# Patient Record
Sex: FEMALE | Race: WHITE | NOT HISPANIC OR LATINO
[De-identification: names, ages, dates, MRNs, and addresses within clinical notes are randomized per-mention and may not be internally consistent; named-entity substitution may affect disease eponyms.]

---

## 2017-02-05 ENCOUNTER — FORM ENCOUNTER (OUTPATIENT)
Age: 57
End: 2017-02-05

## 2017-02-06 ENCOUNTER — OUTPATIENT (OUTPATIENT)
Dept: OUTPATIENT SERVICES | Facility: HOSPITAL | Age: 57
LOS: 1 days | End: 2017-02-06
Payer: COMMERCIAL

## 2017-02-06 ENCOUNTER — APPOINTMENT (OUTPATIENT)
Dept: ULTRASOUND IMAGING | Facility: IMAGING CENTER | Age: 57
End: 2017-02-06

## 2017-02-06 DIAGNOSIS — N95.2 POSTMENOPAUSAL ATROPHIC VAGINITIS: ICD-10-CM

## 2017-02-06 PROCEDURE — 76641 ULTRASOUND BREAST COMPLETE: CPT | Mod: 26,50

## 2017-02-06 PROCEDURE — 76830 TRANSVAGINAL US NON-OB: CPT | Mod: 26

## 2017-02-06 PROCEDURE — 76830 TRANSVAGINAL US NON-OB: CPT

## 2017-02-06 PROCEDURE — 76641 ULTRASOUND BREAST COMPLETE: CPT

## 2017-02-06 PROCEDURE — 76856 US EXAM PELVIC COMPLETE: CPT | Mod: 26

## 2017-02-06 PROCEDURE — 76856 US EXAM PELVIC COMPLETE: CPT

## 2017-02-10 DIAGNOSIS — N83.292 OTHER OVARIAN CYST, LEFT SIDE: ICD-10-CM

## 2017-02-10 DIAGNOSIS — N64.89 OTHER SPECIFIED DISORDERS OF BREAST: ICD-10-CM

## 2017-02-10 DIAGNOSIS — N60.11 DIFFUSE CYSTIC MASTOPATHY OF RIGHT BREAST: ICD-10-CM

## 2017-04-06 ENCOUNTER — RX RENEWAL (OUTPATIENT)
Age: 57
End: 2017-04-06

## 2017-07-05 ENCOUNTER — RX RENEWAL (OUTPATIENT)
Age: 57
End: 2017-07-05

## 2017-07-30 ENCOUNTER — RX RENEWAL (OUTPATIENT)
Age: 57
End: 2017-07-30

## 2017-08-24 ENCOUNTER — APPOINTMENT (OUTPATIENT)
Dept: PULMONOLOGY | Facility: CLINIC | Age: 57
End: 2017-08-24
Payer: COMMERCIAL

## 2017-08-24 VITALS
HEIGHT: 62 IN | DIASTOLIC BLOOD PRESSURE: 65 MMHG | OXYGEN SATURATION: 99 % | BODY MASS INDEX: 23.92 KG/M2 | HEART RATE: 81 BPM | RESPIRATION RATE: 17 BRPM | WEIGHT: 130 LBS | SYSTOLIC BLOOD PRESSURE: 120 MMHG

## 2017-08-24 DIAGNOSIS — R07.89 OTHER CHEST PAIN: ICD-10-CM

## 2017-08-24 PROCEDURE — 99214 OFFICE O/P EST MOD 30 MIN: CPT | Mod: 25

## 2017-08-24 PROCEDURE — 95012 NITRIC OXIDE EXP GAS DETER: CPT

## 2017-08-24 PROCEDURE — 94010 BREATHING CAPACITY TEST: CPT

## 2017-08-24 RX ORDER — CLONAZEPAM 0.5 MG/1
0.5 TABLET ORAL
Qty: 60 | Refills: 0 | Status: ACTIVE | COMMUNITY
Start: 2017-05-15

## 2017-08-24 RX ORDER — METAXALONE 800 MG/1
800 TABLET ORAL
Qty: 60 | Refills: 0 | Status: ACTIVE | COMMUNITY
Start: 2017-03-10

## 2017-10-22 ENCOUNTER — TRANSCRIPTION ENCOUNTER (OUTPATIENT)
Age: 57
End: 2017-10-22

## 2017-12-13 ENCOUNTER — APPOINTMENT (OUTPATIENT)
Dept: PULMONOLOGY | Facility: CLINIC | Age: 57
End: 2017-12-13
Payer: COMMERCIAL

## 2017-12-13 VITALS
HEART RATE: 96 BPM | BODY MASS INDEX: 24.29 KG/M2 | SYSTOLIC BLOOD PRESSURE: 110 MMHG | RESPIRATION RATE: 15 BRPM | DIASTOLIC BLOOD PRESSURE: 70 MMHG | OXYGEN SATURATION: 92 % | HEIGHT: 62 IN | WEIGHT: 132 LBS

## 2017-12-13 PROCEDURE — 71020: CPT

## 2017-12-13 PROCEDURE — 99214 OFFICE O/P EST MOD 30 MIN: CPT | Mod: 25

## 2017-12-13 RX ORDER — BACITRACIN 500 [USP'U]/G
500 OINTMENT OPHTHALMIC
Qty: 3 | Refills: 0 | Status: ACTIVE | COMMUNITY
Start: 2017-11-27

## 2017-12-13 RX ORDER — DULOXETINE HYDROCHLORIDE 30 MG/1
30 CAPSULE, DELAYED RELEASE PELLETS ORAL
Qty: 30 | Refills: 0 | Status: ACTIVE | COMMUNITY
Start: 2017-09-21

## 2017-12-13 RX ORDER — ACETAMINOPHEN AND CODEINE 300; 30 MG/1; MG/1
300-30 TABLET ORAL
Qty: 8 | Refills: 0 | Status: ACTIVE | COMMUNITY
Start: 2017-12-02

## 2017-12-24 ENCOUNTER — RX RENEWAL (OUTPATIENT)
Age: 57
End: 2017-12-24

## 2018-03-14 ENCOUNTER — MEDICATION RENEWAL (OUTPATIENT)
Age: 58
End: 2018-03-14

## 2018-03-23 ENCOUNTER — MEDICATION RENEWAL (OUTPATIENT)
Age: 58
End: 2018-03-23

## 2018-04-03 ENCOUNTER — APPOINTMENT (OUTPATIENT)
Dept: PULMONOLOGY | Facility: CLINIC | Age: 58
End: 2018-04-03

## 2018-05-11 ENCOUNTER — MEDICATION RENEWAL (OUTPATIENT)
Age: 58
End: 2018-05-11

## 2018-07-09 ENCOUNTER — MEDICATION RENEWAL (OUTPATIENT)
Age: 58
End: 2018-07-09

## 2018-07-17 ENCOUNTER — NON-APPOINTMENT (OUTPATIENT)
Age: 58
End: 2018-07-17

## 2018-07-17 ENCOUNTER — APPOINTMENT (OUTPATIENT)
Dept: PULMONOLOGY | Facility: CLINIC | Age: 58
End: 2018-07-17
Payer: COMMERCIAL

## 2018-07-17 VITALS
WEIGHT: 136 LBS | BODY MASS INDEX: 25.03 KG/M2 | HEIGHT: 62 IN | DIASTOLIC BLOOD PRESSURE: 70 MMHG | HEART RATE: 74 BPM | OXYGEN SATURATION: 96 % | SYSTOLIC BLOOD PRESSURE: 120 MMHG

## 2018-07-17 DIAGNOSIS — J20.9 ACUTE BRONCHITIS, UNSPECIFIED: ICD-10-CM

## 2018-07-17 DIAGNOSIS — J45.901 ACUTE BRONCHITIS, UNSPECIFIED: ICD-10-CM

## 2018-07-17 PROCEDURE — 94010 BREATHING CAPACITY TEST: CPT

## 2018-07-17 PROCEDURE — 99214 OFFICE O/P EST MOD 30 MIN: CPT | Mod: 25

## 2018-12-14 ENCOUNTER — RX RENEWAL (OUTPATIENT)
Age: 58
End: 2018-12-14

## 2018-12-19 ENCOUNTER — RX RENEWAL (OUTPATIENT)
Age: 58
End: 2018-12-19

## 2019-01-17 ENCOUNTER — NON-APPOINTMENT (OUTPATIENT)
Age: 59
End: 2019-01-17

## 2019-01-17 ENCOUNTER — APPOINTMENT (OUTPATIENT)
Dept: PULMONOLOGY | Facility: CLINIC | Age: 59
End: 2019-01-17
Payer: COMMERCIAL

## 2019-01-17 VITALS
HEART RATE: 72 BPM | SYSTOLIC BLOOD PRESSURE: 120 MMHG | WEIGHT: 140 LBS | OXYGEN SATURATION: 98 % | DIASTOLIC BLOOD PRESSURE: 60 MMHG | HEIGHT: 62 IN | BODY MASS INDEX: 25.76 KG/M2 | RESPIRATION RATE: 17 BRPM | TEMPERATURE: 98.5 F

## 2019-01-17 PROCEDURE — 94010 BREATHING CAPACITY TEST: CPT

## 2019-01-17 PROCEDURE — 99214 OFFICE O/P EST MOD 30 MIN: CPT | Mod: 25

## 2019-01-17 NOTE — HISTORY OF PRESENT ILLNESS
[FreeTextEntry1] : Ms. Dickerson is a 58 year old female with a history of allergic rhinitis, asthma, atypical chest pain, chest tightness/pressure, GERD, CRYSTAL, and SOB presenting to the office today for a follow up visit. Her chief complaint is recent URI\par -she states that she has recently been getting over a cold, and has not been feeling much better\par -she recently just had her second ablation on her neck \par -she notes that she has been having significant knee pains\par -she reports that possibly due to anxiety, she will have some chest discomfort and palpitations\par -she notes that she occasionally wakes up choking, and is waiting to be finished with Invisalign before getting her mouthpiece\par -she notes that her sinuses were very active last week when she was feeling sick \par -she reports that she was getting frequent sinus headaches\par -she notes that she has been doing yoga for exercise, however she has been having some orthopedic limitations\par -she states that her breathing has improved, and she generally feels better this week \par \par -she denies any headaches, nausea, vomiting, fever, chills, sweats, chest pain, chest pressure, diarrhea, constipation, dysphagia, dizziness, leg swelling, leg pain, itchy eyes, itchy ears, heartburn, reflux, or sour taste in the mouth.

## 2019-01-17 NOTE — ASSESSMENT
[FreeTextEntry1] : Ms. Dickerson is a 57 year old female with asthma, allergies, GERD, OSAS and mild SOB (non-compliant) - She is s/p URI, but is now controlled. \par \par problem 1: asthmatic bronchitis \par -continue to use Levalbuterol 1.25% by the nebulizer \par -continue to use Accolate 20 mg BID \par -continue Trelegy 1 puff QD  \par -continue to use Ventolin PRN \par \par -Asthma is  believed to be caused by inherited (genetic) and environmental factor, but its exact cause is unknown. Asthma may be triggered by allergens, lung infections, or irritants in the air. Asthma triggers are different for each person\par -Inhaler technique reviewed as well as oral hygiene techniques reviewed with patient. Avoidance of cold air, extremes of temperature, rescue inhaler should be used before exercise. Order of medication reviewed with patient. Recommended use of a cool mist humidifier in the bedroom.\par \par problem 3: allergic rhinitis\par -continue to use nasal saline/Xlear; Navage nasal spray\par -can add OTC antihistamine PRN \par -continue to use Astelin (.15) 1 sniff/nostril BID \par \par -Environmental measures for allergies were encouraged including mattress and pillow cover, air purifier, and environmental controls. \par \par problem 4: CRYSTAL\par -recommended trial of the ProVent nasal strips \par -recommended positional sleep \par \par -Sleep apnea is associated with adverse clinical consequences which an affect most organ systems.  Cardiovascular disease risk includes arrhythmias, atrial fibrillation, hypertension, coronary artery disease, and stroke. Metabolic disorders include diabetes type 2, non-alcoholic fatty liver disease. Mood disorder especially depression; and cognitive decline especially in the elderly. Associations with  chronic reflux/Brothers’s esophagus some but not all inclusive. \par -Reasons to assess this include arousal consistent with hypopnea; respiratory events most prominent in REM sleep or supine position; therefore sleep staging and body position are important for accurate diagnosis and estimation of AHI.\par -Treatment options discussed including CPAP/BiPAP machine, oral appliance, ProVent therapy, Oxy-Aid by Respitec, new technologies, or positional sleep.Recommended use of the CPAP machine for moderate (AHI >15), moderate to severe (AHI 15-30) and severe patients (AHI > 30). Recommended weight loss which can reduce AHI especially in weight loss of greater than 5% of BMI. Positional sleep is recommended in those with low AHI, low-moderate BMI, and younger age. For severe sleep apnea, the hypoglossal nerve stimulator was recommended as well.\par \par problem 5: GERD\par -continue AcipHex 20 mg before breakfast\par \par -Rule of 2s: avoid eating too much, eating too late, eating too spicy, eating two hours before bed\par -Things to avoid including overeating, spicy foods, tight clothing, eating within three hours of bed, this list is not all inclusive. \par -For treatment of reflux, possible options discussed including diet control, H2 blockers, PPIs, as well as coating motility agents discussed as treatment options. Timing of meals and proximity of last meal to sleep were discussed. If symptoms persist, a formal gastrointestinal evaluation is needed.\par \par problem 6: atypical chest pain\par -felt to be related to reflux and anxiety \par \par problem 7: health maintenance \par -recommended yearly flu shot after October 15 - 2018\par -recommended strep pneumonia vaccines: Prevnar-13 vaccine, followed by Pneumo vaccine 23 one year following\par -recommended early intervention for URIs\par -recommended regular osteoporosis evaluations\par -recommended early dermatological evaluations\par -recommended after the age of 50 to the age of 70, colonoscopy every 5 years \par \par F/U in 6 months SPI and NIOX.\par She is encouraged to call with any changes, concerns, or questions.

## 2019-01-17 NOTE — PROCEDURE
[FreeTextEntry1] : PFT - spi reveals normal flows; FEV1 is 2.22 which is 94% of predicted, normal flow volume loop

## 2019-01-17 NOTE — ADDENDUM
[FreeTextEntry1] : All medical record entries made by fredi Nino were at Dr. Mathew Rao's, direction and personally dictated by me on 01/17/2019. I have reviewed the chart and agree that the record accurately reflects my personal performance of the history, physical exam, assessment and plan. I have also personally directed, reviewed, and agree with the discharge instructions.

## 2019-01-17 NOTE — REASON FOR VISIT
[Follow-Up] : a follow-up visit [FreeTextEntry1] : allergic rhinitis, asthma, atypical chest pain, chest tightness/pressure, GERD, CRYSTAL, and SOB

## 2019-03-05 ENCOUNTER — RESULT REVIEW (OUTPATIENT)
Age: 59
End: 2019-03-05

## 2019-05-10 ENCOUNTER — APPOINTMENT (OUTPATIENT)
Dept: RADIOLOGY | Facility: IMAGING CENTER | Age: 59
End: 2019-05-10
Payer: COMMERCIAL

## 2019-05-10 ENCOUNTER — APPOINTMENT (OUTPATIENT)
Dept: ULTRASOUND IMAGING | Facility: IMAGING CENTER | Age: 59
End: 2019-05-10
Payer: COMMERCIAL

## 2019-05-10 ENCOUNTER — APPOINTMENT (OUTPATIENT)
Dept: MAMMOGRAPHY | Facility: IMAGING CENTER | Age: 59
End: 2019-05-10
Payer: COMMERCIAL

## 2019-05-10 ENCOUNTER — OUTPATIENT (OUTPATIENT)
Dept: OUTPATIENT SERVICES | Facility: HOSPITAL | Age: 59
LOS: 1 days | End: 2019-05-10
Payer: COMMERCIAL

## 2019-05-10 DIAGNOSIS — Z00.8 ENCOUNTER FOR OTHER GENERAL EXAMINATION: ICD-10-CM

## 2019-05-10 PROCEDURE — 77080 DXA BONE DENSITY AXIAL: CPT | Mod: 26

## 2019-05-10 PROCEDURE — 77080 DXA BONE DENSITY AXIAL: CPT

## 2019-05-10 PROCEDURE — 77063 BREAST TOMOSYNTHESIS BI: CPT | Mod: 26

## 2019-05-10 PROCEDURE — 76641 ULTRASOUND BREAST COMPLETE: CPT | Mod: 26,50

## 2019-05-10 PROCEDURE — 77067 SCR MAMMO BI INCL CAD: CPT | Mod: 26

## 2019-05-10 PROCEDURE — 76641 ULTRASOUND BREAST COMPLETE: CPT

## 2019-05-10 PROCEDURE — 77063 BREAST TOMOSYNTHESIS BI: CPT

## 2019-05-10 PROCEDURE — 77067 SCR MAMMO BI INCL CAD: CPT

## 2019-06-22 ENCOUNTER — RX RENEWAL (OUTPATIENT)
Age: 59
End: 2019-06-22

## 2019-06-22 ENCOUNTER — TRANSCRIPTION ENCOUNTER (OUTPATIENT)
Age: 59
End: 2019-06-22

## 2019-07-11 ENCOUNTER — RX RENEWAL (OUTPATIENT)
Age: 59
End: 2019-07-11

## 2019-07-17 ENCOUNTER — NON-APPOINTMENT (OUTPATIENT)
Age: 59
End: 2019-07-17

## 2019-07-17 ENCOUNTER — APPOINTMENT (OUTPATIENT)
Dept: PULMONOLOGY | Facility: CLINIC | Age: 59
End: 2019-07-17
Payer: COMMERCIAL

## 2019-07-17 VITALS
OXYGEN SATURATION: 98 % | WEIGHT: 136 LBS | HEART RATE: 90 BPM | DIASTOLIC BLOOD PRESSURE: 65 MMHG | BODY MASS INDEX: 25.03 KG/M2 | HEIGHT: 62 IN | SYSTOLIC BLOOD PRESSURE: 120 MMHG | RESPIRATION RATE: 16 BRPM

## 2019-07-17 PROCEDURE — 94010 BREATHING CAPACITY TEST: CPT

## 2019-07-17 PROCEDURE — 99214 OFFICE O/P EST MOD 30 MIN: CPT | Mod: 25

## 2019-07-17 RX ORDER — AMOXICILLIN AND CLAVULANATE POTASSIUM 875; 125 MG/1; MG/1
875-125 TABLET, COATED ORAL
Qty: 28 | Refills: 0 | Status: DISCONTINUED | COMMUNITY
Start: 2017-12-11 | End: 2019-07-17

## 2019-07-17 NOTE — ASSESSMENT
[FreeTextEntry1] : Ms. Dickersno is a 57 year old female with asthma, allergies, GERD, OSAS and mild SOB (non-compliant) presenting to the office today for a follow up pulmonary evaluation for stress, orthopedic issues, sinus issues, and GI symptoms. Her number one issue is stress.\par \par problem 1: asthma\par -continue to use Levalbuterol 1.25% by the nebulizer \par -continue to use Accolate 20 mg BID \par -continue Trelegy 1 puff QD  \par -continue to use Ventolin PRN \par \par -Asthma is  believed to be caused by inherited (genetic) and environmental factor, but its exact cause is unknown. Asthma may be triggered by allergens, lung infections, or irritants in the air. Asthma triggers are different for each person\par -Inhaler technique reviewed as well as oral hygiene techniques reviewed with patient. Avoidance of cold air, extremes of temperature, rescue inhaler should be used before exercise. Order of medication reviewed with patient. Recommended use of a cool mist humidifier in the bedroom.\par \par problem 3: allergic rhinitis - (active)\par -continue to use nasal saline/Xlear; Navage nasal spray\par -can add OTC antihistamine PRN \par -continue to use Astelin (.15) 1 sniff/nostril BID \par \par -Environmental measures for allergies were encouraged including mattress and pillow cover, air purifier, and environmental controls. \par \par problem 4: CRYSTAL\par -recommended trial of the ProVent nasal strips \par -recommended positional sleep \par -recommended to use "Chin Strap" \par -Sleep apnea is associated with adverse clinical consequences which an affect most organ systems.  Cardiovascular disease risk includes arrhythmias, atrial fibrillation, hypertension, coronary artery disease, and stroke. Metabolic disorders include diabetes type 2, non-alcoholic fatty liver disease. Mood disorder especially depression; and cognitive decline especially in the elderly. Associations with  chronic reflux/Brothers’s esophagus some but not all inclusive. \par -Reasons to assess this include arousal consistent with hypopnea; respiratory events most prominent in REM sleep or supine position; therefore sleep staging and body position are important for accurate diagnosis and estimation of AHI.\par -Treatment options discussed including CPAP/BiPAP machine, oral appliance, ProVent therapy, Oxy-Aid by Respitec, new technologies, or positional sleep.Recommended use of the CPAP machine for moderate (AHI >15), moderate to severe (AHI 15-30) and severe patients (AHI > 30). Recommended weight loss which can reduce AHI especially in weight loss of greater than 5% of BMI. Positional sleep is recommended in those with low AHI, low-moderate BMI, and younger age. For severe sleep apnea, the hypoglossal nerve stimulator was recommended as well.\par \par problem 5: GERD\par -continue AcipHex 20 mg before breakfast\par \par -Rule of 2s: avoid eating too much, eating too late, eating too spicy, eating two hours before bed\par -Things to avoid including overeating, spicy foods, tight clothing, eating within three hours of bed, this list is not all inclusive. \par -For treatment of reflux, possible options discussed including diet control, H2 blockers, PPIs, as well as coating motility agents discussed as treatment options. Timing of meals and proximity of last meal to sleep were discussed. If symptoms persist, a formal gastrointestinal evaluation is needed.\par \par problem 6: atypical chest pain\par -felt to be related to reflux and anxiety \par \par problem 7: anxiety \par -recommended to read: "The Gift of Maybe," by Rebeca Aguilar \par \par problem 8: health maintenance \par -recommended yearly flu shot after October 15 - 2018\par -recommended strep pneumonia vaccines: Prevnar-13 vaccine, followed by Pneumo vaccine 23 one year following\par -recommended early intervention for URIs\par -recommended regular osteoporosis evaluations\par -recommended early dermatological evaluations\par -recommended after the age of 50 to the age of 70, colonoscopy every 5 years \par \par F/U in 6 months SPI and NIOX.\par She is encouraged to call with any changes, concerns, or questions.

## 2019-07-17 NOTE — ADDENDUM
[FreeTextEntry1] : All medical record entries made by fredi Nino were at Dr. Mathew Rao's, direction and personally dictated by me on 07/17/2019. I have reviewed the chart and agree that the record accurately reflects my personal performance of the history, physical exam, assessment and plan. I have also personally directed, reviewed, and agree with the discharge instructions.

## 2019-07-17 NOTE — PROCEDURE
[FreeTextEntry1] : PFT - spi reveals normal flows; FEV1 is 2.25 which is 95% of predicted, normal flow volume loop \par \par *Refused FeNO test due to insurance coverage*

## 2019-07-17 NOTE — REASON FOR VISIT
[Follow-Up] : a follow-up visit [FreeTextEntry1] : allergic rhinitis, asthma, atypical chest pain, GERD, CRYSTAL, and SOB

## 2019-07-17 NOTE — HISTORY OF PRESENT ILLNESS
[FreeTextEntry1] : Ms. Dickerson is a 58 year old female with a history of allergic rhinitis, asthma, atypical chest pain, GERD, CRYSTAL, and SOB presenting to the office today for a follow up visit. His chief complaint is stress / orthopedic issues. 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 she feels significant chest tightness when she begins to feel panicked \par -she has been using NeoMed spray; she has not gotten a Navage\par -she denies any headaches, nausea, vomiting, fever, chills, sweats, chest pain, diarrhea, constipation, dysphagia, dizziness, leg swelling, leg pain, itchy eyes, itchy ears, heartburn, reflux, or sour taste in the mouth.

## 2019-09-24 ENCOUNTER — MEDICATION RENEWAL (OUTPATIENT)
Age: 59
End: 2019-09-24

## 2019-10-03 ENCOUNTER — RX RENEWAL (OUTPATIENT)
Age: 59
End: 2019-10-03

## 2020-01-05 ENCOUNTER — RX RENEWAL (OUTPATIENT)
Age: 60
End: 2020-01-05

## 2020-01-21 ENCOUNTER — NON-APPOINTMENT (OUTPATIENT)
Age: 60
End: 2020-01-21

## 2020-01-21 ENCOUNTER — APPOINTMENT (OUTPATIENT)
Dept: PULMONOLOGY | Facility: CLINIC | Age: 60
End: 2020-01-21
Payer: COMMERCIAL

## 2020-01-21 VITALS
RESPIRATION RATE: 17 BRPM | HEART RATE: 94 BPM | SYSTOLIC BLOOD PRESSURE: 120 MMHG | WEIGHT: 140 LBS | DIASTOLIC BLOOD PRESSURE: 70 MMHG | OXYGEN SATURATION: 98 % | BODY MASS INDEX: 25.76 KG/M2 | TEMPERATURE: 99.4 F | HEIGHT: 62 IN

## 2020-01-21 DIAGNOSIS — M79.7 FIBROMYALGIA: ICD-10-CM

## 2020-01-21 PROCEDURE — 94010 BREATHING CAPACITY TEST: CPT

## 2020-01-21 PROCEDURE — 99214 OFFICE O/P EST MOD 30 MIN: CPT | Mod: 25

## 2020-01-21 RX ORDER — AMOXICILLIN AND CLAVULANATE POTASSIUM 875; 125 MG/1; MG/1
875-125 TABLET, COATED ORAL
Qty: 28 | Refills: 0 | Status: DISCONTINUED | COMMUNITY
Start: 2019-09-24 | End: 2020-01-21

## 2020-01-21 RX ORDER — CEFUROXIME AXETIL 500 MG/1
500 TABLET ORAL
Qty: 28 | Refills: 0 | Status: DISCONTINUED | COMMUNITY
Start: 2020-01-09 | End: 2020-01-21

## 2020-01-21 RX ORDER — PREDNISONE 10 MG/1
10 TABLET ORAL
Qty: 30 | Refills: 0 | Status: DISCONTINUED | COMMUNITY
Start: 2017-12-13 | End: 2020-01-21

## 2020-01-21 NOTE — ASSESSMENT
[FreeTextEntry1] : Ms. Dickerson is a 59 year old female with asthma, allergies, GERD, OSAS and mild SOB (non-compliant) presenting to the office today for a follow up pulmonary evaluation for stress, orthopedic issues, sinus issues, and GI symptoms. Her number one issue is \par \par problem 1: asthma\par -continue to use Levalbuterol 1.25% by the nebulizer \par -continue to use Accolate 20 mg BID \par -continue Trelegy 1 puff QD  \par -continue to use Ventolin PRN \par \par -Asthma is  believed to be caused by inherited (genetic) and environmental factor, but its exact cause is unknown. Asthma may be triggered by allergens, lung infections, or irritants in the air. Asthma triggers are different for each person\par -Inhaler technique reviewed as well as oral hygiene techniques reviewed with patient. Avoidance of cold air, extremes of temperature, rescue inhaler should be used before exercise. Order of medication reviewed with patient. Recommended use of a cool mist humidifier in the bedroom.\par \par problem 2: allergic rhinitis - (active)\par -continue to use nasal saline/Xlear; Navage nasal spray\par -can add OTC antihistamine PRN \par -continue to use Astelin (.15) 1 sniff/nostril BID \par \par -Environmental measures for allergies were encouraged including mattress and pillow cover, air purifier, and environmental controls. \par \par problem 3: acute/chronic sinusitis\par -recommended CT of sinuses w/o contrast\par -add Bactrim DS 1 tablet BID\par \par problem 4: CRYSTAL\par -recommended trial of the ProVent nasal strips \par -recommended positional sleep \par -recommended to use "Chin Strap" \par -Sleep apnea is associated with adverse clinical consequences which an affect most organ systems.  Cardiovascular disease risk includes arrhythmias, atrial fibrillation, hypertension, coronary artery disease, and stroke. Metabolic disorders include diabetes type 2, non-alcoholic fatty liver disease. Mood disorder especially depression; and cognitive decline especially in the elderly. Associations with  chronic reflux/Brothers’s esophagus some but not all inclusive. \par -Reasons to assess this include arousal consistent with hypopnea; respiratory events most prominent in REM sleep or supine position; therefore sleep staging and body position are important for accurate diagnosis and estimation of AHI.\par -Treatment options discussed including CPAP/BiPAP machine, oral appliance, ProVent therapy, Oxy-Aid by Respitec, new technologies, or positional sleep.Recommended use of the CPAP machine for moderate (AHI >15), moderate to severe (AHI 15-30) and severe patients (AHI > 30). Recommended weight loss which can reduce AHI especially in weight loss of greater than 5% of BMI. Positional sleep is recommended in those with low AHI, low-moderate BMI, and younger age. For severe sleep apnea, the hypoglossal nerve stimulator was recommended as well.\par \par problem 5: GERD\par -continue AcipHex 20 mg before breakfast\par \par -Rule of 2s: avoid eating too much, eating too late, eating too spicy, eating two hours before bed\par -Things to avoid including overeating, spicy foods, tight clothing, eating within three hours of bed, this list is not all inclusive. \par -For treatment of reflux, possible options discussed including diet control, H2 blockers, PPIs, as well as coating motility agents discussed as treatment options. Timing of meals and proximity of last meal to sleep were discussed. If symptoms persist, a formal gastrointestinal evaluation is needed.\par \par problem 6: atypical chest pain\par -felt to be related to reflux and anxiety \par \par problem 7: anxiety \par -recommended to read: "The Gift of Maybe," by Rebeca Aguilar \par \par problem 8: overweight\par -recommended to try HMR at [place]\par Weight loss, exercise, and diet control were discussed and are highly encouraged. Treatment options were given such as, aqua therapy, and contacting a nutritionist. Recommended to use the elliptical, stationary bike, less use of treadmill. Mindful eating was explained to the patient Obesity is associated with worsening asthma, shortness of breath, and potential for cardiac disease, diabetes, and other underlying medical conditions. \par \par problem 9: health maintenance \par -recommended yearly flu shot after October 15 - 2018\par -recommended strep pneumonia vaccines: Prevnar-13 vaccine, followed by Pneumo vaccine 23 one year following\par -recommended early intervention for URIs\par -recommended regular osteoporosis evaluations\par -recommended early dermatological evaluations\par -recommended after the age of 50 to the age of 70, colonoscopy every 5 years \par \par F/U in 6 months SPI and NIOX.\par She is encouraged to call with any changes, concerns, or questions.

## 2020-01-21 NOTE — PHYSICAL EXAM
[General Appearance - Well Developed] : well developed [General Appearance - Well Nourished] : well nourished [Well Groomed] : well groomed [Normal Appearance] : normal appearance [Normal Conjunctiva] : the conjunctiva exhibited no abnormalities [Eyelids - No Xanthelasma] : the eyelids demonstrated no xanthelasmas [General Appearance - In No Acute Distress] : no acute distress [No Deformities] : no deformities [II] : II [Normal Oropharynx] : normal oropharynx [Neck Appearance] : the appearance of the neck was normal [Thyroid Nodule] : there were no palpable thyroid nodules [Jugular Venous Distention Increased] : there was no jugular-venous distention [Neck Cervical Mass (___cm)] : no neck mass was observed [Thyroid Diffuse Enlargement] : the thyroid was not enlarged [Heart Sounds] : normal S1 and S2 [Heart Rate And Rhythm] : heart rate and rhythm were normal [Murmurs] : no murmurs present [Respiration, Rhythm And Depth] : normal respiratory rhythm and effort [Exaggerated Use Of Accessory Muscles For Inspiration] : no accessory muscle use [Abdomen Soft] : soft [Auscultation Breath Sounds / Voice Sounds] : lungs were clear to auscultation bilaterally [Abnormal Walk] : normal gait [Abdomen Tenderness] : non-tender [Abdomen Mass (___ Cm)] : no abdominal mass palpated [Gait - Sufficient For Exercise Testing] : the gait was sufficient for exercise testing [Cyanosis, Localized] : no localized cyanosis [Nail Clubbing] : no clubbing of the fingernails [Petechial Hemorrhages (___cm)] : no petechial hemorrhages [Skin Color & Pigmentation] : normal skin color and pigmentation [No Skin Ulcers] : no skin ulcer [] : no rash [No Venous Stasis] : no venous stasis [Skin Lesions] : no skin lesions [No Xanthoma] : no  xanthoma was observed [Sensation] : the sensory exam was normal to light touch and pinprick [Deep Tendon Reflexes (DTR)] : deep tendon reflexes were 2+ and symmetric [Oriented To Time, Place, And Person] : oriented to person, place, and time [No Focal Deficits] : no focal deficits [Impaired Insight] : insight and judgment were intact [Affect] : the affect was normal [FreeTextEntry1] : I:E ratio 1:3; clear

## 2020-01-21 NOTE — HISTORY OF PRESENT ILLNESS
[FreeTextEntry1] : Ms. Dickerson is a 59 year old female with a history of allergic rhinitis, asthma, atypical chest pain, GERD, CRYSTAL, and SOB presenting to the office today for a follow up visit. His chief complaint is her weight. \par - She reports having an ongoing sinus infection, for which she tried Ceftin with no relief. She has been producing blood tinged mucus. \par - She reports having recurrent nausea.\par - She states she wakes up frequently during the night secondary to muscle cramping in her feet/legs.\par - She reports she has had swelling in her legs, as well as the joints in her hands.\par - She reports her diet could be improved, though she stress eats at nighttime.\par - She has been dealing with osteoarthritis in her back an neck for which she has undergone epidurals with no help. \par - She reports having itchy ears/eyes due to her sinus symptoms.\par - She recently stopped Gabapentin due to weight gain and has started Cymbalta, now doing okay.\par - She reports she is craving sugar more frequently. \par - She reports her weight is high, partly due to her inability to be active due to joint pain. \par - She denies any headaches, vomiting, fever, chills, sweats, chest pain, chest pressure, palpitations, diarrhea, constipation, dysphagia, dizziness, heartburn, reflux, sour taste in the mouth, cough, SOB, wheeze.

## 2020-01-21 NOTE — ADDENDUM
[FreeTextEntry1] : Documented by JAC MCLEAN acting as a scribe for Dr. Mathew Rao on 01/21/2020.\par \par All medical record entries made by the Scribe were at my, Dr. Mathew Rao's, direction and personally dictated by me on 01/21/2020. I have reviewed the chart and agree that the record accurately reflects my personal performance of the history, physical exam, assessment and plan. I have also personally directed, reviewed, and agree with the discharge instructions.

## 2020-01-21 NOTE — PROCEDURE
[FreeTextEntry1] : PFT- spi reveals normal flows; FEV1 was 2.19 L which is 92% of predicted;  normal flow volume loop

## 2020-05-19 ENCOUNTER — APPOINTMENT (OUTPATIENT)
Dept: PULMONOLOGY | Facility: CLINIC | Age: 60
End: 2020-05-19

## 2020-05-20 ENCOUNTER — RESULT REVIEW (OUTPATIENT)
Age: 60
End: 2020-05-20

## 2020-06-02 ENCOUNTER — APPOINTMENT (OUTPATIENT)
Dept: MAMMOGRAPHY | Facility: IMAGING CENTER | Age: 60
End: 2020-06-02
Payer: COMMERCIAL

## 2020-06-02 ENCOUNTER — APPOINTMENT (OUTPATIENT)
Dept: ULTRASOUND IMAGING | Facility: IMAGING CENTER | Age: 60
End: 2020-06-02
Payer: COMMERCIAL

## 2020-06-02 ENCOUNTER — OUTPATIENT (OUTPATIENT)
Dept: OUTPATIENT SERVICES | Facility: HOSPITAL | Age: 60
LOS: 1 days | End: 2020-06-02
Payer: COMMERCIAL

## 2020-06-02 DIAGNOSIS — Z00.8 ENCOUNTER FOR OTHER GENERAL EXAMINATION: ICD-10-CM

## 2020-06-02 PROCEDURE — 77067 SCR MAMMO BI INCL CAD: CPT

## 2020-06-02 PROCEDURE — 77067 SCR MAMMO BI INCL CAD: CPT | Mod: 26

## 2020-06-02 PROCEDURE — 76641 ULTRASOUND BREAST COMPLETE: CPT | Mod: 26,50

## 2020-06-02 PROCEDURE — 77063 BREAST TOMOSYNTHESIS BI: CPT | Mod: 26

## 2020-06-02 PROCEDURE — 77063 BREAST TOMOSYNTHESIS BI: CPT

## 2020-06-02 PROCEDURE — 76641 ULTRASOUND BREAST COMPLETE: CPT

## 2020-06-10 ENCOUNTER — APPOINTMENT (OUTPATIENT)
Dept: PULMONOLOGY | Facility: CLINIC | Age: 60
End: 2020-06-10
Payer: COMMERCIAL

## 2020-06-10 DIAGNOSIS — Z87.09 PERSONAL HISTORY OF OTHER DISEASES OF THE RESPIRATORY SYSTEM: ICD-10-CM

## 2020-06-10 PROCEDURE — 99214 OFFICE O/P EST MOD 30 MIN: CPT | Mod: 95

## 2020-06-10 NOTE — ADDENDUM
[FreeTextEntry1] : Documented by Dank Whaley acting as a scribe for Dr. Mathew Rao on 06/10/2020 \par \par All medical record entries made by the Scribe were at my, Dr. Mathew Rao's, direction and personally dictated by me on 06/10/2020 . I have reviewed the chart and agree that the record accurately reflects my personal performance of the history, physical exam, assessment and plan. I have also personally directed, reviewed, and agree with the discharge instructions.

## 2020-06-10 NOTE — REVIEW OF SYSTEMS
[Recent Wt Gain (___ Lbs)] : ~T recent [unfilled] lb weight gain [Recent Wt Loss (___ Lbs)] : ~T recent [unfilled] lb weight loss [Negative] : Endocrine

## 2020-06-10 NOTE — REASON FOR VISIT
[Follow-Up] : a follow-up visit [FreeTextEntry1] : Video Telehealth - allergic rhinitis, asthma, atypical chest pain, GERD, CRYSTAL, and SOB

## 2020-06-10 NOTE — PHYSICAL EXAM
[No Acute Distress] : no acute distress [No Deformities] : no deformities [TextBox_2] : Appears Well.

## 2020-06-10 NOTE — ASSESSMENT
[FreeTextEntry1] : Ms. Dickerson is a 59 year old female with asthma, allergies, GERD, OSAS and mild SOB (non-compliant) presenting to the office today for a follow up pulmonary evaluation for stress, orthopedic issues, sinus issues, and GI symptoms. Her number one issue is orthopedic issues. \par \par problem 1: asthma\par -continue to use Levalbuterol 1.25% by the nebulizer \par -continue to use Accolate 20 mg BID \par -continue Trelegy 1 puff QD  \par -continue to use Ventolin PRN \par \par -Asthma is  believed to be caused by inherited (genetic) and environmental factor, but its exact cause is unknown. Asthma may be triggered by allergens, lung infections, or irritants in the air. Asthma triggers are different for each person\par -Inhaler technique reviewed as well as oral hygiene techniques reviewed with patient. Avoidance of cold air, extremes of temperature, rescue inhaler should be used before exercise. Order of medication reviewed with patient. Recommended use of a cool mist humidifier in the bedroom.\par \par problem 2: allergic rhinitis\par -continue to use nasal saline/Xlear; Navage nasal spray\par -can continue OTC antihistamine PRN \par -continue to use Astelin (.15) 1 sniff/nostril BID \par \par -Environmental measures for allergies were encouraged including mattress and pillow cover, air purifier, and environmental controls. \par \par problem 3: acute/chronic sinusitis -Nasal Polyps \par -recommended CT of sinuses w/o contrast\par -Consider Dupixent. \par \par problem 4: CRYSTAL\par -recommended trial of the ProVent nasal strips \par -recommended positional sleep \par -recommended to use "Chin Strap" \par -Sleep apnea is associated with adverse clinical consequences which an affect most organ systems.  Cardiovascular disease risk includes arrhythmias, atrial fibrillation, hypertension, coronary artery disease, and stroke. Metabolic disorders include diabetes type 2, non-alcoholic fatty liver disease. Mood disorder especially depression; and cognitive decline especially in the elderly. Associations with  chronic reflux/Brothers’s esophagus some but not all inclusive. \par -Reasons to assess this include arousal consistent with hypopnea; respiratory events most prominent in REM sleep or supine position; therefore sleep staging and body position are important for accurate diagnosis and estimation of AHI.\par -Treatment options discussed including CPAP/BiPAP machine, oral appliance, ProVent therapy, Oxy-Aid by Respitec, new technologies, or positional sleep.Recommended use of the CPAP machine for moderate (AHI >15), moderate to severe (AHI 15-30) and severe patients (AHI > 30). Recommended weight loss which can reduce AHI especially in weight loss of greater than 5% of BMI. Positional sleep is recommended in those with low AHI, low-moderate BMI, and younger age. For severe sleep apnea, the hypoglossal nerve stimulator was recommended as well.\par \par problem 5: GERD (Francisco) \par -continue AcipHex 20 mg before breakfast\par \par -Rule of 2s: avoid eating too much, eating too late, eating too spicy, eating two hours before bed\par -Things to avoid including overeating, spicy foods, tight clothing, eating within three hours of bed, this list is not all inclusive. \par -For treatment of reflux, possible options discussed including diet control, H2 blockers, PPIs, as well as coating motility agents discussed as treatment options. Timing of meals and proximity of last meal to sleep were discussed. If symptoms persist, a formal gastrointestinal evaluation is needed.\par \par problem 6: atypical chest pain (resolved) \par -felt to be related to reflux and anxiety \par \par problem 7: anxiety \par -recommended to read: "The Gift of Maybe," by Rebeca Aguilar \par \par problem 8: overweight\par -recommended to try WW. \par Weight loss, exercise, and diet control were discussed and are highly encouraged. Treatment options were given such as, aqua therapy, and contacting a nutritionist. Recommended to use the elliptical, stationary bike, less use of treadmill. Mindful eating was explained to the patient Obesity is associated with worsening asthma, shortness of breath, and potential for cardiac disease, diabetes, and other underlying medical conditions. \par \par Problem 9: Health Maintenance/COVID19 Precautions:\par - Clean your hands often. Wash your hands often with soap and water for at least 20 seconds, especially after blowing your nose, coughing, or sneezing, or having been in a public place.\par - If soap and water are not available, use a hand  that contains at least 60% alcohol.\par - To the extent possible, avoid touching high-touch surfaces in public places - elevator buttons, door handles, handrails, handshaking with people, etc. Use a tissue or your sleeve to cover your hand or finger if you must touch something.\par - Wash your hands after touching surfaces in public places.\par - Avoid touching your face, nose, eyes, etc.\par - Clean and disinfect your home to remove germs: practice routine cleaning of frequently touched surfaces (for example: tables, doorknobs, light switches, handles, desks, toilets, faucets, sinks & cell phones)\par - Avoid crowds, especially in poorly ventilated spaces. Your risk of exposure to respiratory viruses like COVID-19 may increase in crowded, closed-in settings with little air circulation if\par there are people in the crowd who are sick. All patients are recommended to practice social distancing and stay at least 6 feet away from others.\par - Avoid all non-essential travel including plane trips, and especially avoid embarking on cruise ships.\par -If COVID-19 is spreading in your community, take extra measures to put distance between yourself and other people to further reduce your risk of being exposed to this new virus.\par -Stay home as much as possible.\par - Consider ways of getting food brought to your house through family, social, or commercial networks\par -Be aware that the virus has been known to live in the air up to 3 hours post exposure, cardboard up to 24 hours post exposure, copper up to 4 hours post exposure, steel and plastic up to 2-3 days post exposure. Risk of transmission from these surfaces are affected by many variables.\par \par Immune Support Recommendations:\par -OTC Vitamin C 500mg BID \par -OTC Quercetin 250-500mg BID \par -OTC Zinc 75-100mg per day \par -OTC Melatonin 1or 2mg a night \par -OTC Vitamin D 1-4000mg per day\par -Tonic Water 8oz\par -Recommended to Stay Hydrated (At least a gallon/day)\par \par Asthma and COVID19:\par You need to make sure your asthma is under control. This often requires the use of inhaled corticosteroids (and sometimes oral corticosteroids). Inhaled corticosteroids do not likely reduce your immune system’s ability to fight infections, but oral corticosteroids may. It is important to use the steps above to protect yourself to limit your exposure to any respiratory virus. \par \par problem 10: health maintenance \par -recommended yearly flu shot after October 15 - 2018\par -recommended strep pneumonia vaccines: Prevnar-13 vaccine, followed by Pneumo vaccine 23 one year following\par -recommended early intervention for URIs\par -recommended regular osteoporosis evaluations\par -recommended early dermatological evaluations\par -recommended after the age of 50 to the age of 70, colonoscopy every 5 years \par \par F/U in 6 months SPI and NIOX.\par She is encouraged to call with any changes, concerns, or questions.

## 2020-06-10 NOTE — HISTORY OF PRESENT ILLNESS
[Home] : at home, [unfilled] , at the time of the visit. [Medical Office: ([facility])___] : at the medical office located in  [Verbal consent obtained from patient] : the patient, [unfilled] [FreeTextEntry1] : Ms. Dickerson is a 59 year old female with a history of allergic rhinitis, asthma, atypical chest pain, GERD, CRYSTAL, and SOB video calls to the office today for a follow up visit. His chief complaint is health maintenance. \par -has been feeling well. \par -has been trying to exercise by doing Zoom Yoga and walking. \par -heartburn and reflux is controlled with use of medication. \par -sinuses have been alright. \par -allergies have been slightly active. \par -denies being sick recently. \par -sometimes does not feel great and just tries to take things a little slowly.\par -she notes that she has gained some weight recently. gained 15 pounds but also lost 7 pounds. \par -has been trying to eat better, stay hydrated. \par -sometimes breathing becomes labored. \par \par -today she denies any chest pain, chest pressure, diarrhea, constipation, dysphagia, dizziness, leg swelling, leg pain, itchy eyes, itchy ears.

## 2020-08-17 DIAGNOSIS — Z01.812 ENCOUNTER FOR PREPROCEDURAL LABORATORY EXAMINATION: ICD-10-CM

## 2020-08-21 ENCOUNTER — RX RENEWAL (OUTPATIENT)
Age: 60
End: 2020-08-21

## 2020-09-01 LAB — SARS-COV-2 N GENE NPH QL NAA+PROBE: NOT DETECTED

## 2020-09-03 ENCOUNTER — APPOINTMENT (OUTPATIENT)
Dept: PULMONOLOGY | Facility: CLINIC | Age: 60
End: 2020-09-03
Payer: COMMERCIAL

## 2020-09-03 VITALS
HEART RATE: 82 BPM | HEIGHT: 62 IN | RESPIRATION RATE: 18 BRPM | SYSTOLIC BLOOD PRESSURE: 130 MMHG | TEMPERATURE: 97.7 F | BODY MASS INDEX: 25.76 KG/M2 | OXYGEN SATURATION: 98 % | DIASTOLIC BLOOD PRESSURE: 85 MMHG | WEIGHT: 140 LBS

## 2020-09-03 DIAGNOSIS — Z23 ENCOUNTER FOR IMMUNIZATION: ICD-10-CM

## 2020-09-03 PROCEDURE — 99214 OFFICE O/P EST MOD 30 MIN: CPT | Mod: 25

## 2020-09-03 PROCEDURE — 94618 PULMONARY STRESS TESTING: CPT

## 2020-09-03 PROCEDURE — 94729 DIFFUSING CAPACITY: CPT

## 2020-09-03 PROCEDURE — G0008: CPT

## 2020-09-03 PROCEDURE — 90682 RIV4 VACC RECOMBINANT DNA IM: CPT

## 2020-09-03 PROCEDURE — 94010 BREATHING CAPACITY TEST: CPT

## 2020-09-03 PROCEDURE — 94727 GAS DIL/WSHOT DETER LNG VOL: CPT

## 2020-09-03 PROCEDURE — 95012 NITRIC OXIDE EXP GAS DETER: CPT

## 2020-09-03 RX ORDER — CLARITHROMYCIN 500 MG/1
500 TABLET, FILM COATED ORAL
Qty: 20 | Refills: 0 | Status: DISCONTINUED | COMMUNITY
Start: 2017-12-13 | End: 2020-09-03

## 2020-09-03 NOTE — PHYSICAL EXAM
[Normal Oropharynx] : normal oropharynx [No Acute Distress] : no acute distress [No Neck Mass] : no neck mass [Normal Appearance] : normal appearance [Normal Rate/Rhythm] : normal rate/rhythm [Normal S1, S2] : normal s1, s2 [No Murmurs] : no murmurs [No Resp Distress] : no resp distress [No Abnormalities] : no abnormalities [Clear to Auscultation Bilaterally] : clear to auscultation bilaterally [Benign] : benign [No Cyanosis] : no cyanosis [No Clubbing] : no clubbing [Normal Gait] : normal gait [No Edema] : no edema [FROM] : FROM [Normal Color/ Pigmentation] : normal color/ pigmentation [No Focal Deficits] : no focal deficits [Oriented x3] : oriented x3 [Normal Affect] : normal affect [II] : Mallampati Class: II [TextBox_68] : I:E ratio 1:3; clear

## 2020-09-03 NOTE — ADDENDUM
[FreeTextEntry1] : Documented by Michael Deras acting as a scribe for Dr. Mathew Rao on 09/03/2020.\par \par All medical record entries made by the Scribe were at my, Dr. Mathew Rao's, direction and personally dictated by me on 09/03/2020 . I have reviewed the chart and agree that the record accurately reflects my personal performance of the history, physical exam, assessment and plan. I have also personally directed, reviewed, and agree with the discharge instructions. \par

## 2020-09-03 NOTE — PROCEDURE
[FreeTextEntry1] : CT (JAN.25.2020) reveals right maxillary sinus postsurgical changes with moderate thickening and small fluid level, the latter possibly on the basis of acute sinusitis. Remaining paranasal sinuses are well aerated with patent drainage pathways. Mild leftward bowing of the nasal septum. Parapical lucency at tooth 15. Recommend correlation with dental exam. \par \par Full PFT revealed moderate obstructive flows at mid to low volumes, with a FEV1 of 2.04 L, which is 91% of predicted, normal lung volumes, and a diffusion of 19.0, which is 102% of predicted, with a normal flow volume loop \par \par FENO was 9; a normal value being less than 25\par Fractional exhaled nitric oxide (FENO) is regarded as a simple, noninvasive method for assessing eosinophilic airway inflammation. Produced by a variety of cells within the lung, nitric oxide (NO) concentrations are generally low in healthy individuals. However, high concentrations of NO appear to be involved in nonspecific host defense mechanisms and chronic inflammatory diseases such as asthma. The American Thoracic Society (ATS) therefore has recommended using FENO to aid in the diagnosis and monitoring of eosinophilic airway inflammation and asthma, and for identifying steroid responsive individuals whose chronic respiratory symptoms may be caused by airway inflammation. \par  \par 6 minute walk test reveals a low saturation of 90% with very slight evidence of dyspnea or fatigue; walked 586.8   meters\par

## 2020-09-03 NOTE — ASSESSMENT
[FreeTextEntry1] : Ms. Dickerson is a 59 year old female with vertigo, fibromyalgia, asthma, allergies, GERD, OSAS and mild SOB (non-compliant) presenting to the office today for a follow up pulmonary evaluation for pre-op lumbar disc surgery. Her number one issue is orthopedic issues. \par \par **********************PRE OP CLEARANCE FOR L4 AND L5 BACK SURGERY 9/3/2020********************************\par problem 1: asthma (controlled)\par -continue to use Levalbuterol 1.25% by the nebulizer \par -continue to use Accolate 20 mg BID \par -continue Trelegy 1 puff QD  \par -add ProAir 2 puffs Q6H, pre-exercise\par -continue to use Ventolin PRN \par \par -Asthma is  believed to be caused by inherited (genetic) and environmental factor, but its exact cause is unknown. Asthma may be triggered by allergens, lung infections, or irritants in the air. Asthma triggers are different for each person\par -Inhaler technique reviewed as well as oral hygiene techniques reviewed with patient. Avoidance of cold air, extremes of temperature, rescue inhaler should be used before exercise. Order of medication reviewed with patient. Recommended use of a cool mist humidifier in the bedroom.\par \par problem 2: allergic rhinitis\par -continue to use nasal saline/Xlear; Navage nasal spray\par -can continue OTC antihistamine PRN \par -continue to use Astelin (.15) 1 sniff/nostril BID \par \par -Environmental measures for allergies were encouraged including mattress and pillow cover, air purifier, and environmental controls. \par \par problem 3: acute/chronic sinusitis -Nasal Polyps \par -recommended CT of sinuses w/o contrast\par -Consider Dupixent. \par \par problem 4: CRYSTAL\par -recommended trial of the ProVent nasal strips \par -recommended positional sleep \par -recommended to use "Chin Strap" \par -Sleep apnea is associated with adverse clinical consequences which an affect most organ systems.  Cardiovascular disease risk includes arrhythmias, atrial fibrillation, hypertension, coronary artery disease, and stroke. Metabolic disorders include diabetes type 2, non-alcoholic fatty liver disease. Mood disorder especially depression; and cognitive decline especially in the elderly. Associations with  chronic reflux/Brothers’s esophagus some but not all inclusive. \par -Reasons to assess this include arousal consistent with hypopnea; respiratory events most prominent in REM sleep or supine position; therefore sleep staging and body position are important for accurate diagnosis and estimation of AHI.\par -Treatment options discussed including CPAP/BiPAP machine, oral appliance, ProVent therapy, Oxy-Aid by Respitec, new technologies, or positional sleep.Recommended use of the CPAP machine for moderate (AHI >15), moderate to severe (AHI 15-30) and severe patients (AHI > 30). Recommended weight loss which can reduce AHI especially in weight loss of greater than 5% of BMI. Positional sleep is recommended in those with low AHI, low-moderate BMI, and younger age. For severe sleep apnea, the hypoglossal nerve stimulator was recommended as well.\par \par problem 5: GERD (Francisco) \par -continue AcipHex 20 mg before breakfast\par \par -Rule of 2s: avoid eating too much, eating too late, eating too spicy, eating two hours before bed\par -Things to avoid including overeating, spicy foods, tight clothing, eating within three hours of bed, this list is not all inclusive. \par -For treatment of reflux, possible options discussed including diet control, H2 blockers, PPIs, as well as coating motility agents discussed as treatment options. Timing of meals and proximity of last meal to sleep were discussed. If symptoms persist, a formal gastrointestinal evaluation is needed.\par \par problem 6: atypical chest pain (resolved) \par -felt to be related to reflux and anxiety \par \par problem 7: anxiety \par -recommended to read: "The Gift of Maybe," by Rebeca Aguilar \par \par problem 8: overweight\par -recommended to try WW. \par Weight loss, exercise, and diet control were discussed and are highly encouraged. Treatment options were given such as, aqua therapy, and contacting a nutritionist. Recommended to use the elliptical, stationary bike, less use of treadmill. Mindful eating was explained to the patient Obesity is associated with worsening asthma, shortness of breath, and potential for cardiac disease, diabetes, and other underlying medical conditions. \par \par Problem 8A: Pre-op Clearance Lumbar Disc Surgery (FOR L4 AND L5 BACK SURGERY) 9/3/2020\par -at this point in time there are no absolute pulmonary contraindications to go forward with the planned procedure \par -at the time of surgery s/he should have optimal pain control, incentive spirometry, early ambulation, DVT and GI prophylaxis.\par  \par \par Problem 9: Health Maintenance/COVID19 Precautions:\par - Clean your hands often. Wash your hands often with soap and water for at least 20 seconds, especially after blowing your nose, coughing, or sneezing, or having been in a public place.\par - If soap and water are not available, use a hand  that contains at least 60% alcohol.\par - To the extent possible, avoid touching high-touch surfaces in public places - elevator buttons, door handles, handrails, handshaking with people, etc. Use a tissue or your sleeve to cover your hand or finger if you must touch something.\par - Wash your hands after touching surfaces in public places.\par - Avoid touching your face, nose, eyes, etc.\par - Clean and disinfect your home to remove germs: practice routine cleaning of frequently touched surfaces (for example: tables, doorknobs, light switches, handles, desks, toilets, faucets, sinks & cell phones)\par - Avoid crowds, especially in poorly ventilated spaces. Your risk of exposure to respiratory viruses like COVID-19 may increase in crowded, closed-in settings with little air circulation if\par there are people in the crowd who are sick. All patients are recommended to practice social distancing and stay at least 6 feet away from others.\par - Avoid all non-essential travel including plane trips, and especially avoid embarking on cruise ships.\par -If COVID-19 is spreading in your community, take extra measures to put distance between yourself and other people to further reduce your risk of being exposed to this new virus.\par -Stay home as much as possible.\par - Consider ways of getting food brought to your house through family, social, or commercial networks\par -Be aware that the virus has been known to live in the air up to 3 hours post exposure, cardboard up to 24 hours post exposure, copper up to 4 hours post exposure, steel and plastic up to 2-3 days post exposure. Risk of transmission from these surfaces are affected by many variables.\par \par Immune Support Recommendations:\par -OTC Vitamin C 500mg BID \par -OTC Quercetin 250-500mg BID \par -OTC Zinc 75-100mg per day \par -OTC Melatonin 1or 2mg a night \par -OTC Vitamin D 1-4000mg per day\par -Tonic Water 8oz\par -Recommended to Stay Hydrated (At least a gallon/day)\par \par Asthma and COVID19:\par You need to make sure your asthma is under control. This often requires the use of inhaled corticosteroids (and sometimes oral corticosteroids). Inhaled corticosteroids do not likely reduce your immune system’s ability to fight infections, but oral corticosteroids may. It is important to use the steps above to protect yourself to limit your exposure to any respiratory virus. \par \par problem 10: health maintenance \par -s/p yearly flu shot 9/3/2020\par -recommended strep pneumonia vaccines: Prevnar-13 vaccine, followed by Pneumo vaccine 23 one year following\par -recommended early intervention for URIs\par -recommended regular osteoporosis evaluations\par -recommended early dermatological evaluations\par -recommended after the age of 50 to the age of 70, colonoscopy every 5 years \par \par F/U in 6 months SPI and NIOX.\par She is encouraged to call with any changes, concerns, or questions.

## 2020-09-03 NOTE — HISTORY OF PRESENT ILLNESS
[FreeTextEntry1] : Ms. Dickerson is a 59 year old female with a history of allergic rhinitis, asthma, atypical chest pain, GERD, CRYSTAL, and SOB presenting to the office today for a follow up visit. His chief complaint is\par \par -she notes sleep is very poor due to back discomfort radiating to lower back and legs\par -she notes issues getting to sleep\par -she notes issues staying asleep\par -she notes pre-op visit for back surgery on L4 and L5\par -she notes denies leaky, drippy, or congested sinuses\par -she notes intermittent active asthma with Sx of SOB and wheeze alleviated by inhaler compliance\par -she notes weight is stable at 140 lbs\par -she notes exercising walking the dog\par -she notes regularly irregular bowels \par \par -denies any headaches, nausea, vomiting, fever, chills, sweats, chest pain, chest pressure, dysphagia, dizziness, leg swelling, leg pain, itchy eyes, itchy ears, heartburn, reflux, or sour taste in the mouth.\par \par

## 2020-11-09 ENCOUNTER — TRANSCRIPTION ENCOUNTER (OUTPATIENT)
Age: 60
End: 2020-11-09

## 2021-01-13 ENCOUNTER — APPOINTMENT (OUTPATIENT)
Dept: PULMONOLOGY | Facility: CLINIC | Age: 61
End: 2021-01-13
Payer: COMMERCIAL

## 2021-01-13 VITALS
DIASTOLIC BLOOD PRESSURE: 74 MMHG | TEMPERATURE: 97.1 F | RESPIRATION RATE: 16 BRPM | OXYGEN SATURATION: 97 % | SYSTOLIC BLOOD PRESSURE: 130 MMHG | BODY MASS INDEX: 26.13 KG/M2 | HEART RATE: 94 BPM | WEIGHT: 142 LBS | HEIGHT: 62 IN

## 2021-01-13 PROCEDURE — 99214 OFFICE O/P EST MOD 30 MIN: CPT

## 2021-01-13 PROCEDURE — 99072 ADDL SUPL MATRL&STAF TM PHE: CPT

## 2021-01-13 RX ORDER — FLUTICASONE PROPIONATE 93 UG/1
93 SPRAY, METERED NASAL
Qty: 3 | Refills: 1 | Status: ACTIVE | COMMUNITY
Start: 2021-01-13 | End: 1900-01-01

## 2021-01-13 NOTE — REVIEW OF SYSTEMS
[Nasal Congestion] : nasal congestion [Diarrhea] : diarrhea [Arthralgias] : arthralgias [Chronic Pain] : chronic pain [Headache] : headache [Negative] : Endocrine [Cough] : no cough [Wheezing] : no wheezing [Chest Discomfort] : no chest discomfort [GERD] : no gerd [Constipation] : no constipation [Dysphagia] : no dysphagia [Dizziness] : no dizziness

## 2021-01-13 NOTE — HISTORY OF PRESENT ILLNESS
[FreeTextEntry1] : Ms. Dickerson is a 60 year old female with a history of allergic rhinitis, asthma, atypical chest pain, GERD, CRYSTAL, and SOB presenting to the office today for a follow up visit. His chief complaint is sinus issues\par -she is s/p spinal fusion and laminectomy, and she feels improved. She still wants to have work done on her knee.\par -she reports her energy level is terrible, and didn’t want to get out of bed this morning\par -she notes her sleep quality is poor as she is waking up many times during the night. She is in bed for 7 hours. She uses peptiva at night\par -she was recently placed on Atorvastatin recently, tolerating well\par -she uses her inhalers whenever she is exposed to a trigger\par -she notes she uses a humidifier at night, and she feels like her throat closes up and is unsure why\par -she reports her sinuses are active with congestion / sinus headaches. She is not using anything for her sinuses.\par -she reports she has occasional diarrhea due to Celiac dz\par -she denies any coughing, wheezing, chest pain, chest pressure, constipation, dysphagia, dizziness, sour taste in the mouth, heartburn, reflux

## 2021-01-13 NOTE — ADDENDUM
[FreeTextEntry1] : Documented by Rolando Walters acting as a scribe for Dr. Mathew Rao on 01/13/2021.\par \par All medical record entries made by the Scribe were at my, Dr. Mathew Rao's, direction and personally dictated by me on 01/13/2021. I have reviewed the chart and agree that the record accurately reflects my personal performance of the history, physical exam, assessment and plan. I have also personally directed, reviewed, and agree with the discharge instructions.

## 2021-01-13 NOTE — PROCEDURE
[FreeTextEntry1] : CT (JAN.25.2020) IMPRESSION: right maxillary sinus postsurgical changes with mucosal thickening and small fluid level, the latter possibly on the basis of acute sinusitis. Remaining paranasal sinuses are well aerated with patent drainage pathways. Mild leftward bowing of the nasal septum. Periapical lucency at tooth 15.

## 2021-01-13 NOTE — ASSESSMENT
[FreeTextEntry1] : Ms. Dickerson is a 60 year old female with vertigo, fibromyalgia, asthma, allergies, GERD, OSAS and mild SOB (non-compliant) presenting to the office today for a follow up pulmonary evaluation. Her number one issue is orthopedic issues / sinus issues.\par \par problem 1: asthma (controlled)\par -continue to use Levalbuterol 1.25% by the nebulizer \par -continue to use Accolate 20 mg BID \par -continue Trelegy 1 puff QD  \par -continue ProAir 2 puffs Q6H, pre-exercise\par -continue to use Ventolin PRN \par \par -Asthma is  believed to be caused by inherited (genetic) and environmental factor, but its exact cause is unknown. Asthma may be triggered by allergens, lung infections, or irritants in the air. Asthma triggers are different for each person\par -Inhaler technique reviewed as well as oral hygiene techniques reviewed with patient. Avoidance of cold air, extremes of temperature, rescue inhaler should be used before exercise. Order of medication reviewed with patient. Recommended use of a cool mist humidifier in the bedroom.\par \par problem 2: allergic rhinitis\par -Add Xhance 1 sniff BID \par -continue to use nasal saline/Xlear; Navage nasal spray\par -can continue OTC antihistamine PRN \par -continue to use Astelin (.15) 1 sniff/nostril BID \par \par -Environmental measures for allergies were encouraged including mattress and pillow cover, air purifier, and environmental controls. \par \par problem 3: acute/chronic sinusitis -Nasal Polyps \par -recommended CT of sinuses w/o contrast\par -Consider Dupixent. \par \par problem 4: CRYSTAL\par -recommended trial of the ProVent nasal strips \par -recommended positional sleep \par -recommended to use "Chin Strap" \par -recommended to use extended release melatonin \par -Sleep apnea is associated with adverse clinical consequences which an affect most organ systems.  Cardiovascular disease risk includes arrhythmias, atrial fibrillation, hypertension, coronary artery disease, and stroke. Metabolic disorders include diabetes type 2, non-alcoholic fatty liver disease. Mood disorder especially depression; and cognitive decline especially in the elderly. Associations with  chronic reflux/Brothers’s esophagus some but not all inclusive. \par -Reasons to assess this include arousal consistent with hypopnea; respiratory events most prominent in REM sleep or supine position; therefore sleep staging and body position are important for accurate diagnosis and estimation of AHI.\par -Treatment options discussed including CPAP/BiPAP machine, oral appliance, ProVent therapy, Oxy-Aid by [employer]pitec, new technologies, or positional sleep.Recommended use of the CPAP machine for moderate (AHI >15), moderate to severe (AHI 15-30) and severe patients (AHI > 30). Recommended weight loss which can reduce AHI especially in weight loss of greater than 5% of BMI. Positional sleep is recommended in those with low AHI, low-moderate BMI, and younger age. For severe sleep apnea, the hypoglossal nerve stimulator was recommended as well.\par \par problem 5: GERD (Francisco) \par -continue AcipHex 20 mg before breakfast\par \par -Rule of 2s: avoid eating too much, eating too late, eating too spicy, eating two hours before bed\par -Things to avoid including overeating, spicy foods, tight clothing, eating within three hours of bed, this list is not all inclusive. \par -For treatment of reflux, possible options discussed including diet control, H2 blockers, PPIs, as well as coating motility agents discussed as treatment options. Timing of meals and proximity of last meal to sleep were discussed. If symptoms persist, a formal gastrointestinal evaluation is needed.\par \par problem 6: atypical chest pain (resolved) \par -felt to be related to reflux and anxiety \par \par problem 7: anxiety \par -recommended to read: "The Gift of Maybe," by Rebeca Aguilar \par \par problem 8: overweight\par -recommended to try WW. \par Weight loss, exercise, and diet control were discussed and are highly encouraged. Treatment options were given such as, aqua therapy, and contacting a nutritionist. Recommended to use the elliptical, stationary bike, less use of treadmill. Mindful eating was explained to the patient Obesity is associated with worsening asthma, shortness of breath, and potential for cardiac disease, diabetes, and other underlying medical conditions. \par \par \par Problem 9: Health Maintenance/COVID19 Precautions:\par - Clean your hands often. Wash your hands often with soap and water for at least 20 seconds, especially after blowing your nose, coughing, or sneezing, or having been in a public place.\par - If soap and water are not available, use a hand  that contains at least 60% alcohol.\par - To the extent possible, avoid touching high-touch surfaces in public places - elevator buttons, door handles, handrails, handshaking with people, etc. Use a tissue or your sleeve to cover your hand or finger if you must touch something.\par - Wash your hands after touching surfaces in public places.\par - Avoid touching your face, nose, eyes, etc.\par - Clean and disinfect your home to remove germs: practice routine cleaning of frequently touched surfaces (for example: tables, doorknobs, light switches, handles, desks, toilets, faucets, sinks & cell phones)\par - Avoid crowds, especially in poorly ventilated spaces. Your risk of exposure to respiratory viruses like COVID-19 may increase in crowded, closed-in settings with little air circulation if\par there are people in the crowd who are sick. All patients are recommended to practice social distancing and stay at least 6 feet away from others.\par - Avoid all non-essential travel including plane trips, and especially avoid embarking on cruise ships.\par -If COVID-19 is spreading in your community, take extra measures to put distance between yourself and other people to further reduce your risk of being exposed to this new virus.\par -Stay home as much as possible.\par - Consider ways of getting food brought to your house through family, social, or commercial networks\par -Be aware that the virus has been known to live in the air up to 3 hours post exposure, cardboard up to 24 hours post exposure, copper up to 4 hours post exposure, steel and plastic up to 2-3 days post exposure. Risk of transmission from these surfaces are affected by many variables.\par \par Immune Support Recommendations:\par -OTC Vitamin C 500mg BID \par -OTC Quercetin 250-500mg BID \par -OTC Zinc 75-100mg per day \par -OTC Melatonin 1or 2mg a night \par -OTC Vitamin D 1-4000mg per day\par -Tonic Water 8oz\par -Recommended to Stay Hydrated (At least a gallon/day)\par \par Asthma and COVID19:\par You need to make sure your asthma is under control. This often requires the use of inhaled corticosteroids (and sometimes oral corticosteroids). Inhaled corticosteroids do not likely reduce your immune system’s ability to fight infections, but oral corticosteroids may. It is important to use the steps above to protect yourself to limit your exposure to any respiratory virus. \par \par problem 10: health maintenance \par -s/p yearly flu shot 9/3/2020\par -recommended strep pneumonia vaccines: Prevnar-13 vaccine, followed by Pneumo vaccine 23 one year following\par -recommended early intervention for URIs\par -recommended regular osteoporosis evaluations\par -recommended early dermatological evaluations\par -recommended after the age of 50 to the age of 70, colonoscopy every 5 years \par \par F/U in 6 months SPI and NIOX.\par She is encouraged to call with any changes, concerns, or questions.

## 2021-03-05 ENCOUNTER — TRANSCRIPTION ENCOUNTER (OUTPATIENT)
Age: 61
End: 2021-03-05

## 2021-03-24 ENCOUNTER — NON-APPOINTMENT (OUTPATIENT)
Age: 61
End: 2021-03-24

## 2021-03-25 ENCOUNTER — LABORATORY RESULT (OUTPATIENT)
Age: 61
End: 2021-03-25

## 2021-03-25 ENCOUNTER — APPOINTMENT (OUTPATIENT)
Dept: INFECTIOUS DISEASE | Facility: CLINIC | Age: 61
End: 2021-03-25
Payer: COMMERCIAL

## 2021-03-25 VITALS
DIASTOLIC BLOOD PRESSURE: 78 MMHG | WEIGHT: 141 LBS | RESPIRATION RATE: 16 BRPM | HEIGHT: 62 IN | SYSTOLIC BLOOD PRESSURE: 137 MMHG | HEART RATE: 93 BPM | TEMPERATURE: 98.5 F | OXYGEN SATURATION: 97 % | BODY MASS INDEX: 25.95 KG/M2

## 2021-03-25 DIAGNOSIS — R76.8 OTHER SPECIFIED ABNORMAL IMMUNOLOGICAL FINDINGS IN SERUM: ICD-10-CM

## 2021-03-25 PROCEDURE — 99072 ADDL SUPL MATRL&STAF TM PHE: CPT

## 2021-03-25 PROCEDURE — 99203 OFFICE O/P NEW LOW 30 MIN: CPT

## 2021-03-25 NOTE — HISTORY OF PRESENT ILLNESS
[FreeTextEntry1] : 59 y/o female comes for visit for positive Lyme serology. \par \par She has fibromyalgia and back and neck pain. She was seen by neurology and labs sent revealed a positive Western blot. Per Neurology, she is thought to have lumbar and cervical radiculopathy and possibly starting medical marijuana soon. \par \par No fever, rash, bug bites. She has dog and is on flea/tick medications. \par \par Her daughter has moved in also and has her own dog not on tick/flea medications. \par \par She lives in [place] and denies camping/hiking or trips to [facility]. Denies bug bites. \par \par

## 2021-03-25 NOTE — CONSULT LETTER
[Dear  ___] : Dear  [unfilled], [Consult Letter:] : I had the pleasure of evaluating your patient, [unfilled]. [( Thank you for referring [unfilled] for consultation for _____ )] : Thank you for referring [unfilled] for consultation for [unfilled] [Please see my note below.] : Please see my note below. [Consult Closing:] : Thank you very much for allowing me to participate in the care of this patient.  If you have any questions, please do not hesitate to contact me. [Sincerely,] : Sincerely, [FreeTextEntry2] : Laura Schoenberg, MD \par [address]\[place] [address] [FreeTextEntry3] : Jonathan Pena\par Attending Physician\par Infectious Disease\par [facility]\par [facility]\par [address]\[place] [address]\par Tel: [phone]\par Fax: [phone]\par Email: [email or web address]\par \par  [DrShiva  ___] : Dr. ZHOU

## 2021-03-25 NOTE — ASSESSMENT
[Treatment Education] : treatment education [Rx Dose / Side Effects] : Rx dose/side effects [FreeTextEntry1] : 61 y/o female comes for visit for positive Lyme serology. \par \par She has no strong epidemiology but her daughters dog. \par \par She has no fever. Her IgM on the test indicates a more recent infection. No other s/s of infection. \par \par This could be false positive also. She also has other diagnosis from before which could explain her symptoms also. \par \par Will repeat the Western blot again. If still positive, will consider Doxycycline. Potential risks of doxycycline explained including GI, photosensitivity and pill esophagitis.  \par \par She got the COVID vaccine already

## 2021-03-25 NOTE — PHYSICAL EXAM
[General Appearance - Alert] : alert [General Appearance - In No Acute Distress] : in no acute distress [Sclera] : the sclera and conjunctiva were normal [PERRL With Normal Accommodation] : pupils were equal in size, round, reactive to light [Extraocular Movements] : extraocular movements were intact [Outer Ear] : the ears and nose were normal in appearance [Oropharynx] : the oropharynx was normal with no thrush [Neck Appearance] : the appearance of the neck was normal [Neck Cervical Mass (___cm)] : no neck mass was observed [Jugular Venous Distention Increased] : there was no jugular-venous distention [Thyroid Diffuse Enlargement] : the thyroid was not enlarged [Auscultation Breath Sounds / Voice Sounds] : lungs were clear to auscultation bilaterally [Heart Sounds] : normal S1 and S2 [Edema] : there was no peripheral edema [Bowel Sounds] : normal bowel sounds [Abdomen Soft] : soft [Abdomen Tenderness] : non-tender [Abdomen Mass (___ Cm)] : no abdominal mass palpated [Costovertebral Angle Tenderness] : no CVA tenderness [Musculoskeletal - Swelling] : no joint swelling [Skin Color & Pigmentation] : normal skin color and pigmentation [] : no rash [No Focal Deficits] : no focal deficits [Oriented To Time, Place, And Person] : oriented to person, place, and time [Affect] : the affect was normal

## 2021-03-26 LAB — B BURGDOR IGG+IGM SER QL IB: NORMAL

## 2021-03-30 LAB
B BURGDOR AB SER-IMP: NEGATIVE
B BURGDOR IGM PATRN SER IB-IMP: NEGATIVE
B BURGDOR18KD IGG SER QL IB: NORMAL
B BURGDOR23KD IGG SER QL IB: NORMAL
B BURGDOR23KD IGM SER QL IB: NORMAL
B BURGDOR28KD IGG SER QL IB: NORMAL
B BURGDOR30KD IGG SER QL IB: NORMAL
B BURGDOR31KD IGG SER QL IB: NORMAL
B BURGDOR39KD IGG SER QL IB: NORMAL
B BURGDOR39KD IGM SER QL IB: NORMAL
B BURGDOR41KD IGG SER QL IB: PRESENT
B BURGDOR41KD IGM SER QL IB: PRESENT
B BURGDOR45KD IGG SER QL IB: NORMAL
B BURGDOR58KD IGG SER QL IB: NORMAL
B BURGDOR66KD IGG SER QL IB: NORMAL
B BURGDOR93KD IGG SER QL IB: NORMAL

## 2021-04-04 ENCOUNTER — RX RENEWAL (OUTPATIENT)
Age: 61
End: 2021-04-04

## 2021-05-06 ENCOUNTER — RX RENEWAL (OUTPATIENT)
Age: 61
End: 2021-05-06

## 2021-05-20 ENCOUNTER — APPOINTMENT (OUTPATIENT)
Dept: PULMONOLOGY | Facility: CLINIC | Age: 61
End: 2021-05-20

## 2021-05-24 ENCOUNTER — RESULT REVIEW (OUTPATIENT)
Age: 61
End: 2021-05-24

## 2021-06-01 ENCOUNTER — APPOINTMENT (OUTPATIENT)
Dept: MAMMOGRAPHY | Facility: IMAGING CENTER | Age: 61
End: 2021-06-01
Payer: COMMERCIAL

## 2021-06-01 ENCOUNTER — APPOINTMENT (OUTPATIENT)
Dept: ULTRASOUND IMAGING | Facility: IMAGING CENTER | Age: 61
End: 2021-06-01

## 2021-06-01 ENCOUNTER — OUTPATIENT (OUTPATIENT)
Dept: OUTPATIENT SERVICES | Facility: HOSPITAL | Age: 61
LOS: 1 days | End: 2021-06-01
Payer: COMMERCIAL

## 2021-06-01 ENCOUNTER — APPOINTMENT (OUTPATIENT)
Dept: RADIOLOGY | Facility: IMAGING CENTER | Age: 61
End: 2021-06-01
Payer: COMMERCIAL

## 2021-06-01 DIAGNOSIS — Z00.8 ENCOUNTER FOR OTHER GENERAL EXAMINATION: ICD-10-CM

## 2021-06-01 PROCEDURE — 77080 DXA BONE DENSITY AXIAL: CPT

## 2021-06-01 PROCEDURE — 76642 ULTRASOUND BREAST LIMITED: CPT | Mod: 26,RT

## 2021-06-01 PROCEDURE — 77066 DX MAMMO INCL CAD BI: CPT | Mod: 26

## 2021-06-01 PROCEDURE — 77066 DX MAMMO INCL CAD BI: CPT

## 2021-06-01 PROCEDURE — 77080 DXA BONE DENSITY AXIAL: CPT | Mod: 26

## 2021-06-01 PROCEDURE — G0279: CPT

## 2021-06-01 PROCEDURE — G0279: CPT | Mod: 26

## 2021-06-01 PROCEDURE — 76642 ULTRASOUND BREAST LIMITED: CPT

## 2021-06-16 ENCOUNTER — TRANSCRIPTION ENCOUNTER (OUTPATIENT)
Age: 61
End: 2021-06-16

## 2021-07-12 ENCOUNTER — NON-APPOINTMENT (OUTPATIENT)
Age: 61
End: 2021-07-12

## 2021-07-16 ENCOUNTER — NON-APPOINTMENT (OUTPATIENT)
Age: 61
End: 2021-07-16

## 2021-07-16 ENCOUNTER — APPOINTMENT (OUTPATIENT)
Dept: PULMONOLOGY | Facility: CLINIC | Age: 61
End: 2021-07-16
Payer: COMMERCIAL

## 2021-07-16 VITALS
SYSTOLIC BLOOD PRESSURE: 128 MMHG | HEART RATE: 111 BPM | HEIGHT: 62 IN | BODY MASS INDEX: 26.68 KG/M2 | OXYGEN SATURATION: 98 % | WEIGHT: 145 LBS | TEMPERATURE: 98 F | RESPIRATION RATE: 16 BRPM | DIASTOLIC BLOOD PRESSURE: 80 MMHG

## 2021-07-16 PROCEDURE — 99072 ADDL SUPL MATRL&STAF TM PHE: CPT

## 2021-07-16 PROCEDURE — 99214 OFFICE O/P EST MOD 30 MIN: CPT | Mod: 25

## 2021-07-16 PROCEDURE — 94010 BREATHING CAPACITY TEST: CPT

## 2021-07-16 NOTE — REVIEW OF SYSTEMS
[Fatigue] : fatigue [EDS] : EDS [Arthralgias] : arthralgias [Back Pain] : back pain [Chronic Pain] : chronic pain [Headache] : headache [Negative] : Endocrine

## 2021-07-18 NOTE — HISTORY OF PRESENT ILLNESS
[TextBox_4] : Ms. Dickerson is a 60 year old female with a history of allergic rhinitis, asthma, atypical chest pain, GERD, CRYSTAL, and SOB presenting to the office today for a follow up visit. \par \par Her chief concern is be re-tested for sleep apnea.\par \par She admits to difficulty initiating sleep d/t chronic leg and back pain, so her bedtime varies. She awakens between 6-7AM. She admits to multiple nighttime awakenings and nonrestorative sleep.  She states she has an adjustable bed and has been sleeping with her HOB elevated which has helped her sleep apnea. She has awoken herself from snoring. She admits to EDS and sleepy driving.  She awakens with dry mouth and AM headache. \par \par She denies fever/chills, decreased appetite, SOB @ rest or exertion, chest tightness, cough, or any other issues at this time. \par \par \par \par

## 2021-07-18 NOTE — PROCEDURE
[FreeTextEntry1] : SPI  performed in office WNL \par FEV1: 101% \par FEV1/FVC Ratio: 98% \par TKY73-32%: 91% \par \par \par

## 2021-07-18 NOTE — ASSESSMENT
[FreeTextEntry1] : Ms. Dickerson is a 60 year old female with vertigo, fibromyalgia, asthma, allergies, GERD, OSAS and mild SOB (non-compliant) presenting to the office today for a follow up pulmonary evaluation. Her number one issue is orthopedic issues / sinus issues.\par \par 1. asthma:\par - continue to use Levalbuterol 1.25% by the nebulizer \par - patient not on maintenance inhaler at this time. \par \par 2. CRYSTAL: \par - I have discussed all the negative health consequences associated with obstructive and central sleep apnea including heart conditions/MI, hypertension, diabetes, chronic inflammation, memory issues, stroke, obesity, decreased libido, sleep related accidents, as well as anxiety and depression. Additional recommendations included: Avoid alcohol and sedatives at bedtime. Proper sleep hygiene such as maintaining a regular sleep routine, avoiding naps if possible, not watching TV or reading in bed,  and maintaining a quiet, comfortable bedroom. Sleepy driving avoidance and risks discussed with patient. Diet, exercise and weight loss suggested\par - Virtuox HST RX'ed.\par \par 3. allergic rhinitis:\par - continue OTC antihistamine PRN \par - continue to use Astelin (.15) 1 sniff/nostril BID \par \par Patient to follow up with Dr. Rao as scheduled.\par Patient to call with further questions and concerns.\par Patient verbalizes understanding of care plan and is agreeable.\par \par

## 2021-08-11 ENCOUNTER — APPOINTMENT (OUTPATIENT)
Dept: PULMONOLOGY | Facility: CLINIC | Age: 61
End: 2021-08-11
Payer: COMMERCIAL

## 2021-08-11 PROCEDURE — 99441: CPT

## 2021-08-11 NOTE — ASSESSMENT
[FreeTextEntry1] : Ms. Dickerson is a 60 year old female with vertigo, fibromyalgia, asthma, allergies, GERD, OSAS and mild SOB (non-compliant) presenting to the office today for a follow up pulmonary evaluation.\par \par 1. Asthma:\par - continue to use Levalbuterol 1.25% by the nebulizer \par - patient not on maintenance inhaler at this time. \par \par 2. CRYSTAL: \par - I have discussed all the negative health consequences associated with obstructive and central sleep apnea including heart conditions/MI, hypertension, diabetes, chronic inflammation, memory issues, stroke, obesity, decreased libido, sleep related accidents, as well as anxiety and depression. Additional recommendations included: Avoid alcohol and sedatives at bedtime. Proper sleep hygiene such as maintaining a regular sleep routine, avoiding naps if possible, not watching TV or reading in bed, and maintaining a quiet, comfortable bedroom. Sleepy driving avoidance and risks discussed with patient. Diet, exercise and weight loss suggested.\par - CPAP Intolerant. \par - Patient to contact the office after receiving Oral appliance to have f/u HST w/ device in place. \par \par 3. allergic rhinitis:\par - continue OTC antihistamine PRN \par - continue to use Astelin (.15) 1 sniff/nostril BID \par \par Patient to follow up with Dr. Rao as scheduled.\par Patient to call with further questions and concerns.\par Patient verbalizes understanding of care plan and is agreeable.\par \par **I HAVE SPENT 9 MINS ON THE PHONE WITH THIS PATIENT**\par \par

## 2021-08-11 NOTE — DISCUSSION/SUMMARY
[FreeTextEntry1] : 7/24/21 Virtuox HST c/w mild CRYSTAL. RDI of 13.2. \par \par Oral appliances are safe, noninvasive and highly effective for treating snoring and varying severities of obstructive sleep apnea. The oral devices are designed to position the lower jaw slightly forward and down. This opens the airway. These devices are simple, portable, and silent, but they can be almost as expensive as CPAP. This option is not as suitable with those that have moderate to severe sleep apnea as well as central or complex sleep apnea. I did discuss the need for a follow up sleep study once the oral appliance was made and fit for comfort to actively assess how well it is treating the condition.

## 2021-08-11 NOTE — REASON FOR VISIT
[Home] : at home, [unfilled] , at the time of the visit. [Medical Office: ([facility])___] : at the medical office located in  [Verbal consent obtained from patient] : the patient, [unfilled] [Follow-Up] : a follow-up visit [Asthma] : asthma [Sleep Apnea] : sleep apnea

## 2021-08-11 NOTE — HISTORY OF PRESENT ILLNESS
[TextBox_4] : Ms. Dickerson is a 60 year old female with a history of allergic rhinitis, asthma, atypical chest pain, GERD, CRYSTAL, and SOB presenting to the office today for a follow up visit. \par \par Her chief concern is be re-tested for sleep apnea.\par \par She admits to difficulty initiating sleep d/t chronic leg and back pain, so her bedtime varies. She awakens between 6-7AM. She admits to multiple nighttime awakenings and nonrestorative sleep. She states she has an adjustable bed and has been sleeping with her HOB elevated which has helped her sleep apnea. She has awoken herself from snoring. She admits to EDS and sleepy driving. She awakens with dry mouth and AM headache. \par \par She has tired CPAP in the past, but was unable to tolerate nasal pillows and anything being attached near her face.  She would awaken multiple times per night to remove her device.  She tried 8-9 types of mask in the past. \par \par She denies fever/chills, decreased appetite, SOB @ rest or exertion, chest tightness, cough, or any other issues at this time. \par

## 2021-08-11 NOTE — PHYSICAL EXAM
[No Resp Distress] : no resp distress [Oriented x3] : oriented x3 [Normal Mood] : normal mood [Normal Affect] : normal affect [TextBox_2] : Unable to evaluate d/t telephone visit.  [TextBox_11] : Unable to evaluate d/t telephone visit.  [TextBox_44] : Unable to evaluate d/t telephone visit.  [TextBox_68] : Unable to evaluate d/t telephone visit.  [TextBox_54] : Unable to evaluate d/t telephone visit.  [TextBox_80] : Unable to evaluate d/t telephone visit.  [TextBox_99] : Unable to evaluate d/t telephone visit.  [TextBox_105] : Unable to evaluate d/t telephone visit.  [TextBox_125] : Unable to evaluate d/t telephone visit.  [TextBox_132] : Unable to evaluate d/t telephone visit.

## 2021-08-23 ENCOUNTER — APPOINTMENT (OUTPATIENT)
Dept: DISASTER EMERGENCY | Facility: CLINIC | Age: 61
End: 2021-08-23

## 2021-08-23 ENCOUNTER — LABORATORY RESULT (OUTPATIENT)
Age: 61
End: 2021-08-23

## 2021-10-01 ENCOUNTER — APPOINTMENT (OUTPATIENT)
Dept: PULMONOLOGY | Facility: CLINIC | Age: 61
End: 2021-10-01

## 2021-11-02 ENCOUNTER — TRANSCRIPTION ENCOUNTER (OUTPATIENT)
Age: 61
End: 2021-11-02

## 2021-11-10 ENCOUNTER — RX RENEWAL (OUTPATIENT)
Age: 61
End: 2021-11-10

## 2021-11-22 ENCOUNTER — RX RENEWAL (OUTPATIENT)
Age: 61
End: 2021-11-22

## 2021-12-01 ENCOUNTER — NON-APPOINTMENT (OUTPATIENT)
Age: 61
End: 2021-12-01

## 2022-01-05 ENCOUNTER — RX RENEWAL (OUTPATIENT)
Age: 62
End: 2022-01-05

## 2022-01-21 ENCOUNTER — TRANSCRIPTION ENCOUNTER (OUTPATIENT)
Age: 62
End: 2022-01-21

## 2022-02-15 ENCOUNTER — OUTPATIENT (OUTPATIENT)
Dept: OUTPATIENT SERVICES | Facility: HOSPITAL | Age: 62
LOS: 1 days | End: 2022-02-15
Payer: COMMERCIAL

## 2022-02-15 VITALS
WEIGHT: 145.06 LBS | SYSTOLIC BLOOD PRESSURE: 128 MMHG | HEART RATE: 62 BPM | HEIGHT: 62 IN | TEMPERATURE: 97 F | OXYGEN SATURATION: 98 % | DIASTOLIC BLOOD PRESSURE: 87 MMHG | RESPIRATION RATE: 17 BRPM

## 2022-02-15 DIAGNOSIS — Z98.890 OTHER SPECIFIED POSTPROCEDURAL STATES: Chronic | ICD-10-CM

## 2022-02-15 DIAGNOSIS — G47.33 OBSTRUCTIVE SLEEP APNEA (ADULT) (PEDIATRIC): ICD-10-CM

## 2022-02-15 DIAGNOSIS — G56.01 CARPAL TUNNEL SYNDROME, RIGHT UPPER LIMB: ICD-10-CM

## 2022-02-15 DIAGNOSIS — I10 ESSENTIAL (PRIMARY) HYPERTENSION: ICD-10-CM

## 2022-02-15 DIAGNOSIS — J45.909 UNSPECIFIED ASTHMA, UNCOMPLICATED: ICD-10-CM

## 2022-02-15 DIAGNOSIS — Z96.659 PRESENCE OF UNSPECIFIED ARTIFICIAL KNEE JOINT: Chronic | ICD-10-CM

## 2022-02-15 DIAGNOSIS — M54.9 DORSALGIA, UNSPECIFIED: ICD-10-CM

## 2022-02-15 LAB
ANION GAP SERPL CALC-SCNC: 13 MMOL/L — SIGNIFICANT CHANGE UP (ref 7–14)
BUN SERPL-MCNC: 20 MG/DL — SIGNIFICANT CHANGE UP (ref 7–23)
CALCIUM SERPL-MCNC: 9.7 MG/DL — SIGNIFICANT CHANGE UP (ref 8.4–10.5)
CHLORIDE SERPL-SCNC: 105 MMOL/L — SIGNIFICANT CHANGE UP (ref 98–107)
CO2 SERPL-SCNC: 23 MMOL/L — SIGNIFICANT CHANGE UP (ref 22–31)
CREAT SERPL-MCNC: 0.94 MG/DL — SIGNIFICANT CHANGE UP (ref 0.5–1.3)
GLUCOSE SERPL-MCNC: 89 MG/DL — SIGNIFICANT CHANGE UP (ref 70–99)
HCT VFR BLD CALC: 39.8 % — SIGNIFICANT CHANGE UP (ref 34.5–45)
HGB BLD-MCNC: 13.2 G/DL — SIGNIFICANT CHANGE UP (ref 11.5–15.5)
MCHC RBC-ENTMCNC: 29.8 PG — SIGNIFICANT CHANGE UP (ref 27–34)
MCHC RBC-ENTMCNC: 33.2 GM/DL — SIGNIFICANT CHANGE UP (ref 32–36)
MCV RBC AUTO: 89.8 FL — SIGNIFICANT CHANGE UP (ref 80–100)
NRBC # BLD: 0 /100 WBCS — SIGNIFICANT CHANGE UP
NRBC # FLD: 0 K/UL — SIGNIFICANT CHANGE UP
PLATELET # BLD AUTO: 367 K/UL — SIGNIFICANT CHANGE UP (ref 150–400)
POTASSIUM SERPL-MCNC: 4 MMOL/L — SIGNIFICANT CHANGE UP (ref 3.5–5.3)
POTASSIUM SERPL-SCNC: 4 MMOL/L — SIGNIFICANT CHANGE UP (ref 3.5–5.3)
RBC # BLD: 4.43 M/UL — SIGNIFICANT CHANGE UP (ref 3.8–5.2)
RBC # FLD: 13.7 % — SIGNIFICANT CHANGE UP (ref 10.3–14.5)
SODIUM SERPL-SCNC: 141 MMOL/L — SIGNIFICANT CHANGE UP (ref 135–145)
WBC # BLD: 5.98 K/UL — SIGNIFICANT CHANGE UP (ref 3.8–10.5)
WBC # FLD AUTO: 5.98 K/UL — SIGNIFICANT CHANGE UP (ref 3.8–10.5)

## 2022-02-15 PROCEDURE — 93010 ELECTROCARDIOGRAM REPORT: CPT

## 2022-02-15 NOTE — H&P PST ADULT - ASSESSMENT
pre op diagnosis of Carpal tunnel syndrome Right upper limb, for preop evaluation prior to scheduled surgery- Right endoscopic carpal tunnel release.

## 2022-02-15 NOTE — H&P PST ADULT - PROBLEM SELECTOR PLAN 2
Patient instructed to take zafirleukast with a sip of water on the morning of procedure.   Continue inhalers. Patient instructed to take zafirlukast with a sip of water on the morning of procedure.   Continue inhalers.

## 2022-02-15 NOTE — H&P PST ADULT - PROBLEM SELECTOR PLAN 3
Patient instructed to take olmesartan with a sip of water on the morning of procedure. Patient instructed to take olmesartan with a sip of water on the morning of procedure.    Pt with multiple comorbidities-- requesting medical clearance prior to surgery.  Will request comparison EKG from PCP

## 2022-02-15 NOTE — H&P PST ADULT - PROBLEM SELECTOR PLAN 5
Patient is on medical marijuana for chronic back pain- will sent email to [facility].    Patient follows up with Dr Schoenberg neurologist for medical marijuana.

## 2022-02-15 NOTE — H&P PST ADULT - NSICDXPASTMEDICALHX_GEN_ALL_CORE_FT
PAST MEDICAL HISTORY:  2019 novel coronavirus disease (COVID-19) 12/2021    Asthma exercise induced and stress induced asthma - pt reports last exacerbation on 02/2021    H/O scoliosis     H/O spinal stenosis     Obstructive Sleep Apnea on oral mouth device- follows up with pulmonologist    Osteoarthritis     Rotator Cuff Disorder left

## 2022-02-15 NOTE — H&P PST ADULT - MUSCULOSKELETAL COMMENTS
left knee pain, pre op diagnosis- carpal tunnel syndrome of right hand pt reports of fibromyalgia, lower back pain, b/l knee pain on medical marijuana and motrin

## 2022-02-15 NOTE — H&P PST ADULT - VENOUS THROMBOEMBOLISM FOR WOMEN ONLY
[FreeTextEntry1] : The patient was examined. His blood pressure was 133/82, and his pulse was 73. His lungs were clear to auscultation. Cardiac exam was  negative for murmurs rubs or gallops. The remainder of his physical exam was unremarkable. The EKG showed normal sinus rhythm, ,with no acute changes. .We will continue him on his current medication. He will return in 4  months,or earlier if needed. 
(0) indicator not present

## 2022-02-15 NOTE — H&P PST ADULT - PROBLEM SELECTOR PLAN 1
Pt is tentatively scheduled  for Right endoscopic carpal tunnel release 02/28/22.  Pre-op instructions provided. Pt given verbal and written instructions with teach back on chlorhexidine wash and pepcid. Pt verbalized understanding with return demonstration.     Pt strongly advised to follow up with surgeon to discuss COVID testing requirements prior to procedure. Pt is tentatively scheduled  for Right endoscopic carpal tunnel release 02/28/22.  Pre-op instructions provided. Pt given verbal and written instructions with teach back on chlorhexidine wash and pt takes own rabeprazole Pt verbalized understanding with return demonstration.     Pt strongly advised to follow up with surgeon to discuss COVID testing requirements prior to procedure.

## 2022-02-15 NOTE — H&P PST ADULT - NSICDXPASTSURGICALHX_GEN_ALL_CORE_FT
PAST SURGICAL HISTORY:  Ectopic Pregnancy 2001     S/P hammer toe correction 2015,2016,2017    S/P knee replacement left 12/13/21    S/P Lumpectomy of Breast left  benign 1985     S/P rotator cuff repair left 2013

## 2022-02-15 NOTE — H&P PST ADULT - PRIMARY CARE PROVIDER
[facility] Imer Mackenzie[facility] named [facility]  Dr Imer Bee , Pain management - Dr Mabel Aragon  [phone]

## 2022-02-15 NOTE — H&P PST ADULT - RESPIRATORY AND THORAX COMMENTS
pt reports no recent asthma exacerbation, pt states she has exercise and stress induced asthma- well controlled by inhalers

## 2022-02-15 NOTE — H&P PST ADULT - HISTORY OF PRESENT ILLNESS
61 year old female with PMH of Allergic Rhinitis, Asthma well controlled on inhalers, Spinal stenosis- s/p L4-L5 fusion and laminectomy, scoliosis, GERD, CRYSTAL- on oral device/ not on CPAP, presents to PST with pre op diagnosis of Carpal tunnel syndrome Right upper limb, for preop evaluation prior to scheduled surgery- Right endoscopic carpal tunnel release.

## 2022-02-25 VITALS
OXYGEN SATURATION: 97 % | WEIGHT: 145.06 LBS | SYSTOLIC BLOOD PRESSURE: 109 MMHG | DIASTOLIC BLOOD PRESSURE: 70 MMHG | HEIGHT: 62 IN | RESPIRATION RATE: 16 BRPM | TEMPERATURE: 97 F | HEART RATE: 90 BPM

## 2022-02-25 NOTE — ASU PREOPERATIVE ASSESSMENT, ADULT ([facility] ONLY) - FALL HARM RISK - FALL HARM RISK
PT DAILY TREATMENT NOTE     Patient Name: Bettie Cordero  Date:2020  : 1966  [x]  Patient  Verified  Payor: Ritchie Burns / Plan: [employer] / Product Type: Managed Care Medicaid /    In time:1115  Out time:1155  Total Treatment Time (min): 40  Visit #: 3 of 8    Treatment Area: Shoulder pain [M25.519]    SUBJECTIVE  Pain Level (0-10 scale): 4/10  Any medication changes, allergies to medications, adverse drug reactions, diagnosis change, or new procedure performed?: [x] No    [] Yes (see summary sheet for update)  Subjective functional status/changes:   [] No changes reported  Pt stated that his pain is not too bad    OBJECTIVE    Modality rationale: decrease inflammation and decrease pain to improve the patients ability to increase ease with ADLs   Min Type Additional Details    [] Estim:  []Unatt       []IFC  []Premod                        []Other:  []w/ice   []w/heat  Position:  Location:    [] Estim: []Att    []TENS instruct  []NMES                    []Other:  []w/US   []w/ice   []w/heat  Position:  Location:    []  Traction: [] Cervical       []Lumbar                       [] Prone          []Supine                       []Intermittent   []Continuous Lbs:  [] before manual  [] after manual    []  Ultrasound: []Continuous   [] Pulsed                           []1MHz   []3MHz W/cm2:  Location:    []  Iontophoresis with dexamethasone         Location: [] Take home patch   [] In clinic   10 [x]  Ice     []  heat  []  Ice massage  []  Laser   []  Anodyne Position:seated  Location:right sh    []  Laser with stim  []  Other:  Position:  Location:    []  Vasopneumatic Device Pressure:       [] lo [] med [] hi   Temperature: [] lo [] med [] hi   [x] Skin assessment post-treatment:  [x]intact []redness- no adverse reaction    []redness - adverse reaction:     14 min Therapeutic Exercise:  [x]? ??? See flow sheet :   Rationale: increase ROM and increase strength to improve the patients ability to perform ADLs with increased ease     8 min Therapeutic Activity:  [x]? ???  See flow sheet : gripper, wall slides; education on performing passive or active assisted shoulder movements   Rationale: increase ROM and increase strength  to improve the patients ability to perform ADLs with increased ease     8 min Neuromuscular Re-education:  [x]? ???  See flow sheet : PROM with oscillations, scapular mobs   Rationale: improve coordination  to improve the patients ability to reach and perform ADLs with increased ease    With   [x] TE   [] TA   [] neuro   [] other: Patient Education: [x] Review HEP    [] Progressed/Changed HEP based on:   [] positioning   [] body mechanics   [] transfers   [] heat/ice application    [] other:      Other Objective/Functional Measures:   Was unable to perform S/L sh abd  No difficulty with SA punches    Pain Level (0-10 scale) post treatment: 4/10    ASSESSMENT/Changes in Function:   Pt is slowly progressing toward goals per protocol. Pt cont with decreased strength and AROM in the right shoulder. Pt cont to report improved ease with most ADLs. Pt cont with mild to moderate pain in the right sh    Patient will continue to benefit from skilled PT services to modify and progress therapeutic interventions, address functional mobility deficits, address ROM deficits, address strength deficits, analyze and cue movement patterns, analyze and modify body mechanics/ergonomics, assess and modify postural abnormalities and instruct in home and community integration to attain remaining goals. []  See Plan of Care  [x]  See progress note/recertification  []  See Discharge Summary         Progress towards goals / Updated goals:  1. Pt will report an improvement in at worst pain to 6/10 to improve ability to perform ADLs. 2. Pt will report being able to sleep for 3 hours or more at a time to improve sleep quality.   3. Pt will increase PROM right shoulder flex/ABD to 140 degs, ER to 60 degs, IR to 55 degs to improve the pt's ability to progress through protocol.   4. Pt will increase FOTO score by 20 points to increase ease with functional tasks and driving    PLAN  []  Upgrade activities as tolerated     [x]  Continue plan of care  []  Update interventions per flow sheet       []  Discharge due to:_  []  Other:_      Yoana Ochoa, CORBY 12/14/2020  6:44 AM    Future Appointments   Date Time Provider Abdullahi Justina   12/14/2020 11:15 AM Deepali Ahmadi PTA [facility]   12/18/2020  2:15 PM Cesar Olson, PT [facility]   12/21/2020  8:15 AM Deepali Ahmadi, PTA [facility]   12/24/2020  9:00 AM Cesar Olson, PT [facility]   12/28/2020  8:15 AM Deepali Ahmadi PTA [facility]   12/31/2020  9:00 AM Cesar Olson, PT [facility]   1/15/2021  3:00 PM JOHN Duggan [facility] BS AMB No indicators present

## 2022-02-25 NOTE — ASU PREOPERATIVE ASSESSMENT, ADULT ([facility] ONLY) - FALL HARM RISK - UNIVERSAL INTERVENTIONS
Bed in lowest position, wheels locked, appropriate side rails in place/Call bell, personal items and telephone in reach/Instruct patient to call for assistance before getting out of bed or chair/Non-slip footwear when patient is out of bed/[place] to call system/Physically safe environment - no spills, clutter or unnecessary equipment/Purposeful Proactive Rounding/Room/bathroom lighting operational, light cord in reach

## 2022-02-27 ENCOUNTER — TRANSCRIPTION ENCOUNTER (OUTPATIENT)
Age: 62
End: 2022-02-27

## 2022-02-28 ENCOUNTER — OUTPATIENT (OUTPATIENT)
Dept: OUTPATIENT SERVICES | Facility: HOSPITAL | Age: 62
LOS: 1 days | Discharge: HOME | End: 2022-02-28

## 2022-02-28 VITALS
TEMPERATURE: 98 F | HEART RATE: 82 BPM | DIASTOLIC BLOOD PRESSURE: 74 MMHG | OXYGEN SATURATION: 99 % | SYSTOLIC BLOOD PRESSURE: 121 MMHG | RESPIRATION RATE: 20 BRPM

## 2022-02-28 DIAGNOSIS — Z96.659 PRESENCE OF UNSPECIFIED ARTIFICIAL KNEE JOINT: Chronic | ICD-10-CM

## 2022-02-28 DIAGNOSIS — G56.01 CARPAL TUNNEL SYNDROME, RIGHT UPPER LIMB: ICD-10-CM

## 2022-02-28 DIAGNOSIS — Z98.890 OTHER SPECIFIED POSTPROCEDURAL STATES: Chronic | ICD-10-CM

## 2022-02-28 NOTE — ASU DISCHARGE PLAN (ADULT/PEDIATRIC) - CARE PROVIDER_API CALL
Korey Reyna)  Orthopaedic Surgery; Surgery of the Hand  [address], [address]  [place] [address]  Phone: [phone]  Fax: [phone]  Follow Up Time: 2 weeks

## 2022-02-28 NOTE — ASU DISCHARGE PLAN (ADULT/PEDIATRIC) - PAIN MANAGEMENT
Prescriptions electronically submitted to pharmacy from [facility] pain medication can be taken at 2pm with food/Prescriptions electronically submitted to pharmacy from [facility]

## 2022-02-28 NOTE — ASU DISCHARGE PLAN (ADULT/PEDIATRIC) - NO EXERCISE DURATION
Per email from Jodie called [facility] and scheduled a BP with Jodie/Lorraine on 10/3/17 at 8:30 AM. I also responded to the email and let her know that they are currently seeing a gynecologist and Mira may require surgery.  
until cleared by Dr. Reyna

## 2022-03-11 VITALS
WEIGHT: 145.06 LBS | SYSTOLIC BLOOD PRESSURE: 130 MMHG | DIASTOLIC BLOOD PRESSURE: 76 MMHG | TEMPERATURE: 98 F | HEART RATE: 72 BPM | HEIGHT: 62 IN | OXYGEN SATURATION: 100 % | RESPIRATION RATE: 17 BRPM

## 2022-03-11 PROBLEM — Z87.39 PERSONAL HISTORY OF OTHER DISEASES OF THE MUSCULOSKELETAL SYSTEM AND CONNECTIVE TISSUE: Chronic | Status: ACTIVE | Noted: 2022-02-15

## 2022-03-11 PROBLEM — M19.90 UNSPECIFIED OSTEOARTHRITIS, UNSPECIFIED SITE: Chronic | Status: ACTIVE | Noted: 2022-02-15

## 2022-03-11 NOTE — ASU PREOPERATIVE ASSESSMENT, ADULT ([facility] ONLY) - TEACHING/LEARNING OTHER LEARNERS
Attending Physician Attestation Note:    Resident Physician: Kelsy Watt DO    Pt is a 9 year old female here for the following concerns:  Chief Complaint   Patient presents with   • Well Child     Visit Vitals  /60   Pulse 96   Temp 98.7 °F (37.1 °C) (Oral)   Resp 20   Ht 4' 10\" (1.473 m)   Wt 44.7 kg   BMI 20.61 kg/m²       Impression and Plan:  WCE  Denies concerns, doing well, 4 meals a day, drinking milk in small amount  Eating fruits and veggies   Discussed about safety, screen time discussed as well as importance of physical activity  Good student, reads a lot  No menses yet     Vaccines: will only get Tdap, refusing meningo and HPV today    The patient was seen by me as well.     I agree with the history, exam and plan as noted by the resident physician.    Please see resident note for details.      Deja Pruitt MD  8/24/2017               Edmund/significant other

## 2022-03-13 ENCOUNTER — TRANSCRIPTION ENCOUNTER (OUTPATIENT)
Age: 62
End: 2022-03-13

## 2022-03-13 RX ORDER — SODIUM CHLORIDE 9 MG/ML
1000 INJECTION, SOLUTION INTRAVENOUS
Refills: 0 | Status: DISCONTINUED | OUTPATIENT
Start: 2022-03-14 | End: 2022-03-28

## 2022-03-13 NOTE — ASU DISCHARGE PLAN (ADULT/PEDIATRIC) - NS MD DC FALL RISK RISK
For information on Fall & Injury Prevention, visit: [email or web address] OR  [email or web address] OR  https://www.cdc.gov/steadi/patient.html

## 2022-03-13 NOTE — ASU DISCHARGE PLAN (ADULT/PEDIATRIC) - CARE PROVIDER_API CALL
Korey Reyna)  Orthopaedic Surgery; Surgery of the Hand  [address], [address]  [place] [address]  Phone: [phone]  Fax: [phone]  Follow Up Time:

## 2022-03-14 ENCOUNTER — OUTPATIENT (OUTPATIENT)
Dept: OUTPATIENT SERVICES | Facility: HOSPITAL | Age: 62
LOS: 1 days | Discharge: HOME | End: 2022-03-14

## 2022-03-14 VITALS
HEART RATE: 76 BPM | RESPIRATION RATE: 17 BRPM | SYSTOLIC BLOOD PRESSURE: 137 MMHG | DIASTOLIC BLOOD PRESSURE: 76 MMHG | OXYGEN SATURATION: 97 %

## 2022-03-14 DIAGNOSIS — Z96.659 PRESENCE OF UNSPECIFIED ARTIFICIAL KNEE JOINT: Chronic | ICD-10-CM

## 2022-03-14 DIAGNOSIS — Z98.890 OTHER SPECIFIED POSTPROCEDURAL STATES: Chronic | ICD-10-CM

## 2022-03-14 DIAGNOSIS — G56.01 CARPAL TUNNEL SYNDROME, RIGHT UPPER LIMB: ICD-10-CM

## 2022-03-15 ENCOUNTER — APPOINTMENT (OUTPATIENT)
Dept: PULMONOLOGY | Facility: CLINIC | Age: 62
End: 2022-03-15
Payer: COMMERCIAL

## 2022-03-15 VITALS — WEIGHT: 145 LBS | BODY MASS INDEX: 26.68 KG/M2 | HEIGHT: 62 IN

## 2022-03-15 PROCEDURE — 99214 OFFICE O/P EST MOD 30 MIN: CPT | Mod: 95

## 2022-03-15 RX ORDER — FAMOTIDINE 40 MG/1
40 TABLET, FILM COATED ORAL
Qty: 90 | Refills: 1 | Status: ACTIVE | COMMUNITY
Start: 2022-03-15 | End: 1900-01-01

## 2022-03-15 NOTE — HISTORY OF PRESENT ILLNESS
[Home] : at home, [unfilled] , at the time of the visit. [Medical Office: ([facility])___] : at the medical office located in  [Verbal consent obtained from patient] : the patient, [unfilled] [FreeTextEntry1] : Ms. Dickerson is a 60 year old female with a history of allergic rhinitis, asthma, atypical chest pain, GERD, CRYSTAL, and SOB presenting to the office today via video call for a follow up visit. His chief complaint is\par -s/p bilateral carpal tunnel release\par -s/p knee replacement\par -she notes she recently got a dental device for CRYSTAL but it needs to be adjusted because it is causing her pain\par -she notes gaining 20 lbs\par -s/p COVID-19 12/2021 a week and a half after knee surgery\par -s/p COVID-19 vaccine x3 \par -she notes she was never given the Game Ready Ice Machine\par -she notes her sinuses are quiet right now\par \par -patient denies any headaches, nausea, vomiting, fever, chills, sweats, chest pain, chest pressure, palpitations, coughing, wheezing, fatigue, diarrhea, constipation, dysphagia, myalgias, dizziness, leg swelling, leg pain, itchy eyes, itchy ears, heartburn, reflux or sour taste in the mouth

## 2022-03-15 NOTE — ASSESSMENT
[FreeTextEntry1] : Ms. Dickerson is a 61 year old female with vertigo, fibromyalgia, asthma, allergies, GERD, OSAS and mild SOB (non-compliant) presenting to the office today for a follow up pulmonary evaluation via video call. Her number one issue is orthopedic issues / healing  - knee, wrists, neuroma\par \par problem 1: asthma (controlled)\par -continue to use Levalbuterol 1.25% by the nebulizer \par -continue to use Accolate 20 mg BID \par -continue Trelegy 1 puff QD  \par -continue ProAir 2 puffs Q6H, pre-exercise\par -continue to use Ventolin PRN \par -Asthma is  believed to be caused by inherited (genetic) and environmental factor, but its exact cause is unknown. Asthma may be triggered by allergens, lung infections, or irritants in the air. Asthma triggers are different for each person\par -Inhaler technique reviewed as well as oral hygiene techniques reviewed with patient. Avoidance of cold air, extremes of temperature, rescue inhaler should be used before exercise. Order of medication reviewed with patient. Recommended use of a cool mist humidifier in the bedroom.\par \par problem 2: allergic rhinitis - controlled\par -continue Xhance 1 sniff BID \par -continue to use nasal saline/Xlear; Navage nasal spray\par -can continue OTC antihistamine PRN \par -continue to use Astelin (.15) 1 sniff/nostril BID \par \par -Environmental measures for allergies were encouraged including mattress and pillow cover, air purifier, and environmental controls. \par \par problem 3: acute/chronic sinusitis -Nasal Polyps \par -recommended CT of sinuses w/o contrast\par -Consider Dupixent. \par \par problem 4: CRYSTAL\par -Dental Device ([place])\par -recommended positional sleep \par -recommended to use "Chin Strap" \par -recommended to use extended release melatonin \par -Sleep apnea is associated with adverse clinical consequences which an affect most organ systems.  Cardiovascular disease risk includes arrhythmias, atrial fibrillation, hypertension, coronary artery disease, and stroke. Metabolic disorders include diabetes type 2, non-alcoholic fatty liver disease. Mood disorder especially depression; and cognitive decline especially in the elderly. Associations with  chronic reflux/Brothers’s esophagus some but not all inclusive. \par -Reasons to assess this include arousal consistent with hypopnea; respiratory events most prominent in REM sleep or supine position; therefore sleep staging and body position are important for accurate diagnosis and estimation of AHI.\par -Treatment options discussed including CPAP/BiPAP machine, oral appliance, ProVent therapy, Oxy-Aid by Respitec, new technologies, or positional sleep.Recommended use of the CPAP machine for moderate (AHI >15), moderate to severe (AHI 15-30) and severe patients (AHI > 30). Recommended weight loss which can reduce AHI especially in weight loss of greater than 5% of BMI. Positional sleep is recommended in those with low AHI, low-moderate BMI, and younger age. For severe sleep apnea, the hypoglossal nerve stimulator was recommended as well.\par \par problem 5: GERD ([place]) \par -continue AcipHex 20 mg before breakfast\par \par -Rule of 2s: avoid eating too much, eating too late, eating too spicy, eating two hours before bed\par -Things to avoid including overeating, spicy foods, tight clothing, eating within three hours of bed, this list is not all inclusive. \par -For treatment of reflux, possible options discussed including diet control, H2 blockers, PPIs, as well as coating motility agents discussed as treatment options. Timing of meals and proximity of last meal to sleep were discussed. If symptoms persist, a formal gastrointestinal evaluation is needed.\par \par problem 6: atypical chest pain (resolved) \par -felt to be related to reflux and anxiety \par \par problem 7: anxiety \par -recommended to read: "The Gift of Maybe," by Rebeca Aguilar \par \par problem 8: overweight\par -recommended to try WW. \par Weight loss, exercise, and diet control were discussed and are highly encouraged. Treatment options were given such as, aqua therapy, and contacting a nutritionist. Recommended to use the elliptical, stationary bike, less use of treadmill. Mindful eating was explained to the patient Obesity is associated with worsening asthma, shortness of breath, and potential for cardiac disease, diabetes, and other underlying medical conditions. \par \par \par Problem 9: Health Maintenance/COVID19 Precautions:\par -s/p COVID-19 vaccine x3 12/2021\par - Clean your hands often. Wash your hands often with soap and water for at least 20 seconds, especially after blowing your nose, coughing, or sneezing, or having been in a public place.\par - If soap and water are not available, use a hand  that contains at least 60% alcohol.\par - To the extent possible, avoid touching high-touch surfaces in public places - elevator buttons, door handles, handrails, handshaking with people, etc. Use a tissue or your sleeve to cover your hand or finger if you must touch something.\par - Wash your hands after touching surfaces in public places.\par - Avoid touching your face, nose, eyes, etc.\par - Clean and disinfect your home to remove germs: practice routine cleaning of frequently touched surfaces (for example: tables, doorknobs, light switches, handles, desks, toilets, faucets, sinks & cell phones)\par - Avoid crowds, especially in poorly ventilated spaces. Your risk of exposure to respiratory viruses like COVID-19 may increase in crowded, closed-in settings with little air circulation if\par there are people in the crowd who are sick. All patients are recommended to practice social distancing and stay at least 6 feet away from others.\par - Avoid all non-essential travel including plane trips, and especially avoid embarking on cruise ships.\par -If COVID-19 is spreading in your community, take extra measures to put distance between yourself and other people to further reduce your risk of being exposed to this new virus.\par -Stay home as much as possible.\par - Consider ways of getting food brought to your house through family, social, or commercial networks\par -Be aware that the virus has been known to live in the air up to 3 hours post exposure, cardboard up to 24 hours post exposure, copper up to 4 hours post exposure, steel and plastic up to 2-3 days post exposure. Risk of transmission from these surfaces are affected by many variables.\par \par Immune Support Recommendations:\par -OTC Vitamin C 500mg BID \par -OTC Quercetin 250-500mg BID \par -OTC Zinc 75-100mg per day \par -OTC Melatonin 1or 2mg a night \par -OTC Vitamin D 1-4000mg per day\par -Tonic Water 8oz\par -Recommended to Stay Hydrated (At least a gallon/day)\par \par Asthma and COVID19:\par You need to make sure your asthma is under control. This often requires the use of inhaled corticosteroids (and sometimes oral corticosteroids). Inhaled corticosteroids do not likely reduce your immune system’s ability to fight infections, but oral corticosteroids may. It is important to use the steps above to protect yourself to limit your exposure to any respiratory virus. \par \par problem 10: health maintenance \par -s/p yearly flu shot 9/3/2020\par -recommended strep pneumonia vaccines: Prevnar-13 vaccine, followed by Pneumo vaccine 23 one year following\par -recommended early intervention for URIs\par -recommended regular osteoporosis evaluations\par -recommended early dermatological evaluations\par -recommended after the age of 50 to the age of 70, colonoscopy every 5 years \par \par F/U in 6 months SPI and NIOX.\par She is encouraged to call with any changes, concerns, or questions.

## 2022-03-15 NOTE — ADDENDUM
[FreeTextEntry1] : Documented by Rolando oJ acting as a scribe for Dr. Mathew Rao on 03/15/2022\par \par All medical record entries made by the scribe were at my, Dr. Mathew Rao's, direction and personally dictated by me on 03/15/2022. I have reviewed the chart and agree that the record accurately reflects my personal performance of the history, physical exam, assessment, and plan. I have also personally directed, reviewed, and agree with the discharge instructions.

## 2022-03-15 NOTE — REASON FOR VISIT
[Follow-Up] : a follow-up visit [FreeTextEntry1] : video call - allergic rhinitis, asthma, atypical chest pain, GERD, CRYSTAL, and SOB

## 2022-04-04 ENCOUNTER — TRANSCRIPTION ENCOUNTER (OUTPATIENT)
Age: 62
End: 2022-04-04

## 2022-04-04 ENCOUNTER — APPOINTMENT (OUTPATIENT)
Dept: PAIN MANAGEMENT | Facility: CLINIC | Age: 62
End: 2022-04-04
Payer: COMMERCIAL

## 2022-04-04 PROCEDURE — 64483 NJX AA&/STRD TFRM EPI L/S 1: CPT | Mod: RT

## 2022-04-04 PROCEDURE — 64484 NJX AA&/STRD TFRM EPI L/S EA: CPT | Mod: 59,RT

## 2022-04-04 NOTE — PROCEDURE
[FreeTextEntry3] : Date of Service: 04/04/2022 \par \par Account: [id number]\par \par Patient: RASHARD GALLEGOS \par \par YOB: 1960\par \par Age: 61 year\par \par \par Surgeon: Genoveva Monroe M.D.\par \par Assistant: None.\par \par Pre-Operative Diagnosis: Lumbosacral Radiculitis (M54.17)\par \par Post Operative Diagnosis: Lumbosacral Radiculitis (M54.17)\par \par Procedure: Right L4-5, L5-S1 transforaminal epidural steroid injection under fluoroscopic guidance.\par \par Anesthesia:             MAC\par \par \par This procedure was carried out using fluoroscopic guidance.  The risks and benefits of the procedure were discussed extensively with the patient.  The consent of the patient was obtained and the following procedure was performed. The patient was placed in the prone position on the fluoroscopic table and the lumbar area was prepped and draped in a sterile fashion.\par \par The right L4-5 and L5-S1 neural foramen were identified on right oblique  "[place] dog" anatomical view at the 6 o' clock position using fluoroscopic guidance, and the area was marked. The overlying skin and subcutaneous structures were anesthetized using sterile technique with 1% Lidocaine.  A 22 gauge spinal needle was directed toward the inferior (6o'clock) position of the pedicle, which formed the roof of the identified foramen.  Once in the epidural space, after negative aspiration for heme and CSF, 1cc of Omnipaque contrast was injected to confirm epidural location and assess filling defects and rule out intravascular needle placement. \par \par The following contrast flow and epidurogram was observed: no intravascular or intrathecal flow pattern was noted.  No blood or CSF was aspirated. Omnipaque spread appeared to outline the right L4 and L5 nerve roots and spread medially into the epidural space.  \par \par After this, an injectate of 3 cc preservative free normal saline plus 40 mg of Kenalog was injected in the epidural space at each of the two levels.\par \par The needle was subsequently removed.  Vital signs remained normal.  Pulse oximeter was used throughout the procedure and the patient's pulse and oxygen saturation remained within normal limits.  The patient tolerated the procedure well.  There were no complications.  The patient was instructed to apply ice over the injection sites for twenty minutes every two hours for the next 24 to 48 hours.  The patient was also instructed to contact me immediately if there were any problems.\par \srinath Monroe M.D.\par \par \par

## 2022-04-07 ENCOUNTER — NON-APPOINTMENT (OUTPATIENT)
Age: 62
End: 2022-04-07

## 2022-04-08 ENCOUNTER — APPOINTMENT (OUTPATIENT)
Dept: PAIN MANAGEMENT | Facility: CLINIC | Age: 62
End: 2022-04-08
Payer: COMMERCIAL

## 2022-04-08 PROCEDURE — J3490M: CUSTOM

## 2022-04-08 PROCEDURE — 64633 DESTROY CERV/THOR FACET JNT: CPT | Mod: RT

## 2022-04-08 PROCEDURE — 64634Z: CUSTOM | Mod: 59,RT

## 2022-04-08 NOTE — PROCEDURE
[FreeTextEntry3] : Date of Service: 04/08/2022 \par \par Account: [id number]\par \par Patient: RASHARD GALLEGOS \par \par YOB: 1960\par \par Age: 61 year\par \par \par RIGHT RFA CERVICAL UPPER FACET\par \par Surgeon: Genoveva Monroe M.D.\par \par Assistant: None.\par \par Pre-Operative Diagnosis: \par \par Post Operative Diagnosis: \par \par Procedure: Right Third Occipital Nerve, C3, C4, C5 Medial Branch Radiofrequency Neurolysis under fluoroscopic guidance.               \par \par Anesthesia:             MAC\par \par \par This procedure was carried out using fluoroscopic guidance.  The risks and benefits of the procedure were discussed extensively with the patient.  The consent of the patient was obtained and the following procedure was performed.\par \par The patient was placed in the prone position.  The patient's neck was prepped and draped in a sterile fashion.  The C2,C3,C4,C5 vertebral levels were identified and the fluoroscope right obliqued to approximately 10 degrees to reveal the "waste" of the left articular pillars at the C2,C3,C4,C5 levels and these were marked.  The skin at these target points was then localized using 1 cc of 1% Lidocaine without epinephrine at each injection site.  A 20 Gauge 100mm debbie needle with a 5mm active curved tip was then introduced and advanced at these four levels to the above target points, and under lateral view the needle tip was advanced a few millimeters and confirmed at the middle of the classical "trapezius" structure on oss. \par \par The stylette for the most cephalad needle at the C2 level was then removed and a Harrison radiofrequency probe was then placed inside the needle. After impedences were checked at approximately 240 ohm, 50 hertz sensory stimulation was performed.  Patient experienced concordant pain in their right neck at approximately 0.6 volts. The voltage was then increased to 1 volt. The patient reported increased right neck pain symptoms without any radiation below the left shoulder.  Stimulation was then changed to 2 hertz and increased slowly by 0.1 volt increments at approximately 0.8 volts, patient began to experience thumping like reproductive pain in their right neck. The voltage was then increased to approximately 2.5 volts. Patient experienced increasing thumping without any sensation below his right shoulder. This exact stimulation was then repeated for the C3,C4,C5 needles reproductive right sided neck pain and no radiation below the right shoulder. The 4 levels were then anesthetized with approximately 0.5 cc's of 1% Lidocaine. After which each area was then ablated at 80 degrees centigrade for 60 seconds each. Patient felt no pain reproduction during the ablation procedure.  After each of these levels were ablated, approximately 0.5 cc of 0.25% Bupivacaine plus 12 mg of Celestone were then injected before the needles were removed.  Pressure was then applied to the neck.  Band-aids were applied.  Patient was brought to the recovery, ambulated on their own after the procedure and reported decreased left sided neck pain. \par \par Anesthesia personnel were present throughout the procedure. Patient was told to apply ice to the neck for 20 minutes on and 20 minutes off for 24-48 hours. Patient is to call the office if they had any questions or concerns.   The patient was also instructed to contact me immediately if there were any problems.\par \par Genoveva Monroe M.D.\par \par

## 2022-04-29 ENCOUNTER — APPOINTMENT (OUTPATIENT)
Dept: PAIN MANAGEMENT | Facility: CLINIC | Age: 62
End: 2022-04-29

## 2022-05-20 ENCOUNTER — APPOINTMENT (OUTPATIENT)
Dept: PAIN MANAGEMENT | Facility: CLINIC | Age: 62
End: 2022-05-20

## 2022-05-20 ENCOUNTER — RX RENEWAL (OUTPATIENT)
Age: 62
End: 2022-05-20

## 2022-06-20 ENCOUNTER — RX RENEWAL (OUTPATIENT)
Age: 62
End: 2022-06-20

## 2022-06-26 ENCOUNTER — NON-APPOINTMENT (OUTPATIENT)
Age: 62
End: 2022-06-26

## 2022-06-28 ENCOUNTER — RESULT REVIEW (OUTPATIENT)
Age: 62
End: 2022-06-28

## 2022-06-30 ENCOUNTER — APPOINTMENT (OUTPATIENT)
Age: 62
End: 2022-06-30

## 2022-06-30 PROCEDURE — J3490M: CUSTOM

## 2022-06-30 PROCEDURE — 64493 INJ PARAVERT F JNT L/S 1 LEV: CPT | Mod: LT

## 2022-06-30 PROCEDURE — 64494 INJ PARAVERT F JNT L/S 2 LEV: CPT | Mod: 59,LT

## 2022-06-30 PROCEDURE — 64495 INJ PARAVERT F JNT L/S 3 LEV: CPT | Mod: 59,LT

## 2022-07-15 ENCOUNTER — APPOINTMENT (OUTPATIENT)
Dept: PAIN MANAGEMENT | Facility: CLINIC | Age: 62
End: 2022-07-15

## 2022-07-15 VITALS — BODY MASS INDEX: 27.42 KG/M2 | HEIGHT: 62 IN | WEIGHT: 149 LBS

## 2022-07-15 PROCEDURE — 99214 OFFICE O/P EST MOD 30 MIN: CPT

## 2022-07-15 NOTE — ASSESSMENT
[FreeTextEntry1] : After discussing various treatment options with the patient including but not limited to oral medications, physical therapy, exercise, modalities as well as interventional spinal injections, we have decided with the following plan:\par \par 1) Intervention Injection Therapy:\par I personally reviewed the MRI/CT scan images and agree with the radiologist's report. The radiological findings were discussed with the patient.\par The risks, benefits, contents and alternatives to injection were explained in full to the patient. Risks outlined include but are not limited to infection,sepsis, bleeding, post-dural puncture headache, nerve damage, temporary increase in pain, syncopal episode, failure to resolve symptoms, allergic reaction, symptom recurrence, and elevation of blood sugar in diabetics. Cortisone may cause immunosuppression. Patient understands the risks. All questions were answered. After discussion of options, patient requested an injection. Information regarding the injection was given to the patient. Which medications to stop prior to the injection was explained to the patient as well.\par \par Follow up in 1-2 weeks post injection for re-evaluation. \par Continue Home exercises, stretching, activity modification, physical therapy, and conservative care.\par \par Patient has cervical axial pain that is consistent with facet joint pathology.  A diagnostic temporary block with local anesthetic  of the medial branch was performed and has resulted in at least a 80% reduction in pain for the duration of the specific local anesthetic effect.  The pain is not radicular and there is absence of nerve root compression.  There is no prior spinal fusion surgery at the level targeted.  The pain has failed to respond to three months of conservative therapy.\par \par left cervical RFA\par \par 2) I would recommend a trial of neuropathic medication as patient presents with signs of nerve irritation. (ie burning, paresthesias etc) Goals of therapy would be to improve pain and overall QOL. Side effects reviewed with patient. Patient will call or stop medication if given side effects occur.

## 2022-07-15 NOTE — HISTORY OF PRESENT ILLNESS
[Lower back] : lower back [5] : 5 [0] : 0 [Throbbing] : throbbing [] : yes [Rest] : rest [Meds] : meds [Standing] : standing [Bending forward] : bending forward [FreeTextEntry1] : 7/15/22-  L LS MBB without relief;  c/o pain in lower back, mainly L sided with cramping in L ankle ; consider caudal michael prior L upper cerv RFA with >70% relief sustained several months with improved rom and adls, will repeat. Has been on Topamax in the past with good relief. \par \par 3/25/22: follow up today to MRI lumbar spine: (3/17/22) Impression:\par 1. Mild central stenosis with bilateral exiting L3 nerve root impingement at L3-L4, scoliosis, and postoperative changes\par without recurrent disc herniation at L4-L5.\par 2. Multilevel degenerative disc disease and posterior protrusion at T11-T12 with hemangioma at L1 and multilevel\par foraminal narrowing.\par 3. No acute fracture, discitis, or enhancing postoperative fluid collection on postcontrast imaging.\par 4. Clinical correlation regarding prior surgical history is recommended.\par has pain in the right foot when she puts weight on it. Thought initially it was a neuroma. however ruled out with\par imaging.\par \par Had fusion L4/5 in September 2020. I would recommend EMG/NCV>\par She would like to repeat cervical RFA as well.\par \par 3/9/22: follow up toady. Pain in the left trap. yesterday she bent down and she felt a pop. now when she bends she feels like her back is going to give out. difficulty with change of position. Pain in the left lower back. Pain radiates down the posterior left leg.\par \par 8/13/21: follow up today after left SIJ injection on 7/23/21. She reports great relief initially and pain recurred about 3-4 days later. takes flexeril PRN (makes her sleepy) Pain under the left shoulder blade. Had CHERYL's in the past with relief. Consider left SIJ RFA.\par \par 7/23/21: follow up today after right cervical RFA on 7/8/21. patient starting to feel better. Since last visit had CT scan and met with Dr. Hunter Chow who recommended a Left SIJ injection.\par \par 2/26/21: follow up today after right cervical RFA on 11/13/21. Had 60% improvement but now the left side of neck is hurting again. MRI 10/20- S/P L4-L5 laminectomy . Hardware intact\par Will give TPI today and schedule repeat RFA on left. SCS info given\par \par 8.14.20: follow up today after left cervical RFA. Pt with some improvement. I would recommend she give it another 4 weeks. I would recommend right cervical RFA as it has helped in the past.\par \par 7.3.20 - follow up today after left L5/S1 and S1 and CHERYL. The neck improved about 50%.Has left lower back pain. sitting aggravated her left lower back. numbness in the legs. last left lumbar RFA was in September. [FreeTextEntry6] : cramping  [FreeTextEntry7] : Ankle area

## 2022-07-15 NOTE — PHYSICAL EXAM
[Rotation to left] : rotation to left [Rotation to right] : rotation to right [] : no palpable masses [de-identified] : left lateral rotation 75 degrees [TWNoteComboBox6] : right lateral rotation 75 degrees

## 2022-07-18 ENCOUNTER — RX RENEWAL (OUTPATIENT)
Age: 62
End: 2022-07-18

## 2022-07-19 ENCOUNTER — NON-APPOINTMENT (OUTPATIENT)
Age: 62
End: 2022-07-19

## 2022-07-22 ENCOUNTER — APPOINTMENT (OUTPATIENT)
Dept: PAIN MANAGEMENT | Facility: CLINIC | Age: 62
End: 2022-07-22

## 2022-07-22 PROCEDURE — J3490M: CUSTOM

## 2022-07-22 PROCEDURE — 64634Z: CUSTOM

## 2022-07-22 PROCEDURE — 64633 DESTROY CERV/THOR FACET JNT: CPT | Mod: LT

## 2022-07-22 NOTE — PROCEDURE
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  The risks and benefits of the procedure were discussed extensively with the patient.  The consent of the patient was obtained and the following procedure was performed.\par \par The patient was placed in the prone position.  The patient's neck was prepped and draped in a sterile fashion.  The C2,C3,C4,C5 vertebral levels were identified and the fluoroscope left obliqued to approximately 10 degrees to reveal the "waste" of the left articular pillars at the C2,C3,C4,C5 levels and these were marked.  The skin at these target points was then localized using 1 cc of 1% Lidocaine without epinephrine at each injection site.  A 20 Gauge 100mm tracie needle with a 5mm active curved tip was then introduced and advanced at these three levels to the above target points, and under lateral view the needle tip was advanced a few millimeters and confirmed at the middle of the classical "trapezius" structure on oss. \par \par The stylette for the most cephalad needle at the C2 level was then removed and a Tracie radiofrequency probe was then placed inside the needle. After impedences were checked at approximately 240 ohm, 50 hertz sensory stimulation was performed.  Patient experienced concordant pain in their left neck at approximately 0.6 volts. The voltage was then increased to 1 volt. The patient reported increased left neck pain symptoms without any radiation below the left shoulder.  Stimulation was then changed to 2 hertz and increased slowly by 0.1 volt increments at approximately 0.8 volts, patient began to experience thumping like reproductive pain in their left neck. The voltage was then increased to approximately 2.5 volts. Patient experienced increasing thumping without any sensation below their left shoulder. This exact stimulation was then repeated for the C3,C4,C5 needles reproductive left sided neck pain and no radiation below the left shoulder. The 3 levels were then anesthetized with approximately 0.5 cc's of 1% Lidocaine. After which each area was then ablated at 80 degrees centigrade for 60 seconds each. Patient felt no pain reproduction during the ablation procedure.  After each of these levels were ablated, approximately 0.5 cc of 0.25% Bupivacaine plus 12mg of Celestone were then injected before the needles were removed.  Pressure was then applied to the neck.  Band-aids were applied.  Patient was brought to the recovery, ambulated on their own after the procedure and reported decreased left sided neck pain. \par \par Anesthesia personnel were present throughout the procedure. Patient was told to apply ice to the neck for 20 minutes on and 20 minutes off for 24-48 hours. Patient is to call the office, if they had any questions or concerns.   The patient was also instructed to contact me immediately if there were any problems.\par \par Genoveva Monroe M.D.\par

## 2022-08-03 ENCOUNTER — RX RENEWAL (OUTPATIENT)
Age: 62
End: 2022-08-03

## 2022-08-05 ENCOUNTER — OUTPATIENT (OUTPATIENT)
Dept: OUTPATIENT SERVICES | Facility: HOSPITAL | Age: 62
LOS: 1 days | End: 2022-08-05
Payer: COMMERCIAL

## 2022-08-05 ENCOUNTER — APPOINTMENT (OUTPATIENT)
Dept: MAMMOGRAPHY | Facility: IMAGING CENTER | Age: 62
End: 2022-08-05

## 2022-08-05 ENCOUNTER — APPOINTMENT (OUTPATIENT)
Dept: ULTRASOUND IMAGING | Facility: IMAGING CENTER | Age: 62
End: 2022-08-05

## 2022-08-05 DIAGNOSIS — Z00.8 ENCOUNTER FOR OTHER GENERAL EXAMINATION: ICD-10-CM

## 2022-08-05 DIAGNOSIS — Z98.890 OTHER SPECIFIED POSTPROCEDURAL STATES: Chronic | ICD-10-CM

## 2022-08-05 DIAGNOSIS — Z96.659 PRESENCE OF UNSPECIFIED ARTIFICIAL KNEE JOINT: Chronic | ICD-10-CM

## 2022-08-05 PROCEDURE — 77063 BREAST TOMOSYNTHESIS BI: CPT | Mod: 26

## 2022-08-05 PROCEDURE — 76641 ULTRASOUND BREAST COMPLETE: CPT | Mod: 26,50

## 2022-08-05 PROCEDURE — 77067 SCR MAMMO BI INCL CAD: CPT

## 2022-08-05 PROCEDURE — 76641 ULTRASOUND BREAST COMPLETE: CPT

## 2022-08-05 PROCEDURE — 77067 SCR MAMMO BI INCL CAD: CPT | Mod: 26

## 2022-08-05 PROCEDURE — 77063 BREAST TOMOSYNTHESIS BI: CPT

## 2022-08-12 ENCOUNTER — APPOINTMENT (OUTPATIENT)
Dept: PAIN MANAGEMENT | Facility: CLINIC | Age: 62
End: 2022-08-12

## 2022-08-12 NOTE — HISTORY OF PRESENT ILLNESS
[Lower back] : lower back [5] : 5 [0] : 0 [Throbbing] : throbbing [] : yes [Rest] : rest [Meds] : meds [Bending forward] : bending forward [Standing] : standing [FreeTextEntry1] : 08/12/2022: follow up today after left cervical RFA on 7/22/22. \par \par 7/15/22-  L LS MBB without relief;  c/o pain in lower back, mainly L sided with cramping in L ankle ; consider caudal michael prior L upper cerv RFA with >70% relief sustained several months with improved rom and adls, will repeat. Has been on Topamax in the past with good relief. \par \par 3/25/22: follow up today to MRI lumbar spine: (3/17/22) Impression:\par 1. Mild central stenosis with bilateral exiting L3 nerve root impingement at L3-L4, scoliosis, and postoperative changes\par without recurrent disc herniation at L4-L5.\par 2. Multilevel degenerative disc disease and posterior protrusion at T11-T12 with hemangioma at L1 and multilevel\par foraminal narrowing.\par 3. No acute fracture, discitis, or enhancing postoperative fluid collection on postcontrast imaging.\par 4. Clinical correlation regarding prior surgical history is recommended.\par has pain in the right foot when she puts weight on it. Thought initially it was a neuroma. however ruled out with\par imaging.\par \par Had fusion L4/5 in September 2020. I would recommend EMG/NCV>\par She would like to repeat cervical RFA as well.\par \par 3/9/22: follow up toady. Pain in the left trap. yesterday she bent down and she felt a pop. now when she bends she feels like her back is going to give out. difficulty with change of position. Pain in the left lower back. Pain radiates down the posterior left leg.\par \par 8/13/21: follow up today after left SIJ injection on 7/23/21. She reports great relief initially and pain recurred about 3-4 days later. takes flexeril PRN (makes her sleepy) Pain under the left shoulder blade. Had CHERYL's in the past with relief. Consider left SIJ RFA.\par \par 7/23/21: follow up today after right cervical RFA on 7/8/21. patient starting to feel better. Since last visit had CT scan and met with Dr. Hunter Chow who recommended a Left SIJ injection.\par \par 2/26/21: follow up today after right cervical RFA on 11/13/21. Had 60% improvement but now the left side of neck is hurting again. MRI 10/20- S/P L4-L5 laminectomy . Hardware intact\par Will give TPI today and schedule repeat RFA on left. SCS info given\par \par 8.14.20: follow up today after left cervical RFA. Pt with some improvement. I would recommend she give it another 4 weeks. I would recommend right cervical RFA as it has helped in the past.\par \par 7.3.20 - follow up today after left L5/S1 and S1 and CHERYL. The neck improved about 50%.Has left lower back pain. sitting aggravated her left lower back. numbness in the legs. last left lumbar RFA was in September. [FreeTextEntry7] : Ankle area  [FreeTextEntry6] : cramping

## 2022-08-12 NOTE — PHYSICAL EXAM
[Rotation to left] : rotation to left [Rotation to right] : rotation to right [] : no palpable masses [de-identified] : left lateral rotation 75 degrees [TWNoteComboBox6] : right lateral rotation 75 degrees

## 2022-08-17 ENCOUNTER — NON-APPOINTMENT (OUTPATIENT)
Age: 62
End: 2022-08-17

## 2022-08-19 ENCOUNTER — APPOINTMENT (OUTPATIENT)
Dept: PAIN MANAGEMENT | Facility: CLINIC | Age: 62
End: 2022-08-19

## 2022-08-19 VITALS — WEIGHT: 145 LBS | BODY MASS INDEX: 26.68 KG/M2 | HEIGHT: 62 IN

## 2022-08-19 PROCEDURE — J3490M: CUSTOM

## 2022-08-19 PROCEDURE — 99214 OFFICE O/P EST MOD 30 MIN: CPT | Mod: 25

## 2022-08-19 PROCEDURE — 20553 NJX 1/MLT TRIGGER POINTS 3/>: CPT

## 2022-08-19 NOTE — PHYSICAL EXAM
[Rotation to left] : rotation to left [Rotation to right] : rotation to right [] : no palpable masses [de-identified] : left lateral rotation 75 degrees [TWNoteComboBox6] : right lateral rotation 75 degrees

## 2022-08-19 NOTE — PROCEDURE
[Left] : of the left [Cervical paraspinal muscle] : cervical paraspinal muscle [Thoracic paraspinal muscle] : thoracic paraspinal muscle

## 2022-08-19 NOTE — HISTORY OF PRESENT ILLNESS
[Lower back] : lower back [Throbbing] : throbbing [Rest] : rest [Standing] : standing [Bending forward] : bending forward [Neck] : neck [8] : 8 [5] : 5 [Intermittent] : intermittent [Household chores] : household chores [Work] : work [Sleep] : sleep [Meds] : meds [Ice] : ice [Heat] : heat [Walking] : walking [Exercising] : exercising [Retired] : Work status: retired [FreeTextEntry1] : 08/18/2022: follow up today after left cervical RFA on 7/22/22.  Had 50% so far.  Has not had as much improvement as in past.  Also complains of pain in thoracic area.  Will give TPI\par \par 7/15/22-  L LS MBB without relief;  c/o pain in lower back, mainly L sided with cramping in L ankle ; consider caudal michael prior L upper cerv RFA with >70% relief sustained several months with improved rom and adls, will repeat. Has been on Topamax in the past with good relief. \par \par 3/25/22: follow up today to MRI lumbar spine: (3/17/22) Impression:\par 1. Mild central stenosis with bilateral exiting L3 nerve root impingement at L3-L4, scoliosis, and postoperative changes\par without recurrent disc herniation at L4-L5.\par 2. Multilevel degenerative disc disease and posterior protrusion at T11-T12 with hemangioma at L1 and multilevel\par foraminal narrowing.\par 3. No acute fracture, discitis, or enhancing postoperative fluid collection on postcontrast imaging.\par 4. Clinical correlation regarding prior surgical history is recommended.\par has pain in the right foot when she puts weight on it. Thought initially it was a neuroma. however ruled out with\par imaging.\par \par Had fusion L4/5 in September 2020. I would recommend EMG/NCV>\par She would like to repeat cervical RFA as well.\par \par 3/9/22: follow up toady. Pain in the left trap. yesterday she bent down and she felt a pop. now when she bends she feels like her back is going to give out. difficulty with change of position. Pain in the left lower back. Pain radiates down the posterior left leg.\par \par 8/13/21: follow up today after left SIJ injection on 7/23/21. She reports great relief initially and pain recurred about 3-4 days later. takes flexeril PRN (makes her sleepy) Pain under the left shoulder blade. Had CHERYL's in the past with relief. Consider left SIJ RFA.\par \par 7/23/21: follow up today after right cervical RFA on 7/8/21. patient starting to feel better. Since last visit had CT scan and met with Dr. Hunter Chow who recommended a Left SIJ injection.\par \par 2/26/21: follow up today after right cervical RFA on 11/13/21. Had 60% improvement but now the left side of neck is hurting again. MRI 10/20- S/P L4-L5 laminectomy . Hardware intact\par Will give TPI today and schedule repeat RFA on left. SCS info given\par \par 8.14.20: follow up today after left cervical RFA. Pt with some improvement. I would recommend she give it another 4 weeks. I would recommend right cervical RFA as it has helped in the past.\par \par 7.3.20 - follow up today after left L5/S1 and S1 and CHERYL. The neck improved about 50%.Has left lower back pain. sitting aggravated her left lower back. numbness in the legs. last left lumbar RFA was in September. [] : no [FreeTextEntry6] : cramping  [FreeTextEntry7] : to the left side [de-identified] : Driving [TWNoteComboBox1] : 50%

## 2022-08-24 ENCOUNTER — RX RENEWAL (OUTPATIENT)
Age: 62
End: 2022-08-24

## 2022-08-28 ENCOUNTER — NON-APPOINTMENT (OUTPATIENT)
Age: 62
End: 2022-08-28

## 2022-08-30 ENCOUNTER — TRANSCRIPTION ENCOUNTER (OUTPATIENT)
Age: 62
End: 2022-08-30

## 2022-08-30 ENCOUNTER — APPOINTMENT (OUTPATIENT)
Dept: PULMONOLOGY | Facility: CLINIC | Age: 62
End: 2022-08-30

## 2022-08-30 PROCEDURE — 99213 OFFICE O/P EST LOW 20 MIN: CPT | Mod: 95

## 2022-08-30 RX ORDER — LEVALBUTEROL HYDROCHLORIDE 0.63 MG/3ML
0.63 SOLUTION RESPIRATORY (INHALATION)
Qty: 1 | Refills: 2 | Status: ACTIVE | COMMUNITY
Start: 2017-12-13 | End: 1900-01-01

## 2022-08-31 ENCOUNTER — TRANSCRIPTION ENCOUNTER (OUTPATIENT)
Age: 62
End: 2022-08-31

## 2022-09-01 ENCOUNTER — TRANSCRIPTION ENCOUNTER (OUTPATIENT)
Age: 62
End: 2022-09-01

## 2022-09-01 RX ORDER — NEBULIZER ACCESSORIES
KIT MISCELLANEOUS
Qty: 1 | Refills: 0 | Status: ACTIVE | COMMUNITY
Start: 2022-09-01 | End: 1900-01-01

## 2022-09-06 ENCOUNTER — TRANSCRIPTION ENCOUNTER (OUTPATIENT)
Age: 62
End: 2022-09-06

## 2022-09-09 NOTE — HISTORY OF PRESENT ILLNESS
[Never] : never [TextBox_4] : Ms. Dickerson is a 61 year old female with a history of allergic rhinitis, asthma, atypical chest pain, GERD, CRYSTAL, and SOB who now presents via video call for COVID infection. \par \par His chief complaint is COVID infection.\par \par Patient reports she tested positive for COVID infection via home test yesterday. She states her symptoms are sore throat, fever (101.7), chest tightness, headache, SOB on exertion, productive cough with yellow phlegm, body aches fatigue and diarrhea. Patient is currently taking Trelegy inhaler, Accolate and Theraflu. Patent has nebulizer at home but hasn't use it yet.\par \par Patient denies SOB @ rest, wheezing, nasal congestion, runny nose or heartburn/reflux. \par \par \par \par

## 2022-09-09 NOTE — REVIEW OF SYSTEMS
[Fever] : fever [Fatigue] : fatigue [Cough] : cough [Chest Tightness] : chest tightness [Sputum] : sputum [SOB on Exertion] : sob on exertion [Negative] : Psychiatric [Recent Wt Gain (___ Lbs)] : ~T no recent weight gain [EDS] : no EDS [Chills] : no chills [Poor Appetite] : no poor appetite [Recent Wt Loss (___ Lbs)] : ~T no recent weight loss [Hemoptysis] : no hemoptysis [Frequent URIs] : no frequent URIs [Dyspnea] : no dyspnea [Pleuritic Pain] : no pleuritic pain [Wheezing] : no wheezing [A.M. Dry Mouth] : no a.m. dry mouth

## 2022-09-09 NOTE — ASSESSMENT
[FreeTextEntry1] : Ms. Dickerson is a 61 year old female with a history of allergic rhinitis, asthma, atypical chest pain, GERD, CRYSTAL, and SOB who now presents via video call for COVID infection. \par \par His chief complaint is COVID infection.\par \par 1. COVID infection\par -add Paxlovid BID X 5 days\par -advised patient stop statins for 7 days\par -advised patient quarantine for 5 days from symptom onset. \par \par 2. Asthma\par -add Levalbuterol via nebulizer Q6H \par -continue Trelegy 1 puff QD\par -continue Accolate 20 mg BID\par \par Patient to follow up with Dr. Rao as scheduled.\par Patient to call with further questions and concerns.\par Patient verbalizes understanding of care plan and is agreeable.\par

## 2022-09-09 NOTE — REASON FOR VISIT
[Home] : at home, [unfilled] , at the time of the visit. [Medical Office: ([facility])___] : at the medical office located in  [Patient] : the patient [Follow-Up] : a follow-up visit [Asthma] : asthma [Cough] : cough

## 2022-09-27 ENCOUNTER — RX RENEWAL (OUTPATIENT)
Age: 62
End: 2022-09-27

## 2022-09-29 ENCOUNTER — APPOINTMENT (OUTPATIENT)
Dept: OTOLARYNGOLOGY | Facility: CLINIC | Age: 62
End: 2022-09-29

## 2022-09-29 VITALS
HEART RATE: 92 BPM | DIASTOLIC BLOOD PRESSURE: 79 MMHG | SYSTOLIC BLOOD PRESSURE: 129 MMHG | HEIGHT: 62 IN | WEIGHT: 145 LBS | BODY MASS INDEX: 26.68 KG/M2

## 2022-09-29 PROCEDURE — 92625 TINNITUS ASSESSMENT: CPT

## 2022-09-29 PROCEDURE — 92557 COMPREHENSIVE HEARING TEST: CPT

## 2022-09-29 PROCEDURE — 92567 TYMPANOMETRY: CPT

## 2022-09-29 PROCEDURE — 31231 NASAL ENDOSCOPY DX: CPT

## 2022-09-29 PROCEDURE — 99203 OFFICE O/P NEW LOW 30 MIN: CPT | Mod: 25

## 2022-09-29 RX ORDER — AZITHROMYCIN 500 MG/1
500 TABLET, FILM COATED ORAL
Qty: 7 | Refills: 0 | Status: COMPLETED | COMMUNITY
Start: 2021-06-18 | End: 2022-09-29

## 2022-09-29 RX ORDER — CELECOXIB 100 MG/1
100 CAPSULE ORAL
Qty: 60 | Refills: 0 | Status: COMPLETED | COMMUNITY
Start: 2017-03-10 | End: 2022-09-29

## 2022-09-29 RX ORDER — ALBUTEROL SULFATE 90 UG/1
108 (90 BASE) INHALANT RESPIRATORY (INHALATION)
Qty: 3 | Refills: 1 | Status: COMPLETED | COMMUNITY
Start: 2020-06-10 | End: 2022-09-29

## 2022-09-29 RX ORDER — OLMESARTAN MEDOXOMIL / AMLODIPINE BESYLATE / HYDROCHLOROTHIAZIDE 40; 10; 25 MG/1; MG/1; MG/1
TABLET, FILM COATED ORAL
Refills: 0 | Status: ACTIVE | COMMUNITY

## 2022-09-29 RX ORDER — ATORVASTATIN CALCIUM 80 MG/1
TABLET, FILM COATED ORAL
Refills: 0 | Status: ACTIVE | COMMUNITY

## 2022-09-29 RX ORDER — AZELASTINE HYDROCHLORIDE 205.5 UG/1
0.15 SPRAY, METERED NASAL TWICE DAILY
Qty: 3 | Refills: 1 | Status: DISCONTINUED | COMMUNITY
Start: 2017-08-24 | End: 2022-09-29

## 2022-09-29 RX ORDER — LEVALBUTEROL HYDROCHLORIDE 0.63 MG/3ML
0.63 SOLUTION RESPIRATORY (INHALATION)
Qty: 120 | Refills: 3 | Status: COMPLETED | COMMUNITY
Start: 2020-01-21 | End: 2022-09-29

## 2022-09-29 RX ORDER — SULFAMETHOXAZOLE AND TRIMETHOPRIM 800; 160 MG/1; MG/1
800-160 TABLET ORAL TWICE DAILY
Qty: 28 | Refills: 0 | Status: COMPLETED | COMMUNITY
Start: 2020-01-21 | End: 2022-09-29

## 2022-09-29 RX ORDER — TOPIRAMATE 25 MG/1
25 TABLET, FILM COATED ORAL
Qty: 60 | Refills: 0 | Status: COMPLETED | COMMUNITY
Start: 2022-07-15 | End: 2022-09-29

## 2022-10-03 NOTE — ASSESSMENT
[FreeTextEntry1] : 61Y F with persistent PND/Nasal congestion and bilateral tinnitus 2/2 SNHL. \par \par Personally reviewed audiogram shows AU Normal to Moderate SNHL 250-8k hz. AU Tymp A. Tinnitus Match 8K @ 30db\par \par PND/Nasal Congestion\par -Discussed Post-nasal drip occurs when mucus from the nasal passages and sinuses drains into the throat. If the mucus becomes thick, post-nasal drip can cause problems such as a sore throat, laryngitis, a cough, bad breath, hoarseness, and sometimes wheezing.\par -Discussed the importance of rinsing the sinus before using nasal spray so that excess mucus lining the nasal cavity can be wash away so medication can penetration the nose.\par -Send to Pharmacy Flonase nasal spray\par -If Flonase spray is not effective can discuss additional other nasal sprays for treatment\par \par Tinnitus 2/2 SNHL\par -Discussed that Hearing Aids may help with relieving some of the tinnitus. Tinnitus usually follows the same pattern of hearing loss and can be attributed to phantom sounds in the brain filling in for the loss of hearing in those particular frequencies\par -Discussed that Tinnitus usually can be attributed to phantom sounds in the brain filling in for sounds. If the tinnitus is brief only last for a few seconds with no loss of hearing associated. It is usually physiological and can be management conservatively \par -Discussed notched therapy, masking therapy, cognitive behavioral therapy, factors that may influence tinnitus, and discussed limited benefit of tinnitus supplements.\par -Patient states will try white noise machine\par -Patient interested in Trial of HA for tinnitus. Clearance Hearing Aid Evaluation Given\par \par -Return to clinic in 3 months or sooner if new/worsen symptoms present\par

## 2022-10-03 NOTE — DATA REVIEWED
[de-identified] : An audiogram was ordered and performed including pure tones, tympanometry and speech testing for the patients complaint of hearing loss\par I have independently reviewed the patient's audiogram from today and my findings include \par AU Normal to Moderate SNHL 250-8k hz. AU Tymp A. Tinnitus Match 8K @ 30db

## 2022-10-03 NOTE — REASON FOR VISIT
[Initial Consultation] : an initial consultation for [FreeTextEntry2] : Referred by Dr. Rao for chronic sinuitis

## 2022-10-03 NOTE — HISTORY OF PRESENT ILLNESS
[de-identified] : 61 year old female referred by Dr. Rao history of deviated septum, allergic rhinitis, CRYSTAL, presents with chronic sinuitis\par S/p COVID 08/2022 and nasal polyps removal 1995\par COVID 08/2022- prescribed antiviral with relief.\par Reports right sided pressure and pain, post nasal drop and yellow nasal discharge, nasal congestion. \par Reports using saline rinses PRN with partial relief. \par Reports bilateral tinnitus for about 5-10 years. \par Reports having left jaw pain.\par Denies otalgia, otorrhea, ear infections, dizziness, vertigo, headaches related to hearing.  \par Denies dysphagia, odynophagia, dyspnea, and dysphonia\par Last CT sinus 01/31/2020 \par \par

## 2022-10-10 ENCOUNTER — TRANSCRIPTION ENCOUNTER (OUTPATIENT)
Age: 62
End: 2022-10-10

## 2022-10-24 ENCOUNTER — APPOINTMENT (OUTPATIENT)
Dept: PAIN MANAGEMENT | Facility: CLINIC | Age: 62
End: 2022-10-24

## 2022-10-24 PROCEDURE — 64634Z: CUSTOM

## 2022-10-24 PROCEDURE — J3490M: CUSTOM

## 2022-10-24 PROCEDURE — 64633 DESTROY CERV/THOR FACET JNT: CPT | Mod: RT

## 2022-10-24 NOTE — PROCEDURE
[FreeTextEntry3] : Date of Service: 10/24/2022 \par \par Account: [id number]\par \par Patient: RASHARD GALLEGOS \par \par YOB: 1960\par \par Age: 62 year\par \par \par RIGHT RFA CERVICAL UPPER FACET\par \par Surgeon: Genoveva Monroe M.D.\par \par Assistant: None.\par \par Pre-Operative Diagnosis: \par \par Post Operative Diagnosis: \par \par Procedure: Right Third Occipital Nerve, C3, C4, C5 Medial Branch Radiofrequency Neurolysis under fluoroscopic guidance.               \par \par Anesthesia:             MAC\par \par \par This procedure was carried out using fluoroscopic guidance.  The risks and benefits of the procedure were discussed extensively with the patient.  The consent of the patient was obtained and the following procedure was performed.\par \par The patient was placed in the prone position.  The patient's neck was prepped and draped in a sterile fashion.  The C2,C3,C4,C5 vertebral levels were identified and the fluoroscope right obliqued to approximately 10 degrees to reveal the "waste" of the left articular pillars at the C2,C3,C4,C5 levels and these were marked.  The skin at these target points was then localized using 1 cc of 1% Lidocaine without epinephrine at each injection site.  A 20 Gauge 100mm debbie needle with a 5mm active curved tip was then introduced and advanced at these four levels to the above target points, and under lateral view the needle tip was advanced a few millimeters and confirmed at the middle of the classical "trapezius" structure on oss. \par \par The stylette for the most cephalad needle at the C2 level was then removed and a Totz radiofrequency probe was then placed inside the needle. After impedences were checked at approximately 240 ohm, 50 hertz sensory stimulation was performed.  Patient experienced concordant pain in their right neck at approximately 0.6 volts. The voltage was then increased to 1 volt. The patient reported increased right neck pain symptoms without any radiation below the left shoulder.  Stimulation was then changed to 2 hertz and increased slowly by 0.1 volt increments at approximately 0.8 volts, patient began to experience thumping like reproductive pain in their right neck. The voltage was then increased to approximately 2.5 volts. Patient experienced increasing thumping without any sensation below his right shoulder. This exact stimulation was then repeated for the C3,C4,C5 needles reproductive right sided neck pain and no radiation below the right shoulder. The 4 levels were then anesthetized with approximately 0.5 cc's of 1% Lidocaine. After which each area was then ablated at 80 degrees centigrade for 60 seconds each. Patient felt no pain reproduction during the ablation procedure.  After each of these levels were ablated, approximately 0.5 cc of 0.25% Bupivacaine plus 12 mg of Celestone were then injected before the needles were removed.  Pressure was then applied to the neck.  Band-aids were applied.  Patient was brought to the recovery, ambulated on their own after the procedure and reported decreased right sided neck pain. \par \par Anesthesia personnel were present throughout the procedure. Patient was told to apply ice to the neck for 20 minutes on and 20 minutes off for 24-48 hours. Patient is to call the office if they had any questions or concerns.   The patient was also instructed to contact me immediately if there were any problems.\par \par Genoveva Monroe M.D.\par

## 2022-10-24 NOTE — PROCEDURE
[FreeTextEntry3] : Date of Service: 10/24/2022 \par \par Account: [id number]\par \par Patient: RASHARD GALLEGOS \par \par YOB: 1960\par \par Age: 62 year\par \par \par RIGHT RFA CERVICAL UPPER FACET\par \par Surgeon: Genoveva Monroe M.D.\par \par Assistant: None.\par \par Pre-Operative Diagnosis: \par \par Post Operative Diagnosis: \par \par Procedure: Right Third Occipital Nerve, C3, C4, C5 Medial Branch Radiofrequency Neurolysis under fluoroscopic guidance.               \par \par Anesthesia:             MAC\par \par \par This procedure was carried out using fluoroscopic guidance.  The risks and benefits of the procedure were discussed extensively with the patient.  The consent of the patient was obtained and the following procedure was performed.\par \par The patient was placed in the prone position.  The patient's neck was prepped and draped in a sterile fashion.  The C2,C3,C4,C5 vertebral levels were identified and the fluoroscope right obliqued to approximately 10 degrees to reveal the "waste" of the left articular pillars at the C2,C3,C4,C5 levels and these were marked.  The skin at these target points was then localized using 1 cc of 1% Lidocaine without epinephrine at each injection site.  A 20 Gauge 100mm debbie needle with a 5mm active curved tip was then introduced and advanced at these four levels to the above target points, and under lateral view the needle tip was advanced a few millimeters and confirmed at the middle of the classical "trapezius" structure on oss. \par \par The stylette for the most cephalad needle at the C2 level was then removed and a [place] radiofrequency probe was then placed inside the needle. After impedences were checked at approximately 240 ohm, 50 hertz sensory stimulation was performed.  Patient experienced concordant pain in their right neck at approximately 0.6 volts. The voltage was then increased to 1 volt. The patient reported increased right neck pain symptoms without any radiation below the left shoulder.  Stimulation was then changed to 2 hertz and increased slowly by 0.1 volt increments at approximately 0.8 volts, patient began to experience thumping like reproductive pain in their right neck. The voltage was then increased to approximately 2.5 volts. Patient experienced increasing thumping without any sensation below his right shoulder. This exact stimulation was then repeated for the C3,C4,C5 needles reproductive right sided neck pain and no radiation below the right shoulder. The 4 levels were then anesthetized with approximately 0.5 cc's of 1% Lidocaine. After which each area was then ablated at 80 degrees centigrade for 60 seconds each. Patient felt no pain reproduction during the ablation procedure.  After each of these levels were ablated, approximately 0.5 cc of 0.25% Bupivacaine plus 12 mg of Celestone were then injected before the needles were removed.  Pressure was then applied to the neck.  Band-aids were applied.  Patient was brought to the recovery, ambulated on their own after the procedure and reported decreased right sided neck pain. \par \par Anesthesia personnel were present throughout the procedure. Patient was told to apply ice to the neck for 20 minutes on and 20 minutes off for 24-48 hours. Patient is to call the office if they had any questions or concerns.   The patient was also instructed to contact me immediately if there were any problems.\par \par Genoveva Monroe M.D.\par

## 2022-10-31 ENCOUNTER — NON-APPOINTMENT (OUTPATIENT)
Age: 62
End: 2022-10-31

## 2022-10-31 ENCOUNTER — APPOINTMENT (OUTPATIENT)
Dept: PULMONOLOGY | Facility: CLINIC | Age: 62
End: 2022-10-31

## 2022-10-31 ENCOUNTER — RX RENEWAL (OUTPATIENT)
Age: 62
End: 2022-10-31

## 2022-10-31 VITALS
BODY MASS INDEX: 27.05 KG/M2 | DIASTOLIC BLOOD PRESSURE: 82 MMHG | SYSTOLIC BLOOD PRESSURE: 120 MMHG | OXYGEN SATURATION: 96 % | TEMPERATURE: 94.9 F | HEART RATE: 83 BPM | RESPIRATION RATE: 16 BRPM | HEIGHT: 62 IN | WEIGHT: 147 LBS

## 2022-10-31 DIAGNOSIS — Z71.89 OTHER SPECIFIED COUNSELING: ICD-10-CM

## 2022-10-31 PROCEDURE — 94010 BREATHING CAPACITY TEST: CPT

## 2022-10-31 PROCEDURE — 99214 OFFICE O/P EST MOD 30 MIN: CPT | Mod: 25

## 2022-10-31 PROCEDURE — 95012 NITRIC OXIDE EXP GAS DETER: CPT

## 2022-10-31 RX ORDER — AMOXICILLIN 500 MG/1
500 CAPSULE ORAL
Qty: 40 | Refills: 0 | Status: ACTIVE | COMMUNITY
Start: 2022-05-04

## 2022-10-31 RX ORDER — OLMESARTAN MEDOXOMIL AND HYDROCHLOROTHIAZIDE 20; 12.5 MG/1; MG/1
20-12.5 TABLET ORAL
Qty: 30 | Refills: 0 | Status: ACTIVE | COMMUNITY
Start: 2022-05-30

## 2022-10-31 RX ORDER — NIRMATRELVIR AND RITONAVIR 300-100 MG
20 X 150 MG & KIT ORAL
Qty: 30 | Refills: 0 | Status: ACTIVE | COMMUNITY
Start: 2022-08-29

## 2022-10-31 RX ORDER — TOPIRAMATE 50 MG/1
50 TABLET, FILM COATED ORAL TWICE DAILY
Qty: 60 | Refills: 0 | Status: ACTIVE | COMMUNITY
Start: 2022-08-19 | End: 1900-01-01

## 2022-10-31 RX ORDER — BUPROPION HYDROCHLORIDE 150 MG/1
150 TABLET, EXTENDED RELEASE ORAL
Qty: 30 | Refills: 0 | Status: ACTIVE | COMMUNITY
Start: 2022-05-14

## 2022-10-31 RX ORDER — DULOXETINE HYDROCHLORIDE 60 MG/1
60 CAPSULE, DELAYED RELEASE PELLETS ORAL
Qty: 30 | Refills: 0 | Status: ACTIVE | COMMUNITY
Start: 2022-09-06

## 2022-10-31 NOTE — ADDENDUM
[FreeTextEntry1] : Documented by Arley Jones acting as a scribe for Dr. Mathew Rao on 10/31/2022 .\par \par All medical record entries made by the Scribe were at my, Dr. Mathew Rao's, direction and personally dictated by me on. I have reviewed the chart and agree that the record accurately reflects my personal performance of the history, physical exam, assessment and plan. I have also personally directed, reviewed, and agree with the discharge instructions.

## 2022-10-31 NOTE — HISTORY OF PRESENT ILLNESS
[FreeTextEntry1] : Ms. Dickerson is a 62 year old female with a history of allergic rhinitis, asthma, atypical chest pain, GERD, CRYSTAL, and SOB presenting to the office today for a follow up visit. His chief complaint is\par -she notes having covid-19 8/2022\par -she notes trying to get with Dr. Rao but not being able to speak to Dr. Rao \par -she notes getting a hearing aid for her tinnitus \par -she notes waking up in the morning with fatigue \par -she notes feeling breathy and sob when exerting herself \par -she notes stable weight \par -she notes having a fall in april and broke her hands \par -she notes using rescue inhaler, trelegy, oral appliance \par patient denies any headaches, nausea, vomiting, fever, chills, sweats, chest pain, chest pressure, palpitations, coughing, wheezing, fatigue, diarrhea, constipation, dysphagia, myalgias, dizziness, leg swelling, leg pain, itchy eyes, itchy ears, heartburn, reflux or sour taste in the mouth

## 2022-10-31 NOTE — ASSESSMENT
[FreeTextEntry1] : Ms. Dickerson is a 62 year old female with vertigo, fibromyalgia, asthma, allergies, GERD, OSAS and mild SOB (non-compliant) presenting to the office today for a follow up pulmonary evaluation. Her number one issue is orthopedic issues / healing  - knee, wrists, neuroma; #1 issue is knee problems and #2 is post covid-19 sx \par \par problem 1: asthma (active) \par -continue to use Levalbuterol  0.63% by the nebulizer q6H\par -continue to use Accolate 20 mg BID \par -continue Trelegy 1 puff QD  \par -continue ProAir 2 puffs Q6H, pre-exercise\par -continue to use Ventolin PRN \par -Asthma is  believed to be caused by inherited (genetic) and environmental factor, but its exact cause is unknown. Asthma may be triggered by allergens, lung infections, or irritants in the air. Asthma triggers are different for each person\par -Inhaler technique reviewed as well as oral hygiene techniques reviewed with patient. Avoidance of cold air, extremes of temperature, rescue inhaler should be used before exercise. Order of medication reviewed with patient. Recommended use of a cool mist humidifier in the bedroom.\par \par problem 2: allergic rhinitis - controlled\par -continue Xhance 1 sniff BID \par -continue to use nasal saline/Xlear; Navage nasal spray\par -can continue OTC antihistamine PRN \par -continue to use Astelin (.15) 1 sniff/nostril BID \par \par -Environmental measures for allergies were encouraged including mattress and pillow cover, air purifier, and environmental controls. \par \par problem 3: acute/chronic sinusitis -Nasal Polyps \par -recommended CT of sinuses w/o contrast\par -Consider Dupixent. \par \par problem 4: CRYSTAL (on Rx) \par -Dental Device ([place])\par -recommended positional sleep \par -recommended to use "Chin Strap" \par -recommended to use extended release melatonin \par -Sleep apnea is associated with adverse clinical consequences which an affect most organ systems.  Cardiovascular disease risk includes arrhythmias, atrial fibrillation, hypertension, coronary artery disease, and stroke. Metabolic disorders include diabetes type 2, non-alcoholic fatty liver disease. Mood disorder especially depression; and cognitive decline especially in the elderly. Associations with  chronic reflux/Brothers’s esophagus some but not all inclusive. \par -Reasons to assess this include arousal consistent with hypopnea; respiratory events most prominent in REM sleep or supine position; therefore sleep staging and body position are important for accurate diagnosis and estimation of AHI.\par -Treatment options discussed including CPAP/BiPAP machine, oral appliance, ProVent therapy, Oxy-Aid by [employer]pitec, new technologies, or positional sleep.Recommended use of the CPAP machine for moderate (AHI >15), moderate to severe (AHI 15-30) and severe patients (AHI > 30). Recommended weight loss which can reduce AHI especially in weight loss of greater than 5% of BMI. Positional sleep is recommended in those with low AHI, low-moderate BMI, and younger age. For severe sleep apnea, the hypoglossal nerve stimulator was recommended as well.\par \par problem 5: GERD ([place]) \par -continue AcipHex 20 mg before breakfast\par \par -Rule of 2s: avoid eating too much, eating too late, eating too spicy, eating two hours before bed\par -Things to avoid including overeating, spicy foods, tight clothing, eating within three hours of bed, this list is not all inclusive. \par -For treatment of reflux, possible options discussed including diet control, H2 blockers, PPIs, as well as coating motility agents discussed as treatment options. Timing of meals and proximity of last meal to sleep were discussed. If symptoms persist, a formal gastrointestinal evaluation is needed.\par \par problem 6: atypical chest pain (resolved) \par -felt to be related to reflux and anxiety \par \par problem 7: anxiety \par -recommended to read: "The Gift of Maybe," by Rebeca Aguilar \par \par problem 8: overweight\par -recommended to try WW. \par Weight loss, exercise, and diet control were discussed and are highly encouraged. Treatment options were given such as, aqua therapy, and contacting a nutritionist. Recommended to use the elliptical, stationary bike, less use of treadmill. Mindful eating was explained to the patient Obesity is associated with worsening asthma, shortness of breath, and potential for cardiac disease, diabetes, and other underlying medical conditions. \par \par \par Problem 9: Health Maintenance/COVID19 Precautions: (COVID-19 x2 12/2021, 8/2022)\par -s/p COVID-19 vaccine x3 12/2021\par - Clean your hands often. Wash your hands often with soap and water for at least 20 seconds, especially after blowing your nose, coughing, or sneezing, or having been in a public place.\par - If soap and water are not available, use a hand  that contains at least 60% alcohol.\par - To the extent possible, avoid touching high-touch surfaces in public places - elevator buttons, door handles, handrails, handshaking with people, etc. Use a tissue or your sleeve to cover your hand or finger if you must touch something.\par - Wash your hands after touching surfaces in public places.\par - Avoid touching your face, nose, eyes, etc.\par - Clean and disinfect your home to remove germs: practice routine cleaning of frequently touched surfaces (for example: tables, doorknobs, light switches, handles, desks, toilets, faucets, sinks & cell phones)\par - Avoid crowds, especially in poorly ventilated spaces. Your risk of exposure to respiratory viruses like COVID-19 may increase in crowded, closed-in settings with little air circulation if\par there are people in the crowd who are sick. All patients are recommended to practice social distancing and stay at least 6 feet away from others.\par - Avoid all non-essential travel including plane trips, and especially avoid embarking on cruise ships.\par -If COVID-19 is spreading in your community, take extra measures to put distance between yourself and other people to further reduce your risk of being exposed to this new virus.\par -Stay home as much as possible.\par - Consider ways of getting food brought to your house through family, social, or commercial networks\par -Be aware that the virus has been known to live in the air up to 3 hours post exposure, cardboard up to 24 hours post exposure, copper up to 4 hours post exposure, steel and plastic up to 2-3 days post exposure. Risk of transmission from these surfaces are affected by many variables.\par \par Immune Support Recommendations:\par -OTC Vitamin C 500mg BID \par -OTC Quercetin 250-500mg BID \par -OTC Zinc 75-100mg per day \par -OTC Melatonin 1or 2mg a night \par -OTC Vitamin D 1-4000mg per day\par -Tonic Water 8oz\par -Recommended to Stay Hydrated (At least a gallon/day)\par \par Asthma and COVID19:\par You need to make sure your asthma is under control. This often requires the use of inhaled corticosteroids (and sometimes oral corticosteroids). Inhaled corticosteroids do not likely reduce your immune system’s ability to fight infections, but oral corticosteroids may. It is important to use the steps above to protect yourself to limit your exposure to any respiratory virus. \par \par problem 10: health maintenance \par -s/p yearly flu shot 9/3/2022\par -recommended strep pneumonia vaccines: Prevnar-13 vaccine, followed by Pneumo vaccine 23 one year following\par -recommended early intervention for URIs\par -recommended regular osteoporosis evaluations\par -recommended early dermatological evaluations\par -recommended after the age of 50 to the age of 70, colonoscopy every 5 years \par \par F/U in 6 months [facility] and [facility].\par She is encouraged to call with any changes, concerns, or questions.

## 2022-10-31 NOTE — PROCEDURE
[FreeTextEntry1] : PFT - spi reveals normal flows; FEV1 is 2.01 which is 89% of predicted, normal flow volume loop

## 2022-11-02 ENCOUNTER — APPOINTMENT (OUTPATIENT)
Dept: ORTHOPEDIC SURGERY | Facility: CLINIC | Age: 62
End: 2022-11-02

## 2022-11-02 ENCOUNTER — NON-APPOINTMENT (OUTPATIENT)
Age: 62
End: 2022-11-02

## 2022-11-02 VITALS — WEIGHT: 147 LBS | HEIGHT: 62 IN | BODY MASS INDEX: 27.05 KG/M2

## 2022-11-02 PROCEDURE — 73562 X-RAY EXAM OF KNEE 3: CPT | Mod: LT

## 2022-11-02 PROCEDURE — 99204 OFFICE O/P NEW MOD 45 MIN: CPT | Mod: 25

## 2022-11-02 PROCEDURE — 20610 DRAIN/INJ JOINT/BURSA W/O US: CPT | Mod: LT

## 2022-11-02 NOTE — DISCUSSION/SUMMARY
[de-identified] : Discussed at length the nature of the patient’s condition. Their left TKR is painful now almost at a year. It appears that she may have flexion instability, especially with starter pain and with recurrent effusion.\par \par The left knee was aspirated as described above and I have ordered CBC, ESR, CRP, and D-DIMER as a baseline to rule out infection. There may also be an inflammatory component. She did state that she may have an allergy to metals and I have ordered allergy testing. \par \par Once results available will discuss further.\par \par I did explain that if it is infected, it will be a 2 staged revision and if she has an allergy to metal it will be a revision with a juani free component. \par \par She may have some referred pain from her lower back condition but I think this is a primary knee problem.\par \par In the interim she will continue with home exercise and weight management. Patient can continue activities as tolerated. All questions answered, understanding verbalized. Patient in agreement with plan of care.

## 2022-11-02 NOTE — PHYSICAL EXAM
[de-identified] : General appearance: well nourished and hydrated, pleasant, alert and oriented x 3, cooperative.\par HEENT: Normocephalic, EOM intact, Nasal septum midline, Oral cavity clear, External auditory canal clear.\par Cardiovascular: no apparent abnormalities, no lower leg edema, no varicosities, pedal pulses are palpable.\par Lymphatics Lymph nodes: none palpated, Lymphedema: not present.\par Neurologic: sensation is normal, no muscle weakness in upper or lower extremities, patella tendon reflexes intact .\par Dermatologic no apparent skin lesions, moist, warm, no rash.\par Spine:cervical spine appears normal and moves freely, thoracic spine appears normal and moves freely, lumbosacral spine surgical incision, tenderness and mobility\par Gait: nonantalgic.\par \par Left knee\par Inspection: mild effusion, popiteal cyst\par Wounds: healed midline incision\par Alignment: normal.\par Palpation: medial joint line, lateral joint line, gurdys tubercle and pes tenderness on palpation.\par ROM active (in degrees): 0-110 with pain through arc of motion \par Ligamentous laxity: stable in full extension, AP translation 5-10mm,no posterior sag, medial lateral laxity 3-5mm in extension\par Patellofemoral Alignment Test: Q angle-, normal.\par Muscle Test: good quad strength.\par \par Right knee\par Inspection: no effusion or erythema.\par Wounds: none.\par Alignment: normal.\par Palpation: no specific tenderness on palpation.\par ROM active (in degrees): 0-130\par Ligamentous laxity: all ligaments appear stable,, negative ant. drawer test, negative post. drawer test, stable to varus stress test, stable to valgus stress test. negative Lachman's test, negative pivot shift test\par Meniscal Test: negative McMurrays, negative Luis Alfredo.\par Patellofemoral Alignment Test: Q angle-, normal.\par Muscle Test: good quad strength.\par \par Left hip\par Inspection: No swelling or ecchymosis.\par Wounds: none.\par Palpation: non-tender.\par Stability: no instability.\par Strength: 5/5 all motor groups.\par ROM: no pain with FROM.\par Leg length: equal.\par \par Right hip\par Inspection: No swelling or ecchymosis.\par Wounds: none.\par Palpation: non-tender.\par Stability: no instability.\par Strength: 5/5 all motor groups.\par ROM: no pain with FROM.\par Leg length: equal.\par \par Left ankle\par Inspection: no erythema noted, no swelling noted.\par Palpation: no pain on palpation .\par ROM: FROM without crepitus.\par Muscle strength: 5/5.\par Stability: no instability noted.\par \par Right ankle\par Inspection: no erythema noted, no swelling noted.\par ROM: FROM without crepitus.\par Palpation: no pain on palpation .\par Muscle strength: 5/5.\par Stability: no instability noted.\par \par Left foot\par Inspection: color, texture and turgor are normal.\par ROM: full range of motion of all joints without pain or crepitus.\par Palpation: no tenderness.\par Stability: no instability noted.\par \par Right foot\par Inspection: color, texture and turgor are normal.\par ROM: full range of motion of all joints without pain or crepitus.\par Palpation: no tenderness.\par Stability: no instability noted.\par \par Left shoulder\par Inspection: no muscle asymmetry, no atrophy.\par Palpation: no tenderness noted, ACJ non-tender.\par ROM: full active ROM, full passive ROM.\par Strength testing): anterior deltoid, supraspinatus, infraspinatus, subscapularis all 5/5.\par Stability test: ant. apprehension negative, post. apprehension negative, relocation test negative.\par Impingement Test: negative NEER.\par \par Right shoulder\par Inspection: no muscle asymmetry, no atrophy.\par Palpation: no tenderness noted, ACJ non-tender.\par ROM: full active ROM, full passive ROM.\par Strength testing): anterior deltoid, supraspinatus, infraspinatus, subscapularis all 5/5.\par Stability test: ant. apprehension negative, post. apprehension negative, relocation test negative.\par Impingement Test: negative NEER.\par Surgical Wounds: none.\par \par Left elbow\par Inspection: negative swelling.\par Wounds: none.\par Palpation: non-tender.\par ROM: full ROM.\par Strength: 5/5 all groups.\par Stability: no instability.\par Mass: none.\par \par Right elbow\par Inspection: negative swelling.\par Wounds: none.\par Palpation: non-tender.\par ROM: full ROM.\par Strength: 5/5 all groups.\par Stability: no instability.\par Mass: none.\par \par Left wrist\par Inspection: negative swelling.\par Wound: none.\par Palpation (bone): no tenderness.\par ROM: full ROM.\par Strength: full , good.\par \par Right wrist\par Inspection: negative swelling.\par Wound: none.\par Palpation (bone): no tenderness.\par ROM: full ROM.\par Strength: full , good.\par \par Left hand\par Inspection: no skin changes, normal appearance.\par Wounds: none.\par Strength: full , able to make full fist.\par Sensation: light touch intact all fingers and thumb.\par Vascular: good capillary refill < 3 seconds, all fingers and thumb.\par Mass: none.\par \par Right hand\par Inspection: no skin changes, normal appearance.\par Wounds: none.\par Strength: full , able to make full fist.\par Sensation: light touch intact all fingers and thumb.\par Vascular: good capillary refill < 3 seconds, all fingers and thumb.\par Mass: none. [de-identified] : Left knee xray, AP, lateral, merchant view taken at the office today demonstrates a total knee replacement in satisfactory position and alignment. No evidence of loosening. The patella sits in a central position.\par \par Right knee xray merchant view taken at the office today demonstrates good joint space and a well centered patella.

## 2022-11-02 NOTE — ADDENDUM
[FreeTextEntry1] : This note was written by Kim Khanna on 11/02/2022 acting as scribe for Dr. Stephan Salvador M.D.\par \par I, Dr. Stephan Salvador, have read and attest that all the information, medical decision making and discharge instructions within are true and accurate.

## 2022-11-02 NOTE — PROCEDURE
[de-identified] : LEFT KNEE ASPIRATION\par Discussed at length the procedure of a knee aspiration. The risks, benefits, convalescence and alternatives were reviewed. The possible side effects discussed included but were not limited to: pain, swelling, bleeding and infection. Following this discussion, the knee was prepped with betadine and under a sterile condition, an 18 gauge needle was inserted into the joint through a lateral approach just superior to the patella with the knee in an extended position. The fluid was aspirated and sent for evaluation. Upon withdrawal of the needle the site was cleaned with alcohol and a bandaid applied. The patient tolerated the aspiration well and there were no adverse effects. Post aspiration instructions included no strenuous activity for 24 hours, cryotherapy and if there are any adverse effects to contact the office.\par \par 18 cc of diana synovial fluid were aspirated from the left knee.

## 2022-11-03 ENCOUNTER — LABORATORY RESULT (OUTPATIENT)
Age: 62
End: 2022-11-03

## 2022-11-10 NOTE — REVIEW OF SYSTEMS
[Joint Pain] : joint pain [Joint Stiffness] : joint stiffness [Negative] : Heme/Lymph Alert and oriented to person, place and time, memory intact, behavior appropriate to situation, PERRL.

## 2022-11-11 ENCOUNTER — APPOINTMENT (OUTPATIENT)
Dept: PAIN MANAGEMENT | Facility: CLINIC | Age: 62
End: 2022-11-11

## 2022-11-11 VITALS — BODY MASS INDEX: 27.05 KG/M2 | WEIGHT: 147 LBS | HEIGHT: 62 IN

## 2022-11-11 PROCEDURE — 20553 NJX 1/MLT TRIGGER POINTS 3/>: CPT

## 2022-11-11 PROCEDURE — 99214 OFFICE O/P EST MOD 30 MIN: CPT | Mod: 25

## 2022-11-11 PROCEDURE — J3490M: CUSTOM

## 2022-11-11 NOTE — ASSESSMENT
[FreeTextEntry1] : After discussing various treatment options with the patient including but not limited to oral medications, physical therapy, exercise, modalities as well as interventional spinal injections, we have decided with the following plan:\par \par 1) Intervention Injection Therapy:\par I personally reviewed the MRI/CT scan images and agree with the radiologist's report. The radiological findings were discussed with the patient.\par The risks, benefits, contents and alternatives to injection were explained in full to the patient. Risks outlined include but are not limited to infection,sepsis, bleeding, post-dural puncture headache, nerve damage, temporary increase in pain, syncopal episode, failure to resolve symptoms, allergic reaction, symptom recurrence, and elevation of blood sugar in diabetics. Cortisone may cause immunosuppression. Patient understands the risks. All questions were answered. After discussion of options, patient requested an injection. Information regarding the injection was given to the patient. Which medications to stop prior to the injection was explained to the patient as well.\par \par Follow up in 1-2 weeks post injection for re-evaluation. \par Continue Home exercises, stretching, activity modification, physical therapy, and conservative care.\par \par Patient has cervical axial pain that is consistent with facet joint pathology.  A diagnostic temporary block with local anesthetic  of the medial branch was performed and has resulted in at least a 80% reduction in pain for the duration of the specific local anesthetic effect.  The pain is not radicular and there is absence of nerve root compression.  There is no prior spinal fusion surgery at the level targeted.  The pain has failed to respond to three months of conservative therapy.\par \par \par 2) I would recommend a trial of neuropathic medication as patient presents with signs of nerve irritation. (ie burning, paresthesias etc) Goals of therapy would be to improve pain and overall QOL. Side effects reviewed with patient. Patient will call or stop medication if given side effects occur. \par \par SI joint left

## 2022-11-11 NOTE — PHYSICAL EXAM
[Rotation to left] : rotation to left [Rotation to right] : rotation to right [] : no palpable masses [de-identified] : left lateral rotation 75 degrees [TWNoteComboBox6] : right lateral rotation 75 degrees

## 2022-11-11 NOTE — HISTORY OF PRESENT ILLNESS
[Neck] : neck [Lower back] : lower back [Intermittent] : intermittent [Rest] : rest [Meds] : meds [Ice] : ice [Heat] : heat [Standing] : standing [Walking] : walking [Bending forward] : bending forward [Exercising] : exercising [Retired] : Work status: retired [3] : 3 [0] : 0 [Dull/Aching] : dull/aching [Sleep] : sleep [Injection therapy] : injection therapy [FreeTextEntry1] : 11/11/2022: follow up today after right cervical RFA on 10/24/22. Had 80% relief so far. Now pain is in left SI joint will repeat injection.  \par \par 08/18/2022: follow up today after left cervical RFA on 7/22/22.  Had 50% so far.  Has not had as much improvement as in past.  Also complains of pain in thoracic area.  Will give TPI\par \par 7/15/22-  L LS MBB without relief;  c/o pain in lower back, mainly L sided with cramping in L ankle ; consider caudal michael prior L upper cerv RFA with >70% relief sustained several months with improved rom and adls, will repeat. Has been on Topamax in the past with good relief. \par \par 3/25/22: follow up today to MRI lumbar spine: (3/17/22) Impression:\par 1. Mild central stenosis with bilateral exiting L3 nerve root impingement at L3-L4, scoliosis, and postoperative changes\par without recurrent disc herniation at L4-L5.\par 2. Multilevel degenerative disc disease and posterior protrusion at T11-T12 with hemangioma at L1 and multilevel\par foraminal narrowing.\par 3. No acute fracture, discitis, or enhancing postoperative fluid collection on postcontrast imaging.\par 4. Clinical correlation regarding prior surgical history is recommended.\par has pain in the right foot when she puts weight on it. Thought initially it was a neuroma. however ruled out with\par imaging.\par \par Had fusion L4/5 in September 2020. I would recommend EMG/NCV>\par She would like to repeat cervical RFA as well.\par \par 3/9/22: follow up toady. Pain in the left trap. yesterday she bent down and she felt a pop. now when she bends she feels like her back is going to give out. difficulty with change of position. Pain in the left lower back. Pain radiates down the posterior left leg.\par \par 8/13/21: follow up today after left SIJ injection on 7/23/21. She reports great relief initially and pain recurred about 3-4 days later. takes flexeril PRN (makes her sleepy) Pain under the left shoulder blade. Had CHERYL's in the past with relief. Consider left SIJ RFA.\par \par 7/23/21: follow up today after right cervical RFA on 7/8/21. patient starting to feel better. Since last visit had CT scan and met with Dr. Hunter Chow who recommended a Left SIJ injection.\par \par 2/26/21: follow up today after right cervical RFA on 11/13/21. Had 60% improvement but now the left side of neck is hurting again. MRI 10/20- S/P L4-L5 laminectomy . Hardware intact\par Will give TPI today and schedule repeat RFA on left. SCS info given\par \par 8.14.20: follow up today after left cervical RFA. Pt with some improvement. I would recommend she give it another 4 weeks. I would recommend right cervical RFA as it has helped in the past.\par \par 7.3.20 - follow up today after left L5/S1 and S1 and CHERYL. The neck improved about 50%.Has left lower back pain. sitting aggravated her left lower back. numbness in the legs. last left lumbar RFA was in September. [] : no [FreeTextEntry7] : to the left side [FreeTextEntry9] : Motrin  [de-identified] : Driving [TWNoteComboBox1] : 50%

## 2022-11-14 ENCOUNTER — APPOINTMENT (OUTPATIENT)
Dept: PHARMACY | Facility: CLINIC | Age: 62
End: 2022-11-14

## 2022-11-15 LAB
BASOPHILS # BLD AUTO: 0.02 K/UL
BASOPHILS NFR BLD AUTO: 0.4 %
CRP SERPL-MCNC: <3 MG/L
DEPRECATED D DIMER PPP IA-ACNC: 154 NG/ML DDU
EOSINOPHIL # BLD AUTO: 0.03 K/UL
EOSINOPHIL NFR BLD AUTO: 0.6 %
ERYTHROCYTE [SEDIMENTATION RATE] IN BLOOD BY WESTERGREN METHOD: 2 MM/HR
HCT VFR BLD CALC: 41.7 %
HGB BLD-MCNC: 13.5 G/DL
IMM GRANULOCYTES NFR BLD AUTO: 0.4 %
LYMPHOCYTES # BLD AUTO: 1.03 K/UL
LYMPHOCYTES NFR BLD AUTO: 19.7 %
MAN DIFF?: NORMAL
MCHC RBC-ENTMCNC: 30.8 PG
MCHC RBC-ENTMCNC: 32.4 GM/DL
MCV RBC AUTO: 95 FL
MONOCYTES # BLD AUTO: 0.38 K/UL
MONOCYTES NFR BLD AUTO: 7.3 %
NEUTROPHILS # BLD AUTO: 3.74 K/UL
NEUTROPHILS NFR BLD AUTO: 71.6 %
PLATELET # BLD AUTO: 336 K/UL
RBC # BLD: 4.39 M/UL
RBC # FLD: 14.1 %
WBC # FLD AUTO: 5.22 K/UL

## 2022-11-17 ENCOUNTER — APPOINTMENT (OUTPATIENT)
Dept: ORTHOPEDIC SURGERY | Facility: CLINIC | Age: 62
End: 2022-11-17

## 2022-11-17 VITALS — BODY MASS INDEX: 27.05 KG/M2 | HEIGHT: 62 IN | WEIGHT: 147 LBS

## 2022-11-17 PROCEDURE — 99203 OFFICE O/P NEW LOW 30 MIN: CPT

## 2022-11-17 PROCEDURE — 73630 X-RAY EXAM OF FOOT: CPT | Mod: RT

## 2022-11-17 PROCEDURE — L3260: CPT | Mod: RT

## 2022-11-17 NOTE — HISTORY OF PRESENT ILLNESS
[de-identified] : 11/17/22: Pt is a 62 year old female who presents for right foot pain. Pt states in 2017, she had a5th ht repair by right foot by Dr. Stacy. Pt states on 11/15/22, she stepped off the last step and when she stepped on the floor, herfourth toe bent. Pt denies swelling to toe. Pt denies numbness/tingling. Pt describes pain as sharp. Pt states pain is most prevalent with putting too much pressure on toe. Pt notes pain is present while walking, and limps due to pain. Pt notes pain is unbearable while walking down stairs. Pt states within the last few years she noticed the shape of her toe change, and chronic pain. Pt did not see anyone for injury. Pt did not take medications to alleviate pain. Pt ices. difficulty bearing weight.

## 2022-11-18 ENCOUNTER — APPOINTMENT (OUTPATIENT)
Dept: ORTHOPEDIC SURGERY | Facility: CLINIC | Age: 62
End: 2022-11-18

## 2022-11-18 ENCOUNTER — APPOINTMENT (OUTPATIENT)
Dept: PAIN MANAGEMENT | Facility: CLINIC | Age: 62
End: 2022-11-18

## 2022-11-18 PROCEDURE — G2012 BRIEF CHECK IN BY MD/QHP: CPT

## 2022-11-18 PROCEDURE — 27096 INJECT SACROILIAC JOINT: CPT | Mod: LT

## 2022-11-18 PROCEDURE — J3490M: CUSTOM

## 2022-11-18 NOTE — PROCEDURE
[FreeTextEntry3] : Date of Service: 11/18/2022 \par \par Account: [id number]\par \par Patient: RASHARD GALLEGOS \par \par YOB: 1960\par \par Age: 62 year\par \par \par Surgeon:  Genoveva Monroe M.D.\par \par Pre-Operative Diagnosis: \par \par Post Operative Diagnosis: \par \par Procedure: Left Sacroiliac joint injection under fluoroscopic guidance\par \par                     Left Sacroiliac joint arthrogram  \par \par Anesthesia:  MAC\par \par \par This procedure was carried out using fluoroscopic guidance.  The risks and benefits of the procedure were discussed extensively with the patient.  The consent of the patient was obtained and the following procedure was performed.\par \par The patient was placed in the prone position.  The patient's back was prepped and draped in a sterile fashion.  The fluoroscopic image intensifier was then positioned so that anterior and posterior portion of the sacroiliac joint lined up in the same plane and appeared on the image as clear space.  This was achieved with slight cephalad and right medial angulation.  The area corresponding to the left inferior SIJ space was then identified and marked. \par \par The skin and subcutaneous structures were then anesthetized using 1 cc of 1% lidocaine.  A 22 gauge spinal needle was then inserted and directed into the right sacroiliac joint space using fluoroscopic guidance.  After negative aspiration for heme and CSF, 2 cc of Omnipaque was injected and appeared to fill the joint space.  An injectate of 3cc 0.25% marcaine plus 80 mg of Kenalog was then injected into the left sacroiliac joint space.\par \par The needles were then removed and pressure was applied.  Anesthesia personnel were present throughout the procedure.\par \par The patient was instructed to apply ice over the injection site for twenty minutes every two hours for the next 24 to 48 hours.  The patient was also instructed to contact me immediately if there were any problems. \par \par Genoveva Monroe M.D.\par

## 2022-11-28 ENCOUNTER — RX RENEWAL (OUTPATIENT)
Age: 62
End: 2022-11-28

## 2022-12-05 ENCOUNTER — APPOINTMENT (OUTPATIENT)
Dept: PAIN MANAGEMENT | Facility: CLINIC | Age: 62
End: 2022-12-05

## 2022-12-08 ENCOUNTER — APPOINTMENT (OUTPATIENT)
Dept: ORTHOPEDIC SURGERY | Facility: CLINIC | Age: 62
End: 2022-12-08

## 2022-12-09 ENCOUNTER — APPOINTMENT (OUTPATIENT)
Dept: ORTHOPEDIC SURGERY | Facility: CLINIC | Age: 62
End: 2022-12-09

## 2022-12-09 VITALS — BODY MASS INDEX: 27.05 KG/M2 | HEIGHT: 62 IN | WEIGHT: 147 LBS

## 2022-12-09 DIAGNOSIS — M79.676 PAIN IN UNSPECIFIED TOE(S): ICD-10-CM

## 2022-12-09 PROCEDURE — 73630 X-RAY EXAM OF FOOT: CPT | Mod: RT

## 2022-12-09 PROCEDURE — 99213 OFFICE O/P EST LOW 20 MIN: CPT

## 2022-12-09 NOTE — HISTORY OF PRESENT ILLNESS
[Walking] : walking [8] : 8 [0] : 0 [Sharp] : sharp [Stabbing] : stabbing [Frequent] : frequent [Household chores] : household chores [Leisure] : leisure [Work] : work [Nothing helps with pain getting better] : Nothing helps with pain getting better [Standing] : standing [Stairs] : stairs [Retired] : Work status: retired [de-identified] : 11/17/22: Pt is a 62 year old female who presents for right foot pain. Pt states in 2017, she had a5th ht repair by right foot by Dr. Stacy. Pt states on 11/15/22, she stepped off the last step and when she stepped on the floor, herfourth toe bent. Pt denies swelling to toe. Pt denies numbness/tingling. Pt describes pain as sharp. Pt states pain is most prevalent with putting too much pressure on toe. Pt notes pain is present while walking, and limps due to pain. Pt notes pain is unbearable while walking down stairs. Pt states within the last few years she noticed the shape of her toe change, and chronic pain. Pt did not see anyone for injury. Pt did not take medications to alleviate pain. Pt ices. difficulty bearing weight. \par \par 12/9/2022: f/u R foot  wb in p-op shoe po tape   [] : no [FreeTextEntry1] : right foot [FreeTextEntry5] : pt states no change. pain when WB. pt wearing hard shoe.

## 2022-12-14 ENCOUNTER — APPOINTMENT (OUTPATIENT)
Dept: PAIN MANAGEMENT | Facility: CLINIC | Age: 62
End: 2022-12-14

## 2022-12-16 ENCOUNTER — RX RENEWAL (OUTPATIENT)
Age: 62
End: 2022-12-16

## 2022-12-20 ENCOUNTER — APPOINTMENT (OUTPATIENT)
Dept: PULMONOLOGY | Facility: CLINIC | Age: 62
End: 2022-12-20

## 2022-12-20 VITALS
BODY MASS INDEX: 26.68 KG/M2 | DIASTOLIC BLOOD PRESSURE: 80 MMHG | TEMPERATURE: 97.2 F | WEIGHT: 145 LBS | HEART RATE: 98 BPM | HEIGHT: 62 IN | OXYGEN SATURATION: 97 % | SYSTOLIC BLOOD PRESSURE: 120 MMHG | RESPIRATION RATE: 16 BRPM

## 2022-12-20 PROCEDURE — 94010 BREATHING CAPACITY TEST: CPT

## 2022-12-20 PROCEDURE — ZZZZZ: CPT

## 2022-12-20 PROCEDURE — 99214 OFFICE O/P EST MOD 30 MIN: CPT | Mod: 25

## 2022-12-20 PROCEDURE — 94727 GAS DIL/WSHOT DETER LNG VOL: CPT

## 2022-12-20 PROCEDURE — 95012 NITRIC OXIDE EXP GAS DETER: CPT

## 2022-12-20 PROCEDURE — 94729 DIFFUSING CAPACITY: CPT

## 2022-12-20 NOTE — HISTORY OF PRESENT ILLNESS
[Former] : former [< 20 pack-years] : < 20 pack-years [TextBox_4] : Ms. Dickerson is a 62 year old, former smoking, female. She has past medical history of vertigo, fibromyalgia, asthma, allergies, GERD, CRYSTAL (dental device used nightly), Covid-19 infection 8/29/2022, & orthopedic issues (prior left knee surgery). She presents to office today for pulmonary pre-op visit. She has upcoming surgery for Left Revision total knee replacement on 1/17/2023 with Dr. Salvador.\par \par Patient states she is in her baseline state of health.\par Patient notes she is compliant on daily Trelegy.\par Patient states the winter weather is a known trigger for her.  She states the issue of cold air outside and dry heat inside causes her to feel chest tightness more frequently.  She notes she does sleep with a humidifier in her room.  Patient last used albuterol inhaler this morning due to this issue and feeling chest tightness–after use she notes she felt benefit as symptom resolved. \par Patient states she uses her oral appliance nightly to help treat her sleep apnea.\par \par She denies fever/chills, decreased appetite, fatigue, SOB @ rest or exertion, or wheezing.  [TextBox_11] : 1 [TextBox_13] : 10 [YearQuit] : 1998

## 2022-12-20 NOTE — ASSESSMENT
[FreeTextEntry1] : Ms. Dickerson is a 62 year old, former smoking, female. She has past medical history of vertigo, fibromyalgia, asthma, allergies, GERD, CRYSTAL (dental device used nightly), Covid-19 infection 8/29/2022, & orthopedic issues (prior left knee surgery). She presents to office today for pulmonary pre-op visit. She has upcoming surgery for Left Revision total knee replacement on 1/17/2023 with Dr. Salvador.\par \par 1. Pre-Op Pulmonary Exam:\par - Patient asymptomatic of pulmonary concerns at this time. VS WNL. PFT's consistent with known asthma history. \par - Patient able to move forward with planned procedure at this time.\par \par 2. Asthma:\par - continue to use Levalbuterol 0.63% by the nebulizer q6H\par - continue to use Accolate 20 mg BID \par - continue Trelegy 1 puff QD - rinse and gargle post use.\par \par 3. CRYSTAL: \par - I have discussed all the negative health consequences associated with obstructive and central sleep apnea including heart conditions/MI, hypertension, diabetes, chronic inflammation, memory issues, stroke, obesity, decreased libido, sleep related accidents, as well as anxiety and depression. \par - Additional recommendations included: Avoid alcohol and sedatives at bedtime. Proper sleep hygiene such as maintaining a regular sleep routine, avoiding naps if possible, not watching TV or reading in bed,  and maintaining a quiet, comfortable bedroom. Sleepy driving avoidance and risks discussed with patient. \par - Diet, exercise and weight loss suggested.\par - Continue Dental device use nightly.\par \par Patient to follow up with Dr. Rao as scheduled for 3/13/23. \par Patient to call with further questions and concerns.\par Patient verbalizes understanding of care plan and is agreeable.\par \par \par \par \par

## 2022-12-20 NOTE — PROCEDURE
[FreeTextEntry1] : PFT's performed in office show mild obstructive lung defect.  Diffusion capacity is within normal limits.\par FEV1: 124% \par FEV1/FVC Ratio: 70% \par SDC87-78%: 78% \par DLCO: 119% \par \par Feno: 7 ppb

## 2022-12-20 NOTE — REASON FOR VISIT
[Follow-Up] : a follow-up visit [Pre-op Risk Stratification] : pre-op risk stratification [Asthma] : asthma

## 2022-12-29 ENCOUNTER — OUTPATIENT (OUTPATIENT)
Dept: OUTPATIENT SERVICES | Facility: HOSPITAL | Age: 62
LOS: 1 days | Discharge: HOME | End: 2022-12-29
Payer: COMMERCIAL

## 2022-12-29 VITALS
SYSTOLIC BLOOD PRESSURE: 125 MMHG | HEART RATE: 96 BPM | OXYGEN SATURATION: 98 % | RESPIRATION RATE: 17 BRPM | WEIGHT: 148.37 LBS | DIASTOLIC BLOOD PRESSURE: 98 MMHG | HEIGHT: 62 IN | TEMPERATURE: 98 F

## 2022-12-29 DIAGNOSIS — Z96.659 PRESENCE OF UNSPECIFIED ARTIFICIAL KNEE JOINT: Chronic | ICD-10-CM

## 2022-12-29 DIAGNOSIS — Z98.1 ARTHRODESIS STATUS: Chronic | ICD-10-CM

## 2022-12-29 DIAGNOSIS — G47.33 OBSTRUCTIVE SLEEP APNEA (ADULT) (PEDIATRIC): ICD-10-CM

## 2022-12-29 DIAGNOSIS — Z98.890 OTHER SPECIFIED POSTPROCEDURAL STATES: Chronic | ICD-10-CM

## 2022-12-29 DIAGNOSIS — T84.098A OTHER MECHANICAL COMPLICATION OF OTHER INTERNAL JOINT PROSTHESIS, INITIAL ENCOUNTER: ICD-10-CM

## 2022-12-29 DIAGNOSIS — I10 ESSENTIAL (PRIMARY) HYPERTENSION: ICD-10-CM

## 2022-12-29 DIAGNOSIS — J45.909 UNSPECIFIED ASTHMA, UNCOMPLICATED: ICD-10-CM

## 2022-12-29 DIAGNOSIS — Z01.818 ENCOUNTER FOR OTHER PREPROCEDURAL EXAMINATION: ICD-10-CM

## 2022-12-29 LAB
A1C WITH ESTIMATED AVERAGE GLUCOSE RESULT: 5.5 % — SIGNIFICANT CHANGE UP (ref 4–5.6)
ALBUMIN SERPL ELPH-MCNC: 4 G/DL — SIGNIFICANT CHANGE UP (ref 3.3–5)
ALP SERPL-CCNC: 74 U/L — SIGNIFICANT CHANGE UP (ref 40–120)
ALT FLD-CCNC: 35 U/L — SIGNIFICANT CHANGE UP (ref 12–78)
ANION GAP SERPL CALC-SCNC: 6 MMOL/L — SIGNIFICANT CHANGE UP (ref 5–17)
APPEARANCE UR: CLEAR — SIGNIFICANT CHANGE UP
APTT BLD: 28.6 SEC — SIGNIFICANT CHANGE UP (ref 27.5–35.5)
AST SERPL-CCNC: 27 U/L — SIGNIFICANT CHANGE UP (ref 15–37)
BASOPHILS # BLD AUTO: 0.02 K/UL — SIGNIFICANT CHANGE UP (ref 0–0.2)
BASOPHILS NFR BLD AUTO: 0.3 % — SIGNIFICANT CHANGE UP (ref 0–2)
BILIRUB SERPL-MCNC: 0.9 MG/DL — SIGNIFICANT CHANGE UP (ref 0.2–1.2)
BILIRUB UR-MCNC: NEGATIVE — SIGNIFICANT CHANGE UP
BLD GP AB SCN SERPL QL: SIGNIFICANT CHANGE UP
BUN SERPL-MCNC: 20 MG/DL — SIGNIFICANT CHANGE UP (ref 7–23)
CALCIUM SERPL-MCNC: 9.8 MG/DL — SIGNIFICANT CHANGE UP (ref 8.5–10.1)
CHLORIDE SERPL-SCNC: 105 MMOL/L — SIGNIFICANT CHANGE UP (ref 96–108)
CO2 SERPL-SCNC: 30 MMOL/L — SIGNIFICANT CHANGE UP (ref 22–31)
COLOR SPEC: YELLOW — SIGNIFICANT CHANGE UP
CREAT SERPL-MCNC: 0.9 MG/DL — SIGNIFICANT CHANGE UP (ref 0.5–1.3)
DIFF PNL FLD: NEGATIVE — SIGNIFICANT CHANGE UP
EGFR: 71 ML/MIN/1.73M2 — SIGNIFICANT CHANGE UP
EOSINOPHIL # BLD AUTO: 0.03 K/UL — SIGNIFICANT CHANGE UP (ref 0–0.5)
EOSINOPHIL NFR BLD AUTO: 0.4 % — SIGNIFICANT CHANGE UP (ref 0–6)
EPI CELLS # UR: SIGNIFICANT CHANGE UP
ESTIMATED AVERAGE GLUCOSE: 111 MG/DL — SIGNIFICANT CHANGE UP (ref 68–114)
GLUCOSE SERPL-MCNC: 87 MG/DL — SIGNIFICANT CHANGE UP (ref 70–99)
GLUCOSE UR QL: NEGATIVE MG/DL — SIGNIFICANT CHANGE UP
HCT VFR BLD CALC: 45.7 % — HIGH (ref 34.5–45)
HGB BLD-MCNC: 14.8 G/DL — SIGNIFICANT CHANGE UP (ref 11.5–15.5)
IMM GRANULOCYTES NFR BLD AUTO: 0.7 % — SIGNIFICANT CHANGE UP (ref 0–0.9)
INR BLD: 0.92 RATIO — SIGNIFICANT CHANGE UP (ref 0.88–1.16)
KETONES UR-MCNC: NEGATIVE — SIGNIFICANT CHANGE UP
LEUKOCYTE ESTERASE UR-ACNC: NEGATIVE — SIGNIFICANT CHANGE UP
LYMPHOCYTES # BLD AUTO: 1.23 K/UL — SIGNIFICANT CHANGE UP (ref 1–3.3)
LYMPHOCYTES # BLD AUTO: 17.7 % — SIGNIFICANT CHANGE UP (ref 13–44)
MCHC RBC-ENTMCNC: 29.4 PG — SIGNIFICANT CHANGE UP (ref 27–34)
MCHC RBC-ENTMCNC: 32.4 G/DL — SIGNIFICANT CHANGE UP (ref 32–36)
MCV RBC AUTO: 90.9 FL — SIGNIFICANT CHANGE UP (ref 80–100)
MONOCYTES # BLD AUTO: 0.57 K/UL — SIGNIFICANT CHANGE UP (ref 0–0.9)
MONOCYTES NFR BLD AUTO: 8.2 % — SIGNIFICANT CHANGE UP (ref 2–14)
NEUTROPHILS # BLD AUTO: 5.03 K/UL — SIGNIFICANT CHANGE UP (ref 1.8–7.4)
NEUTROPHILS NFR BLD AUTO: 72.7 % — SIGNIFICANT CHANGE UP (ref 43–77)
NITRITE UR-MCNC: NEGATIVE — SIGNIFICANT CHANGE UP
NRBC # BLD: 0 /100 WBCS — SIGNIFICANT CHANGE UP (ref 0–0)
PH UR: 6 — SIGNIFICANT CHANGE UP (ref 5–8)
PLATELET # BLD AUTO: 337 K/UL — SIGNIFICANT CHANGE UP (ref 150–400)
POTASSIUM SERPL-MCNC: 4 MMOL/L — SIGNIFICANT CHANGE UP (ref 3.5–5.3)
POTASSIUM SERPL-SCNC: 4 MMOL/L — SIGNIFICANT CHANGE UP (ref 3.5–5.3)
PROT SERPL-MCNC: 7.7 GM/DL — SIGNIFICANT CHANGE UP (ref 6–8.3)
PROT UR-MCNC: 15 MG/DL
PROTHROM AB SERPL-ACNC: 11 SEC — SIGNIFICANT CHANGE UP (ref 10.5–13.4)
RBC # BLD: 5.03 M/UL — SIGNIFICANT CHANGE UP (ref 3.8–5.2)
RBC # FLD: 13.9 % — SIGNIFICANT CHANGE UP (ref 10.3–14.5)
RBC CASTS # UR COMP ASSIST: SIGNIFICANT CHANGE UP /HPF (ref 0–4)
SODIUM SERPL-SCNC: 141 MMOL/L — SIGNIFICANT CHANGE UP (ref 135–145)
SP GR SPEC: 1.02 — SIGNIFICANT CHANGE UP (ref 1.01–1.02)
UROBILINOGEN FLD QL: NEGATIVE MG/DL — SIGNIFICANT CHANGE UP
VIT D25+D1,25 OH+D1,25 PNL SERPL-MCNC: 38 PG/ML — SIGNIFICANT CHANGE UP (ref 19.9–79.3)
WBC # BLD: 6.93 K/UL — SIGNIFICANT CHANGE UP (ref 3.8–10.5)
WBC # FLD AUTO: 6.93 K/UL — SIGNIFICANT CHANGE UP (ref 3.8–10.5)
WBC UR QL: SIGNIFICANT CHANGE UP

## 2022-12-29 PROCEDURE — 93010 ELECTROCARDIOGRAM REPORT: CPT

## 2022-12-29 NOTE — H&P PST ADULT - NSICDXPASTSURGICALHX_GEN_ALL_CORE_FT
PAST SURGICAL HISTORY:  Ectopic Pregnancy 2001     S/P hammer toe correction 2015,2016,2017    S/P knee replacement left 12/13/21    S/P Lumpectomy of Breast left  benign 1985     S/P rotator cuff repair left 2013     PAST SURGICAL HISTORY:  Ectopic Pregnancy 2001     S/P hammer toe correction 2015,2016,2017    S/P knee replacement left 12/13/21    S/P laminectomy with spinal fusion     S/P Lumpectomy of Breast left  benign 1985     S/P rotator cuff repair left 2013

## 2022-12-29 NOTE — H&P PST ADULT - ASSESSMENT
61 y/o F PMH Allergic Rhinitis, Asthma, HTN, HLD, GERD, CRYSTAL- on oral device/ not on CPAP, presents to PST for scheduled revision of left total knee arthroplasty on 23 with .     CAPRINI SCORE [CLOT]    AGE RELATED RISK FACTORS                                                       MOBILITY RELATED FACTORS  [ ] Age 41-60 years                                            (1 Point)                  [ ] Bed rest                                                        (1 Point)  [x ] Age: 61-74 years                                           (2 Points)                 [ ] Plaster cast                                                   (2 Points)  [ ] Age= 75 years                                              (3 Points)                 [ ] Bed bound for more than 72 hours                 (2 Points)    DISEASE RELATED RISK FACTORS                                               GENDER SPECIFIC FACTORS  [ ] Edema in the lower extremities                       (1 Point)                  [ ] Pregnancy                                                     (1 Point)  [ ] Varicose veins                                               (1 Point)                  [ ] Post-partum < 6 weeks                                   (1 Point)             [ ] BMI > 25 Kg/m2                                            (1 Point)                  [ ] Hormonal therapy  or oral contraception          (1 Point)                 [ ] Sepsis (in the previous month)                        (1 Point)                  [ ] History of pregnancy complications                 (1 point)  [ ] Pneumonia or serious lung disease                                               [ ] Unexplained or recurrent                     (1 Point)           (in the previous month)                               (1 Point)  [ ] Abnormal pulmonary function test                     (1 Point)                 SURGERY RELATED RISK FACTORS  [ ] Acute myocardial infarction                              (1 Point)                 [ ]  Section                                             (1 Point)  [ ] Congestive heart failure (in the previous month)  (1 Point)               [ ] Minor surgery                                                  (1 Point)   [ ] Inflammatory bowel disease                             (1 Point)                 [ ] Arthroscopic surgery                                        (2 Points)  [ ] Central venous access                                      (2 Points)                [ ] General surgery lasting more than 45 minutes   (2 Points)       [ ] Stroke (in the previous month)                          (5 Points)               [x ] Elective arthroplasty                                         (5 Points)                                                                                                                                               HEMATOLOGY RELATED FACTORS                                                 TRAUMA RELATED RISK FACTORS  [ ] Prior episodes of VTE                                     (3 Points)                [ ] Fracture of the hip, pelvis, or leg                       (5 Points)  [ ] Positive family history for VTE                         (3 Points)                 [ ] Acute spinal cord injury (in the previous month)  (5 Points)  [ ] Prothrombin 95065 A                                     (3 Points)                 [ ] Paralysis  (less than 1 month)                             (5 Points)  [ ] Factor V Leiden                                             (3 Points)                  [ ] Multiple Trauma within 1 month                        (5 Points)  [ ] Lupus anticoagulants                                     (3 Points)                                                           [ ] Anticardiolipin antibodies                               (3 Points)                                                       [ ] High homocysteine in the blood                      (3 Points)                                             [ ] Other congenital or acquired thrombophilia      (3 Points)                                                [ ] Heparin induced thrombocytopenia                  (3 Points)                                          Total Score [     7     ]    Caprini Score 0 - 2:  Low Risk, No VTE Prophylaxis required for most patients, encourage ambulation  Caprini Score 3 - 6:  At Risk, pharmacologic VTE prophylaxis is indicated for most patients (in the absence of a contraindication)  Caprini Score Greater than or = 7:  High Risk, pharmacologic VTE prophylaxis is indicated for most patients (in the absence of a contraindication)

## 2022-12-29 NOTE — H&P PST ADULT - HISTORY OF PRESENT ILLNESS
61 year old female with PMH of Allergic Rhinitis, Asthma well controlled on inhalers, Spinal stenosis- s/p L4-L5 fusion and laminectomy, scoliosis, GERD, CRYSTAL- on oral device/ not on CPAP, presents to PST with pre op diagnosis of Carpal tunnel syndrome Right upper limb, for preop evaluation prior to scheduled surgery- Right endoscopic carpal tunnel release. 63 y/o F PMH Allergic Rhinitis, Asthma, HTN, HLD, GERD, CRYSTAL- on oral device/ not on CPAP, presents to PST for scheduled revision of left total knee arthroplasty on 1/18/23 with .     This patient denies any fever, cough, sob, flu like symptoms or travel outside of the [place] in the past 30 days     goal: to walk without pain and swelling in left knee

## 2022-12-29 NOTE — H&P PST ADULT - PROBLEM SELECTOR PLAN 1
Labs-CBC, BMP, PT/INR, PTT ,T&S, Nose Cx, EKG   medical/pulmonary clearance required    Preop Hibiclens x 3 day instructions reviewed and given. Instructed on if Cx is positive use Mupirocin 5 days and checklist given in booklet   Take routine meds DOS with small sips of water, avoid NSAIDs and OTC supplements, verbalized understanding information on proper nutrition, increase protein and better food choices provided in booklet.    Ensure clear given  Pt aware COVID-19 PCR test needed 3-5 days prior to surgery   Anesthesiologist to review PST labs, EKG, required clearances, and optimization for surgery

## 2022-12-29 NOTE — OCCUPATIONAL THERAPY INITIAL EVALUATION ADULT - ADDITIONAL COMMENTS
Pt reports that he is going to stay at sisters house post op. Pt reports that her sisters house has a garage entrance with no steps. Once inside, the pt reports that she is going to stay on that main floor when entering which has a full bathroom and bedroom. The pt bathroom has a walk in shower stall, fixed/retractable shower head, standard toilet seat and 1x grab bar. The pt reports that she has a 3/1 commode at home. The pt ambulates with no device and owns a rolling walker and a cane. The pt daily pain is a 0/10 at rest and a 10/10 with movement. The pt manages the pain with rest, THC gummy and Klonopin. The pt reports that she is allergic to oxycodone. The pt recently had outpatient PT 2 months ago, hx of falls (Fractured R wrist 1 year ago) and has buckling of the knees. The pt wears glasses all the time, L handed, drives and has bilateral hearing aids (Tinnitus).

## 2022-12-30 LAB
CULTURE RESULTS: NO GROWTH — SIGNIFICANT CHANGE UP
MRSA PCR RESULT.: SIGNIFICANT CHANGE UP
S AUREUS DNA NOSE QL NAA+PROBE: SIGNIFICANT CHANGE UP
SPECIMEN SOURCE: SIGNIFICANT CHANGE UP

## 2023-01-04 ENCOUNTER — APPOINTMENT (OUTPATIENT)
Dept: ORTHOPEDIC SURGERY | Facility: CLINIC | Age: 63
End: 2023-01-04

## 2023-01-05 RX ORDER — POLYETHYLENE GLYCOL 3350 17 G/17G
17 POWDER, FOR SOLUTION ORAL AT BEDTIME
Refills: 0 | Status: DISCONTINUED | OUTPATIENT
Start: 2023-01-17 | End: 2023-01-19

## 2023-01-05 RX ORDER — ONDANSETRON 8 MG/1
4 TABLET, FILM COATED ORAL EVERY 6 HOURS
Refills: 0 | Status: DISCONTINUED | OUTPATIENT
Start: 2023-01-17 | End: 2023-01-19

## 2023-01-05 RX ORDER — LOSARTAN POTASSIUM 100 MG/1
50 TABLET, FILM COATED ORAL DAILY
Refills: 0 | Status: DISCONTINUED | OUTPATIENT
Start: 2023-01-17 | End: 2023-01-19

## 2023-01-05 RX ORDER — FOLIC ACID 0.8 MG
1 TABLET ORAL DAILY
Refills: 0 | Status: DISCONTINUED | OUTPATIENT
Start: 2023-01-17 | End: 2023-01-19

## 2023-01-05 RX ORDER — ACETAMINOPHEN 500 MG
975 TABLET ORAL EVERY 8 HOURS
Refills: 0 | Status: DISCONTINUED | OUTPATIENT
Start: 2023-01-17 | End: 2023-01-19

## 2023-01-05 RX ORDER — PANTOPRAZOLE SODIUM 20 MG/1
40 TABLET, DELAYED RELEASE ORAL
Refills: 0 | Status: DISCONTINUED | OUTPATIENT
Start: 2023-01-17 | End: 2023-01-19

## 2023-01-05 RX ORDER — ENOXAPARIN SODIUM 100 MG/ML
40 INJECTION SUBCUTANEOUS EVERY 24 HOURS
Refills: 0 | Status: DISCONTINUED | OUTPATIENT
Start: 2023-01-18 | End: 2023-01-19

## 2023-01-05 RX ORDER — MAGNESIUM HYDROXIDE 400 MG/1
30 TABLET, CHEWABLE ORAL DAILY
Refills: 0 | Status: DISCONTINUED | OUTPATIENT
Start: 2023-01-17 | End: 2023-01-19

## 2023-01-05 RX ORDER — TRAMADOL HYDROCHLORIDE 50 MG/1
50 TABLET ORAL EVERY 6 HOURS
Refills: 0 | Status: DISCONTINUED | OUTPATIENT
Start: 2023-01-17 | End: 2023-01-19

## 2023-01-05 RX ORDER — HYDROMORPHONE HYDROCHLORIDE 2 MG/ML
0.5 INJECTION INTRAMUSCULAR; INTRAVENOUS; SUBCUTANEOUS ONCE
Refills: 0 | Status: DISCONTINUED | OUTPATIENT
Start: 2023-01-17 | End: 2023-01-18

## 2023-01-05 RX ORDER — SENNA PLUS 8.6 MG/1
2 TABLET ORAL AT BEDTIME
Refills: 0 | Status: DISCONTINUED | OUTPATIENT
Start: 2023-01-17 | End: 2023-01-19

## 2023-01-05 RX ORDER — ATORVASTATIN CALCIUM 80 MG/1
10 TABLET, FILM COATED ORAL AT BEDTIME
Refills: 0 | Status: DISCONTINUED | OUTPATIENT
Start: 2023-01-17 | End: 2023-01-19

## 2023-01-05 RX ORDER — CELECOXIB 200 MG/1
200 CAPSULE ORAL EVERY 12 HOURS
Refills: 0 | Status: DISCONTINUED | OUTPATIENT
Start: 2023-01-18 | End: 2023-01-19

## 2023-01-05 RX ORDER — BUPROPION HYDROCHLORIDE 150 MG/1
150 TABLET, EXTENDED RELEASE ORAL DAILY
Refills: 0 | Status: DISCONTINUED | OUTPATIENT
Start: 2023-01-17 | End: 2023-01-19

## 2023-01-10 ENCOUNTER — RX RENEWAL (OUTPATIENT)
Age: 63
End: 2023-01-10

## 2023-01-10 RX ORDER — ALBUTEROL SULFATE 90 UG/1
108 (90 BASE) INHALANT RESPIRATORY (INHALATION)
Qty: 3 | Refills: 1 | Status: ACTIVE | COMMUNITY
Start: 2020-04-07 | End: 1900-01-01

## 2023-01-11 RX ORDER — CLONAZEPAM 1 MG
0.5 TABLET ORAL DAILY
Refills: 0 | Status: DISCONTINUED | OUTPATIENT
Start: 2023-01-17 | End: 2023-01-19

## 2023-01-12 ENCOUNTER — NON-APPOINTMENT (OUTPATIENT)
Age: 63
End: 2023-01-12

## 2023-01-16 ENCOUNTER — FORM ENCOUNTER (OUTPATIENT)
Age: 63
End: 2023-01-16

## 2023-01-17 ENCOUNTER — APPOINTMENT (OUTPATIENT)
Dept: ORTHOPEDIC SURGERY | Facility: HOSPITAL | Age: 63
End: 2023-01-17

## 2023-01-17 ENCOUNTER — RESULT REVIEW (OUTPATIENT)
Age: 63
End: 2023-01-17

## 2023-01-17 ENCOUNTER — TRANSCRIPTION ENCOUNTER (OUTPATIENT)
Age: 63
End: 2023-01-17

## 2023-01-17 ENCOUNTER — INPATIENT (INPATIENT)
Facility: HOSPITAL | Age: 63
LOS: 1 days | Discharge: HOME HEALTH | End: 2023-01-19
Attending: ORTHOPAEDIC SURGERY | Admitting: ORTHOPAEDIC SURGERY
Payer: COMMERCIAL

## 2023-01-17 VITALS
TEMPERATURE: 99 F | DIASTOLIC BLOOD PRESSURE: 77 MMHG | HEART RATE: 98 BPM | SYSTOLIC BLOOD PRESSURE: 116 MMHG | RESPIRATION RATE: 18 BRPM | WEIGHT: 145.95 LBS | OXYGEN SATURATION: 98 % | HEIGHT: 62 IN

## 2023-01-17 DIAGNOSIS — Z96.659 PRESENCE OF UNSPECIFIED ARTIFICIAL KNEE JOINT: Chronic | ICD-10-CM

## 2023-01-17 DIAGNOSIS — Z98.890 OTHER SPECIFIED POSTPROCEDURAL STATES: Chronic | ICD-10-CM

## 2023-01-17 DIAGNOSIS — Z98.1 ARTHRODESIS STATUS: Chronic | ICD-10-CM

## 2023-01-17 LAB
ANION GAP SERPL CALC-SCNC: 9 MMOL/L — SIGNIFICANT CHANGE UP (ref 5–17)
BLD GP AB SCN SERPL QL: SIGNIFICANT CHANGE UP
BUN SERPL-MCNC: 17 MG/DL — SIGNIFICANT CHANGE UP (ref 7–23)
CALCIUM SERPL-MCNC: 8.7 MG/DL — SIGNIFICANT CHANGE UP (ref 8.5–10.1)
CHLORIDE SERPL-SCNC: 104 MMOL/L — SIGNIFICANT CHANGE UP (ref 96–108)
CO2 SERPL-SCNC: 23 MMOL/L — SIGNIFICANT CHANGE UP (ref 22–31)
CREAT SERPL-MCNC: 0.95 MG/DL — SIGNIFICANT CHANGE UP (ref 0.5–1.3)
EGFR: 67 ML/MIN/1.73M2 — SIGNIFICANT CHANGE UP
GLUCOSE SERPL-MCNC: 161 MG/DL — HIGH (ref 70–99)
HCT VFR BLD CALC: 39.6 % — SIGNIFICANT CHANGE UP (ref 34.5–45)
HGB BLD-MCNC: 13.1 G/DL — SIGNIFICANT CHANGE UP (ref 11.5–15.5)
MCHC RBC-ENTMCNC: 29.6 PG — SIGNIFICANT CHANGE UP (ref 27–34)
MCHC RBC-ENTMCNC: 33.1 G/DL — SIGNIFICANT CHANGE UP (ref 32–36)
MCV RBC AUTO: 89.6 FL — SIGNIFICANT CHANGE UP (ref 80–100)
NRBC # BLD: 0 /100 WBCS — SIGNIFICANT CHANGE UP (ref 0–0)
PLATELET # BLD AUTO: 315 K/UL — SIGNIFICANT CHANGE UP (ref 150–400)
POTASSIUM SERPL-MCNC: 3.6 MMOL/L — SIGNIFICANT CHANGE UP (ref 3.5–5.3)
POTASSIUM SERPL-SCNC: 3.6 MMOL/L — SIGNIFICANT CHANGE UP (ref 3.5–5.3)
RBC # BLD: 4.42 M/UL — SIGNIFICANT CHANGE UP (ref 3.8–5.2)
RBC # FLD: 13.9 % — SIGNIFICANT CHANGE UP (ref 10.3–14.5)
SODIUM SERPL-SCNC: 136 MMOL/L — SIGNIFICANT CHANGE UP (ref 135–145)
WBC # BLD: 16.49 K/UL — HIGH (ref 3.8–10.5)
WBC # FLD AUTO: 16.49 K/UL — HIGH (ref 3.8–10.5)

## 2023-01-17 PROCEDURE — 88300 SURGICAL PATH GROSS: CPT | Mod: 26,59

## 2023-01-17 PROCEDURE — 88305 TISSUE EXAM BY PATHOLOGIST: CPT | Mod: 26

## 2023-01-17 PROCEDURE — 88331 PATH CONSLTJ SURG 1 BLK 1SPC: CPT | Mod: 26

## 2023-01-17 PROCEDURE — 73560 X-RAY EXAM OF KNEE 1 OR 2: CPT | Mod: 26,LT

## 2023-01-17 PROCEDURE — 27487 REVISE/REPLACE KNEE JOINT: CPT | Mod: LT

## 2023-01-17 DEVICE — IMPLANTABLE DEVICE: Type: IMPLANTABLE DEVICE | Site: LEFT | Status: FUNCTIONAL

## 2023-01-17 DEVICE — COMP FEM PSN REV CEM CCR STD SZ 5 LT: Type: IMPLANTABLE DEVICE | Site: LEFT | Status: FUNCTIONAL

## 2023-01-17 DEVICE — COMP TIB PSN REV FIXED KEEL SZ C LT: Type: IMPLANTABLE DEVICE | Site: LEFT | Status: FUNCTIONAL

## 2023-01-17 DEVICE — CONE TIB PSN REV TM CENTRAL: Type: IMPLANTABLE DEVICE | Site: LEFT | Status: FUNCTIONAL

## 2023-01-17 RX ORDER — ASPIRIN/CALCIUM CARB/MAGNESIUM 324 MG
1 TABLET ORAL
Qty: 60 | Refills: 0
Start: 2023-01-17 | End: 2023-02-15

## 2023-01-17 RX ORDER — CELECOXIB 200 MG/1
200 CAPSULE ORAL ONCE
Refills: 0 | Status: COMPLETED | OUTPATIENT
Start: 2023-01-17 | End: 2023-01-17

## 2023-01-17 RX ORDER — METOCLOPRAMIDE HCL 10 MG
10 TABLET ORAL ONCE
Refills: 0 | Status: COMPLETED | OUTPATIENT
Start: 2023-01-17 | End: 2023-01-17

## 2023-01-17 RX ORDER — SODIUM CHLORIDE 9 MG/ML
1000 INJECTION, SOLUTION INTRAVENOUS
Refills: 0 | Status: DISCONTINUED | OUTPATIENT
Start: 2023-01-17 | End: 2023-01-17

## 2023-01-17 RX ORDER — ONDANSETRON 8 MG/1
4 TABLET, FILM COATED ORAL ONCE
Refills: 0 | Status: COMPLETED | OUTPATIENT
Start: 2023-01-17 | End: 2023-01-17

## 2023-01-17 RX ORDER — DICLOFENAC SODIUM 75 MG/1
1 TABLET, DELAYED RELEASE ORAL
Qty: 0 | Refills: 0 | DISCHARGE

## 2023-01-17 RX ORDER — OXYCODONE HYDROCHLORIDE 5 MG/1
1 TABLET ORAL
Qty: 40 | Refills: 0
Start: 2023-01-17

## 2023-01-17 RX ORDER — OXYCODONE HYDROCHLORIDE 5 MG/1
10 TABLET ORAL
Refills: 0 | Status: DISCONTINUED | OUTPATIENT
Start: 2023-01-17 | End: 2023-01-18

## 2023-01-17 RX ORDER — ONDANSETRON 8 MG/1
1 TABLET, FILM COATED ORAL
Qty: 28 | Refills: 0
Start: 2023-01-17 | End: 2023-01-23

## 2023-01-17 RX ORDER — ENOXAPARIN SODIUM 100 MG/ML
1 INJECTION SUBCUTANEOUS
Qty: 14 | Refills: 0
Start: 2023-01-17 | End: 2023-01-30

## 2023-01-17 RX ORDER — OLMESARTAN MEDOXOMIL-HYDROCHLOROTHIAZIDE 25; 40 MG/1; MG/1
1 TABLET, FILM COATED ORAL
Qty: 0 | Refills: 0 | DISCHARGE

## 2023-01-17 RX ORDER — SODIUM CHLORIDE 9 MG/ML
3 INJECTION INTRAMUSCULAR; INTRAVENOUS; SUBCUTANEOUS EVERY 8 HOURS
Refills: 0 | Status: DISCONTINUED | OUTPATIENT
Start: 2023-01-17 | End: 2023-01-17

## 2023-01-17 RX ORDER — IBUPROFEN 200 MG
1 TABLET ORAL
Qty: 0 | Refills: 0 | DISCHARGE

## 2023-01-17 RX ORDER — ACETAMINOPHEN 500 MG
650 TABLET ORAL ONCE
Refills: 0 | Status: COMPLETED | OUTPATIENT
Start: 2023-01-17 | End: 2023-01-17

## 2023-01-17 RX ORDER — HYDROMORPHONE HYDROCHLORIDE 2 MG/ML
0.5 INJECTION INTRAMUSCULAR; INTRAVENOUS; SUBCUTANEOUS
Refills: 0 | Status: DISCONTINUED | OUTPATIENT
Start: 2023-01-17 | End: 2023-01-17

## 2023-01-17 RX ORDER — SODIUM CHLORIDE 9 MG/ML
1000 INJECTION, SOLUTION INTRAVENOUS
Refills: 0 | Status: DISCONTINUED | OUTPATIENT
Start: 2023-01-17 | End: 2023-01-18

## 2023-01-17 RX ORDER — ACETAMINOPHEN 500 MG
1000 TABLET ORAL ONCE
Refills: 0 | Status: COMPLETED | OUTPATIENT
Start: 2023-01-17 | End: 2023-01-17

## 2023-01-17 RX ORDER — CEFAZOLIN SODIUM 1 G
2000 VIAL (EA) INJECTION EVERY 8 HOURS
Refills: 0 | Status: COMPLETED | OUTPATIENT
Start: 2023-01-17 | End: 2023-01-18

## 2023-01-17 RX ORDER — OXYCODONE HYDROCHLORIDE 5 MG/1
5 TABLET ORAL
Refills: 0 | Status: DISCONTINUED | OUTPATIENT
Start: 2023-01-17 | End: 2023-01-18

## 2023-01-17 RX ADMIN — HYDROMORPHONE HYDROCHLORIDE 0.5 MILLIGRAM(S): 2 INJECTION INTRAMUSCULAR; INTRAVENOUS; SUBCUTANEOUS at 16:41

## 2023-01-17 RX ADMIN — HYDROMORPHONE HYDROCHLORIDE 0.5 MILLIGRAM(S): 2 INJECTION INTRAMUSCULAR; INTRAVENOUS; SUBCUTANEOUS at 17:15

## 2023-01-17 RX ADMIN — CELECOXIB 200 MILLIGRAM(S): 200 CAPSULE ORAL at 11:54

## 2023-01-17 RX ADMIN — Medication 10 MILLIGRAM(S): at 18:33

## 2023-01-17 RX ADMIN — ONDANSETRON 4 MILLIGRAM(S): 8 TABLET, FILM COATED ORAL at 23:34

## 2023-01-17 RX ADMIN — SODIUM CHLORIDE 75 MILLILITER(S): 9 INJECTION, SOLUTION INTRAVENOUS at 16:42

## 2023-01-17 RX ADMIN — HYDROMORPHONE HYDROCHLORIDE 0.5 MILLIGRAM(S): 2 INJECTION INTRAMUSCULAR; INTRAVENOUS; SUBCUTANEOUS at 17:00

## 2023-01-17 RX ADMIN — Medication 650 MILLIGRAM(S): at 11:54

## 2023-01-17 RX ADMIN — Medication 400 MILLIGRAM(S): at 23:34

## 2023-01-17 RX ADMIN — SODIUM CHLORIDE 75 MILLILITER(S): 9 INJECTION, SOLUTION INTRAVENOUS at 18:33

## 2023-01-17 RX ADMIN — HYDROMORPHONE HYDROCHLORIDE 0.5 MILLIGRAM(S): 2 INJECTION INTRAMUSCULAR; INTRAVENOUS; SUBCUTANEOUS at 17:30

## 2023-01-17 RX ADMIN — HYDROMORPHONE HYDROCHLORIDE 0.5 MILLIGRAM(S): 2 INJECTION INTRAMUSCULAR; INTRAVENOUS; SUBCUTANEOUS at 17:38

## 2023-01-17 RX ADMIN — Medication 100 MILLIGRAM(S): at 21:24

## 2023-01-17 RX ADMIN — ONDANSETRON 4 MILLIGRAM(S): 8 TABLET, FILM COATED ORAL at 16:42

## 2023-01-17 NOTE — DISCHARGE NOTE PROVIDER - NSDCFUADDINST_GEN_ALL_CORE_FT
1.	Pain Control  2.	Walking with full weight bearing as tolerated, with assistive devices (walker/Cane as Needed)  3.	DVT Prophylaxis, Lovenox 40mg subcutaneous daily for 14 days. Day 15 start Aspirin 81 mg twice a day for 30 days. do not skip doses.  4.	PT as needed  5.	Please call the office and make an appointment for staple removal in 10-14 days. [phone]  6.	Remove Dressing Post-Op Day 7, with Dry dressing and Daily Dressing Changes as Need.  7.	Ice/Elevate affected area as Needed  8.	Keep Dressing Clean and dry.

## 2023-01-17 NOTE — DISCHARGE NOTE PROVIDER - HOSPITAL COURSE
Hospital Course:  The patient is a 62y Female status post elective left Revision total knee arthroplasty after failing outpatient nonoperative conservative management. Patient presented to [facility] after being medically cleared for an elective surgical procedure. The patient was taken to the operating room on date mentioned above. Prophylactic antibiotics were started before the procedure and continued for 24 hours. There were no complications during the procedure and patient tolerated the procedure well. The patient was transferred to the recovery room in stable condition and subsequently to the surgical floor. The patient was placed on medication for DVT prophylaxis. All home medications were continued. The patient received physical therapy daily and daily labs were followed. The hemovac drain was DC'd on POD #1. The dressing was kept clean, dry, intact. The rest of the hospital stay was unremarkable.

## 2023-01-17 NOTE — PATIENT PROFILE ADULT - CAREGIVER RELATION TO PATIENT
Left message to return call.    Orders have been sent to [facility] Scheduling for surgery date of 5/24/22 at 730a as they will not hold surgery dates.  Can cancel if this date does not work with patients schedule.   daughter

## 2023-01-17 NOTE — DISCHARGE NOTE PROVIDER - NSDCCPTREATMENT_GEN_ALL_CORE_FT
PRINCIPAL PROCEDURE  Procedure: Left total knee arthroplasty  Findings and Treatment: Revision

## 2023-01-17 NOTE — DISCHARGE NOTE PROVIDER - NSDCFUSCHEDAPPT_GEN_ALL_CORE_FT
Stephan Salvador  [facility]  ORTHOSURG [address]  Scheduled Appointment: 02/08/2023    Eileen Saucedo  [facility]  OTOLARYNG [address] [facility]  Scheduled Appointment: 03/01/2023    Mathew Rao  [facility]  PULMMED [address] [place] [address]  Scheduled Appointment: 03/13/2023

## 2023-01-17 NOTE — DISCHARGE NOTE PROVIDER - NSDCMRMEDTOKEN_GEN_ALL_CORE_FT
albuterol 90 mcg/inh inhalation powder: 2 puff(s) inhaled every 6 hours, As Needed  atorvastatin 10 mg oral tablet: 1 tab(s) orally once a day  buPROPion 150 mg/24 hours (XL) oral tablet, extended release: 1 tab(s) orally every 24 hours  clonazePAM 0.5 mg oral tablet: 1 tab(s) orally , As Needed  diclofenac 35 mg oral capsule: 1 cap(s) orally 3 times a day, As Needed  lubiprostone 8 mcg oral capsule: 1 cap(s) orally 2 times a day  Medical Marijuana:   Motrin: 1 tab(s) orally , As Needed/ LD on 02/20/22  olmesartan-hydrochlorothiazide 20 mg-12.5 mg oral tablet: 1 tab(s) orally once a day  RABEprazole 20 mg oral delayed release tablet: 1 tab(s) orally once a day  Trelegy Ellipta: inhaled once a day  Vitamin D3 125 mcg (5000 intl units) oral capsule: 1 cap(s) orally once a day  zafirlukast 20 mg oral tablet: 1 tab(s) orally 2 times a day   albuterol 90 mcg/inh inhalation powder: 2 puff(s) inhaled every 6 hours, As Needed  Aspirin Enteric Coated 81 mg oral delayed release tablet: 1 tab(s) orally 2 times a day   Take from POD 15-45 MDD:2 Tablets  atorvastatin 10 mg oral tablet: 1 tab(s) orally once a day  buPROPion 150 mg/24 hours (XL) oral tablet, extended release: 1 tab(s) orally every 24 hours  clonazePAM 0.5 mg oral tablet: 1 tab(s) orally , As Needed  enoxaparin 40 mg/0.4 mL injectable solution: 1 unit(s) subcutaneously once a day   lubiprostone 8 mcg oral capsule: 1 cap(s) orally 2 times a day  Medical Marijuana:   olmesartan-hydrochlorothiazide 20 mg-12.5 mg oral tablet: 1 tab(s) orally once a day  ondansetron 4 mg oral tablet: 1 tab(s) orally every 6 hours, As Needed -for nausea   oxyCODONE 5 mg oral tablet: 1 tab(s) orally every 6 hours, As Needed MDD:MDD 8 Tablets  RABEprazole 20 mg oral delayed release tablet: 1 tab(s) orally once a day  Trelegy Ellipta: inhaled once a day  Vitamin D3 125 mcg (5000 intl units) oral capsule: 1 cap(s) orally once a day  zafirlukast 20 mg oral tablet: 1 tab(s) orally 2 times a day

## 2023-01-17 NOTE — DISCHARGE NOTE PROVIDER - CARE PROVIDER_API CALL
Stephan Salvador)  Orthopaedic Surgery  [address] [place] [address]  Phone: [phone]  Fax: [phone]  Follow Up Time:

## 2023-01-17 NOTE — ASU PREOP CHECKLIST - MEDICAL/PEDIATRIC CLEARANCE ON MEDICAL RECORD
Recurrent Pyelonephritis  Since around 2014 she has had multiple episodes of pyelonephritis.  She was seeing Dr. Luther at  who did a cystoscopy and per patient report did not find anything definitive.  Her symptoms include flank pain (left usually), malaise, and fever at times.  She normally does not have dysuria but current she has dysuria and frequency and urgency.  She knows about her small left kidney stone but has never required a procedure for stones.    She underwent ureteroscopy on 1/8/2018.  She had a narrow ureter and ureteroscopy could not reach the kidney.   A stent was placed with planned repeat ureteroscopy later this month.  She has not been able to tolerate the stent.  She has been taking oxybutynin and Norco but she continues with significant left flank and abdominal pain.  
yes

## 2023-01-17 NOTE — DISCHARGE NOTE PROVIDER - NSDCCPCAREPLAN_GEN_ALL_CORE_FT
PRINCIPAL DISCHARGE DIAGNOSIS  Diagnosis: Knee internal derangement, left  Assessment and Plan of Treatment:

## 2023-01-18 ENCOUNTER — TRANSCRIPTION ENCOUNTER (OUTPATIENT)
Age: 63
End: 2023-01-18

## 2023-01-18 LAB
ANION GAP SERPL CALC-SCNC: 6 MMOL/L — SIGNIFICANT CHANGE UP (ref 5–17)
BUN SERPL-MCNC: 14 MG/DL — SIGNIFICANT CHANGE UP (ref 7–23)
CALCIUM SERPL-MCNC: 8.3 MG/DL — LOW (ref 8.5–10.1)
CHLORIDE SERPL-SCNC: 104 MMOL/L — SIGNIFICANT CHANGE UP (ref 96–108)
CO2 SERPL-SCNC: 29 MMOL/L — SIGNIFICANT CHANGE UP (ref 22–31)
CREAT SERPL-MCNC: 0.9 MG/DL — SIGNIFICANT CHANGE UP (ref 0.5–1.3)
EGFR: 71 ML/MIN/1.73M2 — SIGNIFICANT CHANGE UP
GLUCOSE SERPL-MCNC: 129 MG/DL — HIGH (ref 70–99)
GRAM STN FLD: SIGNIFICANT CHANGE UP
HCT VFR BLD CALC: 35.5 % — SIGNIFICANT CHANGE UP (ref 34.5–45)
HGB BLD-MCNC: 11.4 G/DL — LOW (ref 11.5–15.5)
MCHC RBC-ENTMCNC: 29.5 PG — SIGNIFICANT CHANGE UP (ref 27–34)
MCHC RBC-ENTMCNC: 32.1 G/DL — SIGNIFICANT CHANGE UP (ref 32–36)
MCV RBC AUTO: 92 FL — SIGNIFICANT CHANGE UP (ref 80–100)
NIGHT BLUE STAIN TISS: SIGNIFICANT CHANGE UP
NRBC # BLD: 0 /100 WBCS — SIGNIFICANT CHANGE UP (ref 0–0)
PLATELET # BLD AUTO: 283 K/UL — SIGNIFICANT CHANGE UP (ref 150–400)
POTASSIUM SERPL-MCNC: 3.8 MMOL/L — SIGNIFICANT CHANGE UP (ref 3.5–5.3)
POTASSIUM SERPL-SCNC: 3.8 MMOL/L — SIGNIFICANT CHANGE UP (ref 3.5–5.3)
RBC # BLD: 3.86 M/UL — SIGNIFICANT CHANGE UP (ref 3.8–5.2)
RBC # FLD: 14 % — SIGNIFICANT CHANGE UP (ref 10.3–14.5)
SODIUM SERPL-SCNC: 139 MMOL/L — SIGNIFICANT CHANGE UP (ref 135–145)
SPECIMEN SOURCE: SIGNIFICANT CHANGE UP
SPECIMEN SOURCE: SIGNIFICANT CHANGE UP
WBC # BLD: 11.58 K/UL — HIGH (ref 3.8–10.5)
WBC # FLD AUTO: 11.58 K/UL — HIGH (ref 3.8–10.5)

## 2023-01-18 RX ORDER — ACETAMINOPHEN 500 MG
1000 TABLET ORAL ONCE
Refills: 0 | Status: COMPLETED | OUTPATIENT
Start: 2023-01-18 | End: 2023-01-18

## 2023-01-18 RX ORDER — BENZOCAINE AND MENTHOL 5; 1 G/100ML; G/100ML
1 LIQUID ORAL EVERY 4 HOURS
Refills: 0 | Status: DISCONTINUED | OUTPATIENT
Start: 2023-01-18 | End: 2023-01-19

## 2023-01-18 RX ORDER — HYDROMORPHONE HYDROCHLORIDE 2 MG/ML
2 INJECTION INTRAMUSCULAR; INTRAVENOUS; SUBCUTANEOUS EVERY 6 HOURS
Refills: 0 | Status: DISCONTINUED | OUTPATIENT
Start: 2023-01-18 | End: 2023-01-19

## 2023-01-18 RX ORDER — HYDROMORPHONE HYDROCHLORIDE 2 MG/ML
0.5 INJECTION INTRAMUSCULAR; INTRAVENOUS; SUBCUTANEOUS EVERY 6 HOURS
Refills: 0 | Status: DISCONTINUED | OUTPATIENT
Start: 2023-01-18 | End: 2023-01-19

## 2023-01-18 RX ORDER — KETOROLAC TROMETHAMINE 30 MG/ML
15 SYRINGE (ML) INJECTION ONCE
Refills: 0 | Status: DISCONTINUED | OUTPATIENT
Start: 2023-01-18 | End: 2023-01-18

## 2023-01-18 RX ORDER — SCOPALAMINE 1 MG/3D
1 PATCH, EXTENDED RELEASE TRANSDERMAL ONCE
Refills: 0 | Status: COMPLETED | OUTPATIENT
Start: 2023-01-18 | End: 2023-01-18

## 2023-01-18 RX ORDER — HYDROMORPHONE HYDROCHLORIDE 2 MG/ML
1 INJECTION INTRAMUSCULAR; INTRAVENOUS; SUBCUTANEOUS
Qty: 20 | Refills: 0
Start: 2023-01-18 | End: 2023-01-22

## 2023-01-18 RX ORDER — METOCLOPRAMIDE HCL 10 MG
10 TABLET ORAL DAILY
Refills: 0 | Status: DISCONTINUED | OUTPATIENT
Start: 2023-01-18 | End: 2023-01-18

## 2023-01-18 RX ORDER — METOCLOPRAMIDE HCL 10 MG
10 TABLET ORAL DAILY
Refills: 0 | Status: DISCONTINUED | OUTPATIENT
Start: 2023-01-18 | End: 2023-01-19

## 2023-01-18 RX ORDER — BENZOCAINE AND MENTHOL 5; 1 G/100ML; G/100ML
1 LIQUID ORAL ONCE
Refills: 0 | Status: COMPLETED | OUTPATIENT
Start: 2023-01-18 | End: 2023-01-18

## 2023-01-18 RX ORDER — METOCLOPRAMIDE HCL 10 MG
5 TABLET ORAL ONCE
Refills: 0 | Status: COMPLETED | OUTPATIENT
Start: 2023-01-18 | End: 2023-01-18

## 2023-01-18 RX ORDER — SODIUM CHLORIDE 9 MG/ML
1000 INJECTION, SOLUTION INTRAVENOUS
Refills: 0 | Status: DISCONTINUED | OUTPATIENT
Start: 2023-01-18 | End: 2023-01-19

## 2023-01-18 RX ADMIN — SCOPALAMINE 1 PATCH: 1 PATCH, EXTENDED RELEASE TRANSDERMAL at 20:17

## 2023-01-18 RX ADMIN — Medication 975 MILLIGRAM(S): at 05:29

## 2023-01-18 RX ADMIN — Medication 1000 MILLIGRAM(S): at 15:00

## 2023-01-18 RX ADMIN — Medication 975 MILLIGRAM(S): at 21:35

## 2023-01-18 RX ADMIN — HYDROMORPHONE HYDROCHLORIDE 0.5 MILLIGRAM(S): 2 INJECTION INTRAMUSCULAR; INTRAVENOUS; SUBCUTANEOUS at 17:45

## 2023-01-18 RX ADMIN — Medication 15 MILLIGRAM(S): at 09:06

## 2023-01-18 RX ADMIN — HYDROMORPHONE HYDROCHLORIDE 0.5 MILLIGRAM(S): 2 INJECTION INTRAMUSCULAR; INTRAVENOUS; SUBCUTANEOUS at 06:00

## 2023-01-18 RX ADMIN — BUPROPION HYDROCHLORIDE 150 MILLIGRAM(S): 150 TABLET, EXTENDED RELEASE ORAL at 12:04

## 2023-01-18 RX ADMIN — ENOXAPARIN SODIUM 40 MILLIGRAM(S): 100 INJECTION SUBCUTANEOUS at 07:23

## 2023-01-18 RX ADMIN — CELECOXIB 200 MILLIGRAM(S): 200 CAPSULE ORAL at 06:00

## 2023-01-18 RX ADMIN — TRAMADOL HYDROCHLORIDE 50 MILLIGRAM(S): 50 TABLET ORAL at 13:57

## 2023-01-18 RX ADMIN — HYDROMORPHONE HYDROCHLORIDE 2 MILLIGRAM(S): 2 INJECTION INTRAMUSCULAR; INTRAVENOUS; SUBCUTANEOUS at 10:30

## 2023-01-18 RX ADMIN — TRAMADOL HYDROCHLORIDE 50 MILLIGRAM(S): 50 TABLET ORAL at 15:00

## 2023-01-18 RX ADMIN — Medication 975 MILLIGRAM(S): at 06:00

## 2023-01-18 RX ADMIN — SODIUM CHLORIDE 75 MILLILITER(S): 9 INJECTION, SOLUTION INTRAVENOUS at 05:30

## 2023-01-18 RX ADMIN — HYDROMORPHONE HYDROCHLORIDE 2 MILLIGRAM(S): 2 INJECTION INTRAMUSCULAR; INTRAVENOUS; SUBCUTANEOUS at 11:30

## 2023-01-18 RX ADMIN — HYDROMORPHONE HYDROCHLORIDE 0.5 MILLIGRAM(S): 2 INJECTION INTRAMUSCULAR; INTRAVENOUS; SUBCUTANEOUS at 16:24

## 2023-01-18 RX ADMIN — CELECOXIB 200 MILLIGRAM(S): 200 CAPSULE ORAL at 18:52

## 2023-01-18 RX ADMIN — HYDROMORPHONE HYDROCHLORIDE 0.5 MILLIGRAM(S): 2 INJECTION INTRAMUSCULAR; INTRAVENOUS; SUBCUTANEOUS at 22:42

## 2023-01-18 RX ADMIN — SODIUM CHLORIDE 75 MILLILITER(S): 9 INJECTION, SOLUTION INTRAVENOUS at 13:57

## 2023-01-18 RX ADMIN — ATORVASTATIN CALCIUM 10 MILLIGRAM(S): 80 TABLET, FILM COATED ORAL at 21:36

## 2023-01-18 RX ADMIN — HYDROMORPHONE HYDROCHLORIDE 0.5 MILLIGRAM(S): 2 INJECTION INTRAMUSCULAR; INTRAVENOUS; SUBCUTANEOUS at 05:27

## 2023-01-18 RX ADMIN — BENZOCAINE AND MENTHOL 1 LOZENGE: 5; 1 LIQUID ORAL at 10:35

## 2023-01-18 RX ADMIN — Medication 15 MILLIGRAM(S): at 02:00

## 2023-01-18 RX ADMIN — Medication 1000 MILLIGRAM(S): at 00:00

## 2023-01-18 RX ADMIN — Medication 10 MILLIGRAM(S): at 22:27

## 2023-01-18 RX ADMIN — PANTOPRAZOLE SODIUM 40 MILLIGRAM(S): 20 TABLET, DELAYED RELEASE ORAL at 05:53

## 2023-01-18 RX ADMIN — Medication 100 MILLIGRAM(S): at 05:29

## 2023-01-18 RX ADMIN — HYDROMORPHONE HYDROCHLORIDE 2 MILLIGRAM(S): 2 INJECTION INTRAMUSCULAR; INTRAVENOUS; SUBCUTANEOUS at 21:23

## 2023-01-18 RX ADMIN — ONDANSETRON 4 MILLIGRAM(S): 8 TABLET, FILM COATED ORAL at 13:40

## 2023-01-18 RX ADMIN — Medication 15 MILLIGRAM(S): at 10:00

## 2023-01-18 RX ADMIN — SCOPALAMINE 1 PATCH: 1 PATCH, EXTENDED RELEASE TRANSDERMAL at 01:52

## 2023-01-18 RX ADMIN — Medication 15 MILLIGRAM(S): at 01:39

## 2023-01-18 RX ADMIN — LOSARTAN POTASSIUM 50 MILLIGRAM(S): 100 TABLET, FILM COATED ORAL at 05:28

## 2023-01-18 RX ADMIN — ONDANSETRON 4 MILLIGRAM(S): 8 TABLET, FILM COATED ORAL at 07:23

## 2023-01-18 RX ADMIN — SENNA PLUS 2 TABLET(S): 8.6 TABLET ORAL at 21:35

## 2023-01-18 RX ADMIN — Medication 400 MILLIGRAM(S): at 13:57

## 2023-01-18 RX ADMIN — Medication 5 MILLIGRAM(S): at 10:30

## 2023-01-18 RX ADMIN — POLYETHYLENE GLYCOL 3350 17 GRAM(S): 17 POWDER, FOR SOLUTION ORAL at 21:35

## 2023-01-18 RX ADMIN — HYDROMORPHONE HYDROCHLORIDE 2 MILLIGRAM(S): 2 INJECTION INTRAMUSCULAR; INTRAVENOUS; SUBCUTANEOUS at 20:23

## 2023-01-18 RX ADMIN — CELECOXIB 200 MILLIGRAM(S): 200 CAPSULE ORAL at 17:45

## 2023-01-18 RX ADMIN — Medication 975 MILLIGRAM(S): at 22:35

## 2023-01-18 RX ADMIN — SCOPALAMINE 1 PATCH: 1 PATCH, EXTENDED RELEASE TRANSDERMAL at 07:00

## 2023-01-18 RX ADMIN — BENZOCAINE AND MENTHOL 1 LOZENGE: 5; 1 LIQUID ORAL at 14:25

## 2023-01-18 RX ADMIN — HYDROMORPHONE HYDROCHLORIDE 0.5 MILLIGRAM(S): 2 INJECTION INTRAMUSCULAR; INTRAVENOUS; SUBCUTANEOUS at 22:27

## 2023-01-18 RX ADMIN — SODIUM CHLORIDE 75 MILLILITER(S): 9 INJECTION, SOLUTION INTRAVENOUS at 07:24

## 2023-01-18 RX ADMIN — CELECOXIB 200 MILLIGRAM(S): 200 CAPSULE ORAL at 05:28

## 2023-01-18 NOTE — DISCHARGE NOTE NURSING/CASE MANAGEMENT/SOCIAL WORK - PATIENT PORTAL LINK FT
You can access the FollowMyHealth Patient Portal offered by [facility] by registering at the following website: [email or web address]. By joining [employer]’s FollowMyHealth portal, you will also be able to view your health information using other applications (apps) compatible with our system.

## 2023-01-18 NOTE — OCCUPATIONAL THERAPY INITIAL EVALUATION ADULT - SOCIAL CONCERNS
Pt voiced concerns about her recovery at home. Pt endorsed that her sister will be able to assist her as needed./Complex psychosocial needs/coping issues

## 2023-01-18 NOTE — OCCUPATIONAL THERAPY INITIAL EVALUATION ADULT - NSOTDISCHREC_GEN_A_CORE
to remediate deficit areas in order to achieve functional independent with ADL management and functional mobility/Home OT

## 2023-01-18 NOTE — CONSULT NOTE ADULT - SUBJECTIVE AND OBJECTIVE BOX
Chief Complaint:  Patient is a 62y old  Female who presents with a chief complaint of     HPI: Currently no sob or chest pain or palpitation . No current cough or fever . + nausea with an episode of vomiting . Voiding . + Pain .      Review of Systems:    General:  No wt loss, fevers, chills, night sweats  Eyes:  Good vision, no reported pain  ENT:  No sore throat, pain, runny nose, dysphagia  CV:  No pain, palpitations, hypo/hypertension  Resp:  No dyspnea, cough, tachypnea, wheezing  GI:  No pain, nausea, vomiting, diarrhea, constipation  :  No pain, bleeding, incontinence, nocturia  Muscle:  No pain, weakness  Breast:  No pain, abscess, mass, discharge  Neuro:  No weakness, tingling, memory problems  Psych:  No fatigue, insomnia, mood problems, depression  Endocrine:  No polyuria, polydypsia, cold/heat intolerance  Heme:  No petechiae, ecchymosis, easy bruisability  Skin:  No rash, tattoos, scars, edema    Relevant Family History: DM . Allergy : NKDA       Relevant Social History: Quit smoking .     PMH : Asthma . IBS . Depression . Fibromyalgia . B/L Carpel tunnel . Hyperlipidemia  . Meds : Reviewed .    Physical Exam:      Vital Signs:  Vital Signs Last 24 Hrs  T(C): 36.7 (18 Jan 2023 05:10), Max: 37 (17 Jan 2023 11:00)  T(F): 98 (18 Jan 2023 05:10), Max: 98.6 (17 Jan 2023 11:00)  HR: 97 (18 Jan 2023 05:10) (93 - 104)  BP: 120/73 (18 Jan 2023 05:10) (103/67 - 130/71)  BP(mean): --  RR: 16 (18 Jan 2023 05:10) (8 - 22)  SpO2: 97% (18 Jan 2023 05:10) (91% - 98%)    Parameters below as of 18 Jan 2023 05:10  Patient On (Oxygen Delivery Method): room air      Daily Height in cm: 157.48 (17 Jan 2023 11:00)    Daily   I&O's Summary    17 Jan 2023 07:01  -  18 Jan 2023 07:00  --------------------------------------------------------  IN: 0 mL / OUT: 30 mL / NET: -30 mL        General:  Appears stated age, well-groomed, well-nourished, no distress  HEENT:  NC/AT, patent nares w/ pink mucosa, OP clear w/o lesions, PERRL, EOMI, conjunctivae clear, no thyromegaly, nodules, adenopathy, no JVD  Chest:  Full & symmetric excursion, no increased effort, breath sounds clear  Cardiovascular:  Regular rhythm, S1, S2, no murmur/rub/S3/S4, no carotid/femoral/abdominal bruit, radial/pedal pulses 2+, no edema  Abdomen:  Soft, non-tender, non-distended, normoactive bowel sounds, no HSM  Extremities:  + Pulse . Slight fulness left knee .  Skin:  No rash/erythema/ecchymoses/petechiae/wounds/abscess/warm/dry  Musculoskeletal: Intact .  Neuro/Psych:  Alert, oriented, normal and symmetric strength in UEs, LEs .    Laboratory:                            13.1   16.49 )-----------( 315      ( 17 Jan 2023 16:43 )             39.6     01-17    136  |  104  |  17  ----------------------------<  161<H>  3.6   |  23  |  0.95    Ca    8.7      17 Jan 2023 16:43            CAPILLARY BLOOD GLUCOSE                Imaging:      Assessment: S/P Left knee replacement . Leucocytosis .      Plan: PT/OT . Pain control . DVT prophylaxis . Trend the CBC ? Stressor response .

## 2023-01-18 NOTE — OCCUPATIONAL THERAPY INITIAL EVALUATION ADULT - PERTINENT HX OF CURRENT PROBLEM, REHAB EVAL
Pt is a 62 y/ female admitted for elective surgery for revision of left TKR  with MD Salvador on 1/17/23 due to loose prosthesis OA, pain and DJD. Pt has the initial left TKR on December 13/2021 and was discharged home with rehab service.

## 2023-01-18 NOTE — OCCUPATIONAL THERAPY INITIAL EVALUATION ADULT - LIVES WITH, PROFILE
Pt lives alone , but will be going to her sister's home after she is discharged home.  This residence has no steps from the garage entrance. Pt plans to recuperate on the 1st floor, where there is a bedroom and bathroom . This bathroom has a walk in shower, fixed/ retractable shower head, and standard toilet with adequate space to fit a commode over it.

## 2023-01-18 NOTE — OCCUPATIONAL THERAPY INITIAL EVALUATION ADULT - RANGE OF MOTION EXAMINATION, LOWER EXTREMITY
JESSE SHAW ; 07/13/2021 ; NPP OrthoSurg [address] [facility] AAROM in left knee os 0-40 degrees with pain ./Right LE Active ROM was WNL(within normal limits)/Right LE Passive ROM was WNL (within normal limits)

## 2023-01-18 NOTE — PHYSICAL THERAPY INITIAL EVALUATION ADULT - ADDITIONAL COMMENTS
pt encountered in bed supine with hemovac, bandage dressing LLE knee, aaox4. pt able to amb with RW x 80ft, presents with antalgic gait, able to negotiate 4 x 2 steps with myrna HR and step-to gait. pt educated on proper gait and knee flexion during steps and ambulation. pt assisted back to recliner chair and left comfortably.

## 2023-01-18 NOTE — DISCHARGE NOTE NURSING/CASE MANAGEMENT/SOCIAL WORK - NSDCPEFALRISK_GEN_ALL_CORE
For information on Fall & Injury Prevention, visit: [email or web address] OR  [email or web address] OR  https://www.cdc.gov/steadi/patient.html

## 2023-01-18 NOTE — OCCUPATIONAL THERAPY INITIAL EVALUATION ADULT - ADDITIONAL COMMENTS
Prior to admission, pt was functioning in her roles, self sufficient & ambulating independently without any assistive devices.  Pt owns  a rolling walker , SAC and 3:1 commode from previous left TKR. Presently pt needs assistance with lower body self care tasks due to pain, weakness, stiffness and decreased ROM from revised left TKR. Pt is right hand dominant, wears glasses for reading and distance. .

## 2023-01-18 NOTE — OCCUPATIONAL THERAPY INITIAL EVALUATION ADULT - GENERAL OBSERVATIONS, REHAB EVAL
Pt was seen for initial OT consult, encountered  in bed in NAD on cardiac monitoring. Left knee dressing and preveena vac is in place.  Pt was AA&Ox4, cooperative & followed commands. Grade 1+ edema with stiffness and decrased ROM noted in left LE. Pt c/o left knee pain due to s/p TKR; this limits pt's activity tolerance ,balance, ADL management and functional mobility.

## 2023-01-18 NOTE — OCCUPATIONAL THERAPY INITIAL EVALUATION ADULT - PLANNED THERAPY INTERVENTIONS, OT EVAL
energy conservation techniques/ADL retraining/IADL retraining/balance training/bed mobility training/massage/parent/caregiver training.../ROM/strengthening/stretching/transfer training

## 2023-01-19 VITALS
RESPIRATION RATE: 18 BRPM | TEMPERATURE: 98 F | OXYGEN SATURATION: 98 % | DIASTOLIC BLOOD PRESSURE: 78 MMHG | HEART RATE: 98 BPM | SYSTOLIC BLOOD PRESSURE: 112 MMHG

## 2023-01-19 LAB
ANION GAP SERPL CALC-SCNC: 5 MMOL/L — SIGNIFICANT CHANGE UP (ref 5–17)
BUN SERPL-MCNC: 11 MG/DL — SIGNIFICANT CHANGE UP (ref 7–23)
CALCIUM SERPL-MCNC: 8.8 MG/DL — SIGNIFICANT CHANGE UP (ref 8.5–10.1)
CHLORIDE SERPL-SCNC: 104 MMOL/L — SIGNIFICANT CHANGE UP (ref 96–108)
CO2 SERPL-SCNC: 31 MMOL/L — SIGNIFICANT CHANGE UP (ref 22–31)
CREAT SERPL-MCNC: 0.83 MG/DL — SIGNIFICANT CHANGE UP (ref 0.5–1.3)
EGFR: 79 ML/MIN/1.73M2 — SIGNIFICANT CHANGE UP
GLUCOSE SERPL-MCNC: 114 MG/DL — HIGH (ref 70–99)
HCT VFR BLD CALC: 34.8 % — SIGNIFICANT CHANGE UP (ref 34.5–45)
HGB BLD-MCNC: 11.1 G/DL — LOW (ref 11.5–15.5)
MCHC RBC-ENTMCNC: 29.4 PG — SIGNIFICANT CHANGE UP (ref 27–34)
MCHC RBC-ENTMCNC: 31.9 G/DL — LOW (ref 32–36)
MCV RBC AUTO: 92.1 FL — SIGNIFICANT CHANGE UP (ref 80–100)
NRBC # BLD: 0 /100 WBCS — SIGNIFICANT CHANGE UP (ref 0–0)
PLATELET # BLD AUTO: 269 K/UL — SIGNIFICANT CHANGE UP (ref 150–400)
POTASSIUM SERPL-MCNC: 4.3 MMOL/L — SIGNIFICANT CHANGE UP (ref 3.5–5.3)
POTASSIUM SERPL-SCNC: 4.3 MMOL/L — SIGNIFICANT CHANGE UP (ref 3.5–5.3)
RBC # BLD: 3.78 M/UL — LOW (ref 3.8–5.2)
RBC # FLD: 14.1 % — SIGNIFICANT CHANGE UP (ref 10.3–14.5)
SODIUM SERPL-SCNC: 140 MMOL/L — SIGNIFICANT CHANGE UP (ref 135–145)
SURGICAL PATHOLOGY STUDY: SIGNIFICANT CHANGE UP
WBC # BLD: 6.69 K/UL — SIGNIFICANT CHANGE UP (ref 3.8–10.5)
WBC # FLD AUTO: 6.69 K/UL — SIGNIFICANT CHANGE UP (ref 3.8–10.5)

## 2023-01-19 RX ORDER — CELECOXIB 200 MG/1
1 CAPSULE ORAL
Qty: 30 | Refills: 0
Start: 2023-01-19 | End: 2023-02-17

## 2023-01-19 RX ADMIN — HYDROMORPHONE HYDROCHLORIDE 2 MILLIGRAM(S): 2 INJECTION INTRAMUSCULAR; INTRAVENOUS; SUBCUTANEOUS at 11:50

## 2023-01-19 RX ADMIN — SODIUM CHLORIDE 75 MILLILITER(S): 9 INJECTION, SOLUTION INTRAVENOUS at 06:09

## 2023-01-19 RX ADMIN — Medication 975 MILLIGRAM(S): at 14:18

## 2023-01-19 RX ADMIN — CELECOXIB 200 MILLIGRAM(S): 200 CAPSULE ORAL at 06:47

## 2023-01-19 RX ADMIN — Medication 975 MILLIGRAM(S): at 06:47

## 2023-01-19 RX ADMIN — PANTOPRAZOLE SODIUM 40 MILLIGRAM(S): 20 TABLET, DELAYED RELEASE ORAL at 06:04

## 2023-01-19 RX ADMIN — Medication 1 TABLET(S): at 11:41

## 2023-01-19 RX ADMIN — CELECOXIB 200 MILLIGRAM(S): 200 CAPSULE ORAL at 06:05

## 2023-01-19 RX ADMIN — ENOXAPARIN SODIUM 40 MILLIGRAM(S): 100 INJECTION SUBCUTANEOUS at 08:07

## 2023-01-19 RX ADMIN — Medication 10 MILLIGRAM(S): at 11:41

## 2023-01-19 RX ADMIN — Medication 975 MILLIGRAM(S): at 06:04

## 2023-01-19 RX ADMIN — HYDROMORPHONE HYDROCHLORIDE 2 MILLIGRAM(S): 2 INJECTION INTRAMUSCULAR; INTRAVENOUS; SUBCUTANEOUS at 02:54

## 2023-01-19 RX ADMIN — HYDROMORPHONE HYDROCHLORIDE 2 MILLIGRAM(S): 2 INJECTION INTRAMUSCULAR; INTRAVENOUS; SUBCUTANEOUS at 10:51

## 2023-01-19 RX ADMIN — BUPROPION HYDROCHLORIDE 150 MILLIGRAM(S): 150 TABLET, EXTENDED RELEASE ORAL at 11:42

## 2023-01-19 RX ADMIN — SCOPALAMINE 1 PATCH: 1 PATCH, EXTENDED RELEASE TRANSDERMAL at 08:02

## 2023-01-19 RX ADMIN — Medication 975 MILLIGRAM(S): at 14:50

## 2023-01-19 RX ADMIN — HYDROMORPHONE HYDROCHLORIDE 2 MILLIGRAM(S): 2 INJECTION INTRAMUSCULAR; INTRAVENOUS; SUBCUTANEOUS at 03:54

## 2023-01-19 RX ADMIN — HYDROMORPHONE HYDROCHLORIDE 0.5 MILLIGRAM(S): 2 INJECTION INTRAMUSCULAR; INTRAVENOUS; SUBCUTANEOUS at 06:05

## 2023-01-19 RX ADMIN — HYDROMORPHONE HYDROCHLORIDE 0.5 MILLIGRAM(S): 2 INJECTION INTRAMUSCULAR; INTRAVENOUS; SUBCUTANEOUS at 06:20

## 2023-01-19 RX ADMIN — Medication 1 MILLIGRAM(S): at 11:41

## 2023-01-19 NOTE — PROGRESS NOTE ADULT - SUBJECTIVE AND OBJECTIVE BOX
Ortho Post Op Check    Pt comfortable without complaints, pain controlled  Denies CP, SOB, N/V, numbness/tingling     Vital Signs Last 24 Hrs  T(C): 36.7 (01-17-23 @ 21:41), Max: 36.8 (01-17-23 @ 18:05)  T(F): 98 (01-17-23 @ 21:41), Max: 98.3 (01-17-23 @ 18:05)  HR: 96 (01-17-23 @ 21:41) (93 - 104)  BP: 110/75 (01-17-23 @ 21:41) (103/67 - 124/73)  BP(mean): --  RR: 16 (01-17-23 @ 21:41) (8 - 22)  SpO2: 95% (01-17-23 @ 21:41) (91% - 95%)    AVSS  General: Pt Alert and oriented, NAD  LLE:  DSG C/D/I Preevena to suction  HMVc in place  Pulses: Foot WWP; DP pulse 2+; Cap refill < 2 sec  Sensation and motor grossly intact slightly diminished 2/2 anesthesia             A/P: 62yFemale s/p L knee revision TKA POD 0  - Stable  - Pain Control  - DVT ppx: Lovenox  - Hmvc record drainage  - Post op abx: Ancef 2g Q8H x 2 doses  - WBS: WBAT  - PT: pending PT eval  - dispo planning    
Patient seen and examined at bedside. Resting comfortably. Pain is controlled. Denies fever, chills, CP, SOB, numbness/tingling or any other complaints.     LABS:                        11.4   11.58 )-----------( 283      ( 18 Jan 2023 06:25 )             35.5     01-18    139  |  104  |  14  ----------------------------<  129<H>  3.8   |  29  |  0.90    Ca    8.3<L>      18 Jan 2023 06:25      VITAL SIGNS:  T(C): 36.7 (01-19-23 @ 04:49), Max: 36.9 (01-18-23 @ 08:18)  HR: 88 (01-19-23 @ 04:49) (82 - 108)  BP: 119/80 (01-19-23 @ 04:49) (104/69 - 120/74)  RR: 16 (01-19-23 @ 04:49) (16 - 18)  SpO2: 93% (01-19-23 @ 04:49) (91% - 94%)    LLE:   Dressing c/d/i, Prevena in place  + EHL/FHL/GS/TA  SILT L2-S1  2+ DP  Compartments soft and compressible  Calf NTTP BL      A/P: 62yFemale s/p L knee revision TKA POD 2    - Pain Control prn.   - WBAT, PT/OT  - DVT ppx: Lovenox  - Prevena in place.   -HMV Dc'd.   -Dispo: Home, pending dc.   Will discuss plan w/ Dr. Salvador and change accordingly.     
Patient seen and examined at bedside. Resting comfortably. Pain is controlled. Denies fever, chills, CP, SOB, numbness/tingling or any other complaints.     LABS:                        13.1   16.49 )-----------( 315      ( 17 Jan 2023 16:43 )             39.6     01-17    136  |  104  |  17  ----------------------------<  161<H>  3.6   |  23  |  0.95    Ca    8.7      17 Jan 2023 16:43    VITAL SIGNS:  T(C): 36.6 (01-18-23 @ 01:10), Max: 37 (01-17-23 @ 11:00)  HR: 99 (01-18-23 @ 01:10) (93 - 104)  BP: 117/78 (01-18-23 @ 01:10) (103/67 - 130/71)  RR: 16 (01-18-23 @ 01:10) (8 - 22)  SpO2: 94% (01-18-23 @ 01:10) (91% - 98%)    LLE:   Dressing c/d/i, Prevena in place  + EHL/FHL/GS/TA  SILT L2-S1  2+ DP  Compartments soft and compressible  Calf NTTP BL      A/P: 62yFemale s/p L knee revision TKA POD 1    - Pain Control prn.   - WBAT, PT/OT  - DVT ppx: Lovenox  - Prevena in place.   -HMV Dc'd.   -Dispo: Pending PT Eval.   Will discuss plan w/ Dr. Salvador and change accordingly.

## 2023-01-20 LAB
CULTURE RESULTS: SIGNIFICANT CHANGE UP
CULTURE RESULTS: SIGNIFICANT CHANGE UP
SPECIMEN SOURCE: SIGNIFICANT CHANGE UP
SPECIMEN SOURCE: SIGNIFICANT CHANGE UP

## 2023-01-23 RX ORDER — HYDROMORPHONE HYDROCHLORIDE 2 MG/1
2 TABLET ORAL
Qty: 40 | Refills: 0 | Status: ACTIVE | COMMUNITY
Start: 2023-01-23 | End: 1900-01-01

## 2023-01-24 DIAGNOSIS — Z98.1 ARTHRODESIS STATUS: ICD-10-CM

## 2023-01-24 DIAGNOSIS — K58.9 IRRITABLE BOWEL SYNDROME WITHOUT DIARRHEA: ICD-10-CM
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
## 2023-01-24 DIAGNOSIS — Z88.1 ALLERGY STATUS TO OTHER ANTIBIOTIC AGENTS STATUS: ICD-10-CM

## 2023-01-24 DIAGNOSIS — G47.33 OBSTRUCTIVE SLEEP APNEA (ADULT) (PEDIATRIC): ICD-10-CM
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
## 2023-01-24 DIAGNOSIS — J45.909 UNSPECIFIED ASTHMA, UNCOMPLICATED: ICD-10-CM

## 2023-01-24 DIAGNOSIS — T84.023A INSTABILITY OF INTERNAL LEFT KNEE PROSTHESIS, INITIAL ENCOUNTER: ICD-10-CM

## 2023-01-26 RX ORDER — HYDROCODONE BITARTRATE AND ACETAMINOPHEN 7.5; 325 MG/1; MG/1
7.5-325 TABLET ORAL
Qty: 42 | Refills: 0 | Status: ACTIVE | COMMUNITY
Start: 2023-01-26 | End: 1900-01-01

## 2023-01-26 RX ORDER — HYDROCODONE BITARTRATE AND ACETAMINOPHEN 7.5; 3 MG/1; MG/1
7.5-3 TABLET ORAL
Qty: 60 | Refills: 0 | Status: DISCONTINUED | COMMUNITY
Start: 2023-01-26 | End: 2023-01-26

## 2023-01-26 RX ORDER — CYCLOBENZAPRINE HYDROCHLORIDE 5 MG/1
5 TABLET, FILM COATED ORAL 3 TIMES DAILY
Qty: 90 | Refills: 1 | Status: ACTIVE | COMMUNITY
Start: 2023-01-26 | End: 1900-01-01

## 2023-01-28 ENCOUNTER — RX RENEWAL (OUTPATIENT)
Age: 63
End: 2023-01-28

## 2023-02-01 ENCOUNTER — RX RENEWAL (OUTPATIENT)
Age: 63
End: 2023-02-01

## 2023-02-01 LAB
CULTURE RESULTS: SIGNIFICANT CHANGE UP
GRAM STN FLD: SIGNIFICANT CHANGE UP
SPECIMEN SOURCE: SIGNIFICANT CHANGE UP

## 2023-02-05 VITALS — HEIGHT: 62 IN | WEIGHT: 145 LBS | BODY MASS INDEX: 26.68 KG/M2

## 2023-02-05 NOTE — PROCEDURE
[de-identified] : Observation on incision dry, clean, intact, well healed. Method staple removing kit. Suture site Cleaned with iodine swab after sutures are completely removed. Instructions Keep incision dry and clean, allowed to shower and pat site dry, do not rub dry, contact office is site becomes red, swollen, infected, or you develop a fever. \par \par

## 2023-02-05 NOTE — HISTORY OF PRESENT ILLNESS
[Chills] : no chills [Constipation] : no constipation [Diarrhea] : no diarrhea [Dysuria] : no dysuria [Fever] : no fever [Nausea] : no nausea [Vomiting] : no vomiting [Clean/Dry/Intact] : clean, dry and intact [Healed] : healed [Erythema] : not erythematous [Discharge] : absent of discharge [Swelling] : swollen [Dehiscence] : not dehisced [Neuro Intact] : an unremarkable neurological exam [Vascular Intact] : ~T peripheral vascular exam normal [Negative Deena's] : maneuvers demonstrated a negative Deena's sign [Doing Well] : is doing well [No Sign of Infection] : is showing no signs of infection [Adequate Pain Control] : has adequate pain control [Staples Removed] : staples were removed [de-identified] : Post-op visit [de-identified] : Patient presents today for the F/U S/P Left knee revision TKR done 3 weeks ago. Patient is doing well and undergoing P.T. with improvement. Takes pain meds and DVT prophylaxis. I went over post-op care answered all her questions. I also provided her with surgical report for her records. [de-identified] : ROM 0-90 degrees [de-identified] : Continue P.T>, pain management and DVT prophylaxis. F/U in 1 month with x-rays.

## 2023-02-08 ENCOUNTER — APPOINTMENT (OUTPATIENT)
Dept: ORTHOPEDIC SURGERY | Facility: CLINIC | Age: 63
End: 2023-02-08
Payer: COMMERCIAL

## 2023-02-08 PROBLEM — K64.9 UNSPECIFIED HEMORRHOIDS: Chronic | Status: ACTIVE | Noted: 2023-01-17

## 2023-02-08 PROBLEM — K58.9 IRRITABLE BOWEL SYNDROME WITHOUT DIARRHEA: Chronic | Status: ACTIVE | Noted: 2023-01-17

## 2023-02-08 PROBLEM — K58.9 IRRITABLE BOWEL SYNDROME, UNSPECIFIED: Chronic | Status: ACTIVE | Noted: 2023-01-17

## 2023-02-08 PROBLEM — M50.20 OTHER CERVICAL DISC DISPLACEMENT, UNSPECIFIED CERVICAL REGION: Chronic | Status: ACTIVE | Noted: 2023-01-17

## 2023-02-08 PROBLEM — U07.1 COVID-19: Chronic | Status: ACTIVE | Noted: 2022-02-15

## 2023-02-08 PROCEDURE — 99024 POSTOP FOLLOW-UP VISIT: CPT

## 2023-02-08 RX ORDER — TRAMADOL HYDROCHLORIDE 50 MG/1
50 TABLET, COATED ORAL
Qty: 40 | Refills: 0 | Status: ACTIVE | COMMUNITY
Start: 2023-02-08 | End: 1900-01-01

## 2023-02-15 LAB
CULTURE RESULTS: SIGNIFICANT CHANGE UP
SPECIMEN SOURCE: SIGNIFICANT CHANGE UP

## 2023-02-16 ENCOUNTER — TRANSCRIPTION ENCOUNTER (OUTPATIENT)
Age: 63
End: 2023-02-16

## 2023-02-16 RX ORDER — CELECOXIB 200 MG/1
200 CAPSULE ORAL DAILY
Qty: 30 | Refills: 1 | Status: ACTIVE | COMMUNITY
Start: 2023-02-16 | End: 1900-01-01

## 2023-02-28 ENCOUNTER — RX RENEWAL (OUTPATIENT)
Age: 63
End: 2023-02-28

## 2023-03-01 ENCOUNTER — APPOINTMENT (OUTPATIENT)
Dept: OTOLARYNGOLOGY | Facility: CLINIC | Age: 63
End: 2023-03-01

## 2023-03-03 ENCOUNTER — APPOINTMENT (OUTPATIENT)
Dept: ORTHOPEDIC SURGERY | Facility: CLINIC | Age: 63
End: 2023-03-03
Payer: COMMERCIAL

## 2023-03-03 PROCEDURE — 99024 POSTOP FOLLOW-UP VISIT: CPT

## 2023-03-03 PROCEDURE — 73562 X-RAY EXAM OF KNEE 3: CPT | Mod: LT

## 2023-03-04 LAB
CULTURE RESULTS: SIGNIFICANT CHANGE UP
NIGHT BLUE STAIN TISS: SIGNIFICANT CHANGE UP
SPECIMEN SOURCE: SIGNIFICANT CHANGE UP

## 2023-03-13 ENCOUNTER — TRANSCRIPTION ENCOUNTER (OUTPATIENT)
Age: 63
End: 2023-03-13

## 2023-03-13 RX ORDER — CLINDAMYCIN HYDROCHLORIDE 300 MG/1
300 CAPSULE ORAL
Qty: 10 | Refills: 2 | Status: ACTIVE | COMMUNITY
Start: 2023-03-13 | End: 1900-01-01

## 2023-03-20 NOTE — HISTORY OF PRESENT ILLNESS
[FreeTextEntry1] : 3/21/23- fu for left SI on 11/18\par \par 11/11/2022: follow up today after right cervical RFA on 10/24/22. Had 80% relief so far. Now pain is in left SI joint will repeat injection.  \par \par 08/18/2022: follow up today after left cervical RFA on 7/22/22.  Had 50% so far.  Has not had as much improvement as in past.  Also complains of pain in thoracic area.  Will give TPI\par \par 7/15/22-  L LS MBB without relief;  c/o pain in lower back, mainly L sided with cramping in L ankle ; consider caudal michael prior L upper cerv RFA with >70% relief sustained several months with improved rom and adls, will repeat. Has been on Topamax in the past with good relief. \par \par 3/25/22: follow up today to MRI lumbar spine: (3/17/22) Impression:\par 1. Mild central stenosis with bilateral exiting L3 nerve root impingement at L3-L4, scoliosis, and postoperative changes\par without recurrent disc herniation at L4-L5.\par 2. Multilevel degenerative disc disease and posterior protrusion at T11-T12 with hemangioma at L1 and multilevel\par foraminal narrowing.\par 3. No acute fracture, discitis, or enhancing postoperative fluid collection on postcontrast imaging.\par 4. Clinical correlation regarding prior surgical history is recommended.\par has pain in the right foot when she puts weight on it. Thought initially it was a neuroma. however ruled out with\par imaging.\par \par Had fusion L4/5 in September 2020. I would recommend EMG/NCV>\par She would like to repeat cervical RFA as well.\par \par 3/9/22: follow up toady. Pain in the left trap. yesterday she bent down and she felt a pop. now when she bends she feels like her back is going to give out. difficulty with change of position. Pain in the left lower back. Pain radiates down the posterior left leg.\par \par 8/13/21: follow up today after left SIJ injection on 7/23/21. She reports great relief initially and pain recurred about 3-4 days later. takes flexeril PRN (makes her sleepy) Pain under the left shoulder blade. Had CHERYL's in the past with relief. Consider left SIJ RFA.\par \par 7/23/21: follow up today after right cervical RFA on 7/8/21. patient starting to feel better. Since last visit had CT scan and met with Dr. Hunter Chow who recommended a Left SIJ injection.\par \par 2/26/21: follow up today after right cervical RFA on 11/13/21. Had 60% improvement but now the left side of neck is hurting again. MRI 10/20- S/P L4-L5 laminectomy . Hardware intact\par Will give TPI today and schedule repeat RFA on left. SCS info given\par \par 8.14.20: follow up today after left cervical RFA. Pt with some improvement. I would recommend she give it another 4 weeks. I would recommend right cervical RFA as it has helped in the past.\par \par 7.3.20 - follow up today after left L5/S1 and S1 and CHERYL. The neck improved about 50%.Has left lower back pain. sitting aggravated her left lower back. numbness in the legs. last left lumbar RFA was in September. [Neck] : neck [Lower back] : lower back [3] : 3 [0] : 0 [Dull/Aching] : dull/aching [Intermittent] : intermittent [Sleep] : sleep [Rest] : rest [Meds] : meds [Ice] : ice [Heat] : heat [Injection therapy] : injection therapy [Standing] : standing [Walking] : walking [Bending forward] : bending forward [Exercising] : exercising [Retired] : Work status: retired [] : yes [FreeTextEntry7] : to the left side [FreeTextEntry9] : Motrin  [de-identified] : Driving [TWNoteComboBox1] : 50%

## 2023-03-21 ENCOUNTER — APPOINTMENT (OUTPATIENT)
Dept: PAIN MANAGEMENT | Facility: CLINIC | Age: 63
End: 2023-03-21

## 2023-03-22 ENCOUNTER — APPOINTMENT (OUTPATIENT)
Dept: PAIN MANAGEMENT | Facility: CLINIC | Age: 63
End: 2023-03-22
Payer: COMMERCIAL

## 2023-03-22 VITALS — WEIGHT: 150 LBS | BODY MASS INDEX: 27.6 KG/M2 | HEIGHT: 62 IN

## 2023-03-22 DIAGNOSIS — M46.1 SACROILIITIS, NOT ELSEWHERE CLASSIFIED: ICD-10-CM

## 2023-03-22 PROCEDURE — 99214 OFFICE O/P EST MOD 30 MIN: CPT

## 2023-03-22 NOTE — PHYSICAL EXAM
[Rotation to left] : rotation to left [Rotation to right] : rotation to right [] : no palpable masses [de-identified] : left lateral rotation 75 degrees [TWNoteComboBox6] : right lateral rotation 75 degrees

## 2023-03-22 NOTE — ASSESSMENT
[FreeTextEntry1] : After discussing various treatment options with the patient including but not limited to oral medications, physical therapy, exercise, modalities as well as interventional spinal injections, we have decided with the following plan:\par \par 1) Intervention Injection Therapy:\par I personally reviewed the MRI/CT scan images and agree with the radiologist's report. The radiological findings were discussed with the patient.\par The risks, benefits, contents and alternatives to injection were explained in full to the patient. Risks outlined include but are not limited to infection,sepsis, bleeding, post-dural puncture headache, nerve damage, temporary increase in pain, syncopal episode, failure to resolve symptoms, allergic reaction, symptom recurrence, and elevation of blood sugar in diabetics. Cortisone may cause immunosuppression. Patient understands the risks. All questions were answered. After discussion of options, patient requested an injection. Information regarding the injection was given to the patient. Which medications to stop prior to the injection was explained to the patient as well.\par \par Follow up in 1-2 weeks post injection for re-evaluation. \par Continue Home exercises, stretching, activity modification, physical therapy, and conservative care.\par \par Patient has cervical axial pain that is consistent with facet joint pathology.  A diagnostic temporary block with local anesthetic  of the medial branch was performed and has resulted in at least a 80% reduction in pain for the duration of the specific local anesthetic effect.  The pain is not radicular and there is absence of nerve root compression.  There is no prior spinal fusion surgery at the level targeted.  The pain has failed to respond to three months of conservative therapy.\par \par \par 2) I would recommend a trial of neuropathic medication as patient presents with signs of nerve irritation. (ie burning, paresthesias etc) Goals of therapy would be to improve pain and overall QOL. Side effects reviewed with patient. Patient will call or stop medication if given side effects occur. \par \par SI joint left\par left and right C2-C5 RFA

## 2023-03-22 NOTE — HISTORY OF PRESENT ILLNESS
[Neck] : neck [Lower back] : lower back [Dull/Aching] : dull/aching [Intermittent] : intermittent [Sleep] : sleep [Rest] : rest [Meds] : meds [Ice] : ice [Heat] : heat [Injection therapy] : injection therapy [Standing] : standing [Walking] : walking [Bending forward] : bending forward [Exercising] : exercising [Retired] : Work status: retired [5] : 5 [3] : 3 [Physical therapy] : physical therapy [FreeTextEntry1] : 3/22/23: fu for left SI on 11/18 with 70% relief. Had revision of left knee in january.  \par \par 11/11/2022: follow up today after right cervical RFA on 10/24/22. Had 80% relief so far. Now pain is in left SI joint will repeat injection.  \par \par 08/18/2022: follow up today after left cervical RFA on 7/22/22.  Had 50% so far.  Has not had as much improvement as in past.  Also complains of pain in thoracic area.  Will give TPI\par \par 7/15/22-  L LS MBB without relief;  c/o pain in lower back, mainly L sided with cramping in L ankle ; consider caudal michael prior L upper cerv RFA with >70% relief sustained several months with improved rom and adls, will repeat. Has been on Topamax in the past with good relief. \par \par 3/25/22: follow up today to MRI lumbar spine: (3/17/22) Impression:\par 1. Mild central stenosis with bilateral exiting L3 nerve root impingement at L3-L4, scoliosis, and postoperative changes\par without recurrent disc herniation at L4-L5.\par 2. Multilevel degenerative disc disease and posterior protrusion at T11-T12 with hemangioma at L1 and multilevel\par foraminal narrowing.\par 3. No acute fracture, discitis, or enhancing postoperative fluid collection on postcontrast imaging.\par 4. Clinical correlation regarding prior surgical history is recommended.\par has pain in the right foot when she puts weight on it. Thought initially it was a neuroma. however ruled out with\par imaging.\par \par Had fusion L4/5 in September 2020. I would recommend EMG/NCV>\par She would like to repeat cervical RFA as well.\par \par 3/9/22: follow up toady. Pain in the left trap. yesterday she bent down and she felt a pop. now when she bends she feels like her back is going to give out. difficulty with change of position. Pain in the left lower back. Pain radiates down the posterior left leg.\par \par 8/13/21: follow up today after left SIJ injection on 7/23/21. She reports great relief initially and pain recurred about 3-4 days later. takes flexeril PRN (makes her sleepy) Pain under the left shoulder blade. Had CHERYL's in the past with relief. Consider left SIJ RFA.\par \par 7/23/21: follow up today after right cervical RFA on 7/8/21. patient starting to feel better. Since last visit had CT scan and met with Dr. Hunter Chow who recommended a Left SIJ injection.\par \par 2/26/21: follow up today after right cervical RFA on 11/13/21. Had 60% improvement but now the left side of neck is hurting again. MRI 10/20- S/P L4-L5 laminectomy . Hardware intact\par Will give TPI today and schedule repeat RFA on left. SCS info given\par \par 8.14.20: follow up today after left cervical RFA. Pt with some improvement. I would recommend she give it another 4 weeks. I would recommend right cervical RFA as it has helped in the past.\par \par 7.3.20 - follow up today after left L5/S1 and S1 and CHERYL. The neck improved about 50%.Has left lower back pain. sitting aggravated her left lower back. numbness in the legs. last left lumbar RFA was in September. [] : no [FreeTextEntry7] : to the left side [FreeTextEntry9] : Motrin  [de-identified] : Driving [TWNoteComboBox1] : 50%

## 2023-03-24 ENCOUNTER — TRANSCRIPTION ENCOUNTER (OUTPATIENT)
Age: 63
End: 2023-03-24

## 2023-03-28 ENCOUNTER — TRANSCRIPTION ENCOUNTER (OUTPATIENT)
Age: 63
End: 2023-03-28

## 2023-03-30 ENCOUNTER — APPOINTMENT (OUTPATIENT)
Dept: ORTHOPEDIC SURGERY | Facility: CLINIC | Age: 63
End: 2023-03-30
Payer: COMMERCIAL

## 2023-03-30 VITALS — BODY MASS INDEX: 27.6 KG/M2 | WEIGHT: 150 LBS | HEIGHT: 62 IN

## 2023-03-30 DIAGNOSIS — S93.509A UNSPECIFIED SPRAIN OF UNSPECIFIED TOE(S), INITIAL ENCOUNTER: ICD-10-CM

## 2023-03-30 PROCEDURE — 73630 X-RAY EXAM OF FOOT: CPT | Mod: RT

## 2023-03-30 PROCEDURE — 99213 OFFICE O/P EST LOW 20 MIN: CPT | Mod: 25

## 2023-03-30 NOTE — PROCEDURE
[Other: ____] : [unfilled] [3rd] : 3rd [Intermetatarsal space] : intermetatarsal space [___ cc    6mg] :  Betamethasone (Celestone) ~Vcc of 6mg [___ cc    1%] : Lidocaine ~Vcc of 1%  [___ cc    0.25%] : Bupivacaine (Marcaine) ~Vcc of 0.25%  [Call if redness, pain or fever occur] : call if redness, pain or fever occur [Apply ice for 15min out of every hour for the next 12-24 hours as tolerated] : apply ice for 15 minutes out of every hour for the next 12-24 hours as tolerated

## 2023-03-30 NOTE — HISTORY OF PRESENT ILLNESS
[8] : 8 [0] : 0 [Sharp] : sharp [Stabbing] : stabbing [Frequent] : frequent [Household chores] : household chores [Leisure] : leisure [Work] : work [Nothing helps with pain getting better] : Nothing helps with pain getting better [Standing] : standing [Walking] : walking [Stairs] : stairs [Retired] : Work status: retired [de-identified] : 11/17/22: Pt is a 62 year old female who presents for right foot pain. Pt states in 2017, she had a5th ht repair by right foot by Dr. Stacy. Pt states on 11/15/22, she stepped off the last step and when she stepped on the floor, herfourth toe bent. Pt denies swelling to toe. Pt denies numbness/tingling. Pt describes pain as sharp. Pt states pain is most prevalent with putting too much pressure on toe. Pt notes pain is present while walking, and limps due to pain. Pt notes pain is unbearable while walking down stairs. Pt states within the last few years she noticed the shape of her toe change, and chronic pain. Pt did not see anyone for injury. Pt did not take medications to alleviate pain. Pt ices. difficulty bearing weight. \par \par 12/9/2022: f/u R foot  wb in p-op shoe po tape  \par 3/30/23  f/u R foot  pain persist to 4>3 toes.  Tried pads, taping [] : no [FreeTextEntry1] : right foot [FreeTextEntry5] : pt states no change. pain when WB. pt wearing hard shoe.

## 2023-03-30 NOTE — PHYSICAL EXAM
[Right] : right foot and ankle [Mild] : mild swelling of toe(s) [4th] : 4th [3rd] : 3rd [] : no calf tenderness

## 2023-04-05 ENCOUNTER — APPOINTMENT (OUTPATIENT)
Dept: ORTHOPEDIC SURGERY | Facility: CLINIC | Age: 63
End: 2023-04-05
Payer: COMMERCIAL

## 2023-04-05 VITALS — BODY MASS INDEX: 27.6 KG/M2 | WEIGHT: 150 LBS | HEIGHT: 62 IN

## 2023-04-05 PROCEDURE — 73562 X-RAY EXAM OF KNEE 3: CPT | Mod: LT

## 2023-04-05 PROCEDURE — 99213 OFFICE O/P EST LOW 20 MIN: CPT | Mod: 24

## 2023-04-05 NOTE — HISTORY OF PRESENT ILLNESS
[de-identified] : RASHARD GALLEGOS 62 year old female presents for follow-up evaluation s/p LEFT revision TKR at 3 months. She is doing well and undergoing PT. She has great motion. While doing exercises at PT she started to have pain in her tibia 1 week ago. She is not sure if she should continue said exercises. She takes Motrin 800 mg occasionally for her symptoms. She ices with elevation as needed.

## 2023-04-05 NOTE — PHYSICAL EXAM
[de-identified] : General appearance: well nourished and hydrated, pleasant, alert and oriented x 3, cooperative.\par HEENT: Normocephalic, EOM intact, Nasal septum midline, Oral cavity clear, External auditory canal clear.\par Cardiovascular: no apparent abnormalities, no lower leg edema, no varicosities, pedal pulses are palpable.\par Lymphatics Lymph nodes: none palpated, Lymphedema: not present.\par Neurologic: sensation is normal, no muscle weakness in upper or lower extremities, patella tendon reflexes intact .\par Dermatologic no apparent skin lesions, moist, warm, no rash.\par Spine:cervical spine appears normal and moves freely, thoracic spine appears normal and moves freely, lumbosacral spine appears normal and moves freely.\par Gait: nonantalgic.\par \par Left Knee\par Inspection: no effusion, minimal soft tissue swelling \par Wounds: healed midline incision\par Alignment: normal.\par Palpation: no specific tenderness on palpation.\par ROM: Active (in degrees): 0-110\par Ligamentous laxity (neg): negative ant. drawer test, negative post. drawer test, stable to varus stress test, stable to valgus stress test,\par Patellofemoral Alignment Test: Q angle-, normal.\par Muscle Test: good quad strength.\par Leg examination: calf is soft and non-tender. [de-identified] : LEFT knee x-ray, AP, lateral, merchant view taken at the office today demonstrates a revision total knee replacement in satisfactory position and alignment. No evidence of loosening. The patella sits in a central position.\par \par RIGHT knee x-ray merchant view taken at the office today demonstrates good joint space and a well centered patella.

## 2023-04-05 NOTE — HISTORY OF PRESENT ILLNESS
[de-identified] : RASHARD GALLEGOS 62 year old female presents for follow-up evaluation s/p LEFT total knee revision at 6 weeks. She is doing well. She is going to PT and working on her ROM. She is taking Motrin 800 as needed. She is not using any assisting devices. She has a small area at the midpoint of her incision where a suture was poking out. Patient would like a new script for PT.

## 2023-04-05 NOTE — ADDENDUM
[FreeTextEntry1] : This note was written by Kim Khanna on 04/05/2023 acting as scribe for Dr. Stephan Salvador M.D.\par \par I, Dr. Stephan Salvador, have read and attest that all the information, medical decision making and discharge instructions within are true and accurate.\par \par This note was written by Keith Gonzalez on 04/05/2023 acting as scribe for Dr. Stephan Salvador M.D.\par \par I, Dr. Stephan Salvador, have read and attest that all the information, medical decision making and discharge instructions within are true and accurate.

## 2023-04-05 NOTE — DISCUSSION/SUMMARY
[de-identified] : Pt is doing well s/p left revision TKR. Her residual symptoms are muscular in nature. Patient can continue activities as tolerated. All questions answered, understanding verbalized. Patient in agreement with plan of care.\par \par I will see her back in 3 months.

## 2023-04-05 NOTE — ADDENDUM
[FreeTextEntry1] : This note was written by Keith Gonzalez on 03/03/2023 acting as scribe for Dr. Stephan Salvador M.D.\par \par I, Dr. Stephan Salvador, have read and attest that all the information, medical decision making and discharge instructions within are true and accurate.\par \par This note was written by JARROD CABELLO on 03/03/2023 acting as scribe for Dr. Stephan Salvador M.D.\par \par I, Dr. Stephan Salvador, have read and attest that all the information, medical decision making and discharge instructions within are true and accurate.

## 2023-04-05 NOTE — PHYSICAL EXAM
[de-identified] : General appearance: well nourished and hydrated, pleasant, alert and oriented x 3, cooperative.\par HEENT: Normocephalic, EOM intact, Nasal septum midline, Oral cavity clear, External auditory canal clear.\par Cardiovascular: no apparent abnormalities, no lower leg edema, no varicosities, pedal pulses are palpable.\par Lymphatics Lymph nodes: none palpated, Lymphedema: not present.\par Neurologic: sensation is normal, no muscle weakness in upper or lower extremities, patella tendon reflexes intact .\par Dermatologic no apparent skin lesions, moist, warm, no rash.\par Spine:cervical spine appears normal and moves freely, thoracic spine appears normal and moves freely, lumbosacral spine appears normal and moves freely.\par Gait: nonantalgic.\par \par Left Knee\par Inspection: no effusion\par Wounds: healed midline incision\par Alignment: normal.\par Palpation: no specific tenderness on palpation.\par ROM: Active (in degrees): 0-128\par Ligamentous laxity (neg): negative ant. drawer test, negative post. drawer test, stable to varus stress test, stable to valgus stress test,\par Patellofemoral Alignment Test: Q angle-, normal.\par Muscle Test: good quad strength.\par Leg examination: calf is soft and non-tender.  [de-identified] : LEFT knee x-ray, AP, lateral, merchant view taken at the office today demonstrates a revision total knee replacement in satisfactory position and alignment. No evidence of loosening. The patella sits in a central position.\par \par RIGHT knee x-ray merchant view taken at the office today demonstrates good joint space and a well centered patella.

## 2023-04-05 NOTE — DISCUSSION/SUMMARY
[de-identified] : Patient is doing well following their s/p L Revision TKR at 6 weeks. I reviewed x-rays with them. I have reassured them that their implants are functioning well.\par \par He/She is encouraged to continue to stay active, a home exercise program, and a new prescription for PT was provided.\par \par I will see them back in 6-8 weeks with x-ray.

## 2023-04-12 ENCOUNTER — APPOINTMENT (OUTPATIENT)
Dept: PAIN MANAGEMENT | Facility: CLINIC | Age: 63
End: 2023-04-12

## 2023-04-14 ENCOUNTER — APPOINTMENT (OUTPATIENT)
Dept: ORTHOPEDIC SURGERY | Facility: CLINIC | Age: 63
End: 2023-04-14
Payer: COMMERCIAL

## 2023-04-14 VITALS — WEIGHT: 150 LBS | BODY MASS INDEX: 27.6 KG/M2 | HEIGHT: 62 IN

## 2023-04-14 PROCEDURE — 99213 OFFICE O/P EST LOW 20 MIN: CPT

## 2023-04-14 NOTE — HISTORY OF PRESENT ILLNESS
[8] : 8 [0] : 0 [Localized] : localized [Sharp] : sharp [Retired] : Work status: retired [de-identified] : 11/17/22: Pt is a 62 year old female who presents for right foot pain. Pt states in 2017, she had a5th ht repair by right foot by Dr. Stacy. Pt states on 11/15/22, she stepped off the last step and when she stepped on the floor, herfourth toe bent. Pt denies swelling to toe. Pt denies numbness/tingling. Pt describes pain as sharp. Pt states pain is most prevalent with putting too much pressure on toe. Pt notes pain is present while walking, and limps due to pain. Pt notes pain is unbearable while walking down stairs. Pt states within the last few years she noticed the shape of her toe change, and chronic pain. Pt did not see anyone for injury. Pt did not take medications to alleviate pain. Pt ices. difficulty bearing weight. \par \par 12/9/2022: f/u R foot  wb in p-op shoe po tape  \par 3/30/23  f/u R foot  pain persist to 4>3 toes.  Tried pads, taping\par 4/14/23  f/u R foot  Had good response from last injection but now wearing off [] : Post Surgical Visit: no [FreeTextEntry1] : R foot [FreeTextEntry3] : 11/15/22 [FreeTextEntry5] : 63 y/o LHD F eval R foot s/p toe sprain on above DOI. pt states there has been a slight improvement in condition sicne last visit.  [de-identified] : [facility] 3/30/23 [de-identified] : [facility]

## 2023-04-17 ENCOUNTER — RX RENEWAL (OUTPATIENT)
Age: 63
End: 2023-04-17

## 2023-04-17 ENCOUNTER — FORM ENCOUNTER (OUTPATIENT)
Age: 63
End: 2023-04-17

## 2023-04-17 RX ORDER — ONDANSETRON 4 MG/1
4 TABLET, ORALLY DISINTEGRATING ORAL TWICE DAILY
Qty: 60 | Refills: 0 | Status: ACTIVE | COMMUNITY
Start: 2023-01-26 | End: 1900-01-01

## 2023-04-18 ENCOUNTER — APPOINTMENT (OUTPATIENT)
Dept: MRI IMAGING | Facility: CLINIC | Age: 63
End: 2023-04-18
Payer: COMMERCIAL

## 2023-04-18 PROCEDURE — 73718 MRI LOWER EXTREMITY W/O DYE: CPT | Mod: RT

## 2023-04-21 ENCOUNTER — APPOINTMENT (OUTPATIENT)
Dept: PAIN MANAGEMENT | Facility: CLINIC | Age: 63
End: 2023-04-21
Payer: COMMERCIAL

## 2023-04-21 PROCEDURE — 64633 DESTROY CERV/THOR FACET JNT: CPT | Mod: LT

## 2023-04-21 PROCEDURE — J3490M: CUSTOM

## 2023-04-21 PROCEDURE — 64634Z: CUSTOM | Mod: 59,LT

## 2023-04-21 NOTE — PROCEDURE
[FreeTextEntry3] : Date of Service: 04/21/2023 \par \par Account: [id number]\par \par Patient: RASHARD GALLEGOS \par \par YOB: 1960\par \par Age: 62 year\par \par \par Surgeon: Genoveva Monroe M.D.\par \par Assistant: None.\par \par Pre-Operative Diagnosis: Spondylosis of Cervical Region without Myelopathy or Radiculopathy (M47.812)\par \par Post Operative Diagnosis: Spondylosis of Cervical Region without Myelopathy or Radiculopathy (M47.812)\par \par Procedure: Left Third Occipital Nerve, C3, C4, C5 Medial Branch Radiofrequency Neurolysis under fluoroscopic guidance.\par \par Anesthesia:                    MAC\par \par \par This procedure was carried out using fluoroscopic guidance.  The risks and benefits of the procedure were discussed extensively with the patient.  The consent of the patient was obtained and the following procedure was performed.\par \par The patient was placed in the prone position.  The patient's neck was prepped and draped in a sterile fashion.  The C2,C3,C4,C5 vertebral levels were identified and the fluoroscope left obliqued to approximately 10 degrees to reveal the "waste" of the left articular pillars at the C2,C3,C4,C5 levels and these were marked.  The skin at these target points was then localized using 1 cc of 1% Lidocaine without epinephrine at each injection site.  A 20 Gauge 100mm tracie needle with a 5mm active curved tip was then introduced and advanced at these three levels to the above target points, and under lateral view the needle tip was advanced a few millimeters and confirmed at the middle of the classical "trapezius" structure on oss. \par \par The stylette for the most cephalad needle at the C2 level was then removed and a Tracie radiofrequency probe was then placed inside the needle. After impedences were checked at approximately 240 ohm, 50 hertz sensory stimulation was performed.  Patient experienced concordant pain in their left neck at approximately 0.6 volts. The voltage was then increased to 1 volt. The patient reported increased left neck pain symptoms without any radiation below the left shoulder.  Stimulation was then changed to 2 hertz and increased slowly by 0.1 volt increments at approximately 0.8 volts, patient began to experience thumping like reproductive pain in their left neck. The voltage was then increased to approximately 2.5 volts. Patient experienced increasing thumping without any sensation below their left shoulder. This exact stimulation was then repeated for the C3,C4,C5 needles reproductive left sided neck pain and no radiation below the left shoulder. The 3 levels were then anesthetized with approximately 0.5 cc's of 1% Lidocaine. After which each area was then ablated at 80 degrees centigrade for 60 seconds each. Patient felt no pain reproduction during the ablation procedure.  After each of these levels were ablated, approximately 0.5 cc of 0.25% Bupivacaine plus 12mg of Celestone were then injected before the needles were removed.  Pressure was then applied to the neck.  Band-aids were applied.  Patient was brought to the recovery, ambulated on their own after the procedure and reported decreased left sided neck pain. \par \par Anesthesia personnel were present throughout the procedure. Patient was told to apply ice to the neck for 20 minutes on and 20 minutes off for 24-48 hours. Patient is to call the office, if they had any questions or concerns.   The patient was also instructed to contact me immediately if there were any problems.\par \par Genoveva Monroe M.D.\par \par

## 2023-04-28 ENCOUNTER — APPOINTMENT (OUTPATIENT)
Dept: PAIN MANAGEMENT | Facility: CLINIC | Age: 63
End: 2023-04-28
Payer: COMMERCIAL

## 2023-04-28 PROCEDURE — J3490M: CUSTOM

## 2023-04-28 PROCEDURE — 64633 DESTROY CERV/THOR FACET JNT: CPT | Mod: RT

## 2023-04-28 PROCEDURE — 64634Z: CUSTOM | Mod: 59,RT

## 2023-04-28 NOTE — HISTORY OF PRESENT ILLNESS
[FreeTextEntry1] : Date of Service: 04/28/2023 \par \par Account: [id number]\par \par Patient: RASHARD GALLEGOS \par \par YOB: 1960\par \par Age: 62 year\par \par \par Surgeon: Genoveva Monroe M.D.\par \par Assistant: None.\par \par Pre-Operative Diagnosis: Spondylosis of Cervical Region without Myelopathy or Radiculopathy (M47.812)\par \par Post Operative Diagnosis: Spondylosis of Cervical Region without Myelopathy or Radiculopathy (M47.812)\par \par Procedure: Right C4,C5,C6,C7 Medial Branch Radiofrequency Neurolysis under fluoroscopic guidance. \par \par Anesthesia:          MAC\par \par \par This procedure was carried out using fluoroscopic guidance.  The risks and benefits of the procedure were discussed extensively with the patient.  The consent of the patient was obtained and the following procedure was performed.\par \par The patient was placed in the prone position.  The patient's neck was prepped and draped in a sterile fashion.  The C4,C5,C6, C7 vertebral levels were identified and the fluoroscope right obliqued to approximately 10 degrees to reveal the "waste" of the right articular pillars at the C4,C5,C6,C7  levels and these were marked.  The skin at these target points was then localized using 1 cc of 1% Lidocaine without epinephrine at each injection site.  A 20 Gauge 100mm tracie needle with a 5mm active curved tip was then introduced and advanced at these three levels to the above target points, and under lateral view the needle tip was advanced a few millimeters and confirmed at the middle of the classical "trapezius" structure on oss. \par \par The stylette for the most cephalad needle at the C4 level was then removed and a Tracie radiofrequency probe was then placed inside the needle. After impedences were checked at approximately 240 ohm, 50 hertz sensory stimulation was performed.  Patient experienced concordant pain in their right neck at approximately 0.6 volts. The voltage was then increased to 1 volt. The patient reported increased left neck pain symptoms without any radiation below the right shoulder.  Stimulation was then changed to 2 hertz and increased slowly by 0.1 volt increments at approximately 0.8 volts, patient began to experience thumping like reproductive pain in their right neck. The voltage was then increased to approximately 2.5 volts. Patient experienced increasing thumping without any sensation below their right shoulder. This exact stimulation was then repeated for the C5,C6,C7 needles reproductive right sided neck pain and no radiation below the right shoulder. The 3 levels were then anesthetized with approximately 0.5 cc's of 1% Lidocaine. After which each area was then ablated at 80 degrees centigrade for 90 seconds each. Patient felt no pain reproduction during the ablation procedure.  After each of these levels were ablated, approximately 0.5 cc of 0.25% Bupivacaine plus 12mg Celestone were then injected before the needles were removed.  Pressure was then applied to the neck.  Band-aids were applied.  Patient was brought to the recovery, ambulated on their own after the procedure and reported decreased right sided neck pain. \par \par Anesthesia personnel were present throughout the procedure. Patient was told to apply ice to the neck for 20 minutes on and 20 minutes off for 24-48 hours. Patient is to call the office if they had any questions or concerns.   The patient was also instructed to contact me immediately if there were any problems.\par \par Genoveva Monroe M.D.\par \par

## 2023-05-02 ENCOUNTER — APPOINTMENT (OUTPATIENT)
Dept: ORTHOPEDIC SURGERY | Facility: CLINIC | Age: 63
End: 2023-05-02

## 2023-05-04 ENCOUNTER — APPOINTMENT (OUTPATIENT)
Dept: ORTHOPEDIC SURGERY | Facility: CLINIC | Age: 63
End: 2023-05-04
Payer: COMMERCIAL

## 2023-05-04 DIAGNOSIS — M77.41 METATARSALGIA, RIGHT FOOT: ICD-10-CM

## 2023-05-04 PROCEDURE — 99214 OFFICE O/P EST MOD 30 MIN: CPT

## 2023-05-04 NOTE — DATA REVIEWED
[MRI] : MRI [Right] : of the right [Foot] : foot [I independently reviewed and interpreted images and report] : I independently reviewed and interpreted images and report [I reviewed the films/CD and agree] : I reviewed the films/CD and agree [FreeTextEntry1] : 4/18/23: 1. Slight patchy edema and atrophy of the musculature plantar to the distal first metatarsal shaft and first MTP \par where there is also mild flexor hallucis tenosynovitis potentially representing findings related to prior surgery.\par 2. Tiny subcortical cysts in the medial base of the first metatarsal, slight marrow edema in the lateral base of the \par proximal phalanx of the first digit potentially related to slight degenerative or inflammatory change, slight \par arthrosis at the plantar third TMT, mild soft tissue swelling dorsal and lateral to the fifth metatarsal shaft, mild \par first web space bursitis, moderate first MTP effusion and synovitis dorsally and subtle marrow edema in the \par distal tuft of the distal phalanx of the fourth digit distally which is nonspecific.\par 3. No Lisfranc ligament tear or malalignment.\par 4. No interdigital neuroma or tendon discontinuity.

## 2023-05-05 ENCOUNTER — APPOINTMENT (OUTPATIENT)
Dept: PAIN MANAGEMENT | Facility: CLINIC | Age: 63
End: 2023-05-05
Payer: COMMERCIAL

## 2023-05-05 PROCEDURE — J3490M: CUSTOM

## 2023-05-05 PROCEDURE — 27096 INJECT SACROILIAC JOINT: CPT | Mod: LT

## 2023-05-05 NOTE — PROCEDURE
[FreeTextEntry3] : Date of Service: 05/05/2023 \par \par Account: [id number]\par \par Patient: RASHARD GALLEGOS \par \par YOB: 1960\par \par Age: 62 year\par \par \par Surgeon:  Genoveva Monroe M.D.\par \par Pre-Operative Diagnosis: \par \par Post Operative Diagnosis: \par \par Procedure: Left Sacroiliac joint injection under fluoroscopic guidance\par \par                     Left Sacroiliac joint arthrogram  \par \par Anesthesia:  MAC\par \par \par This procedure was carried out using fluoroscopic guidance.  The risks and benefits of the procedure were discussed extensively with the patient.  The consent of the patient was obtained and the following procedure was performed.\par \par The patient was placed in the prone position.  The patient's back was prepped and draped in a sterile fashion.  The fluoroscopic image intensifier was then positioned so that anterior and posterior portion of the sacroiliac joint lined up in the same plane and appeared on the image as clear space.  This was achieved with slight cephalad and right medial angulation.  The area corresponding to the left inferior SIJ space was then identified and marked. \par \par The skin and subcutaneous structures were then anesthetized using 1 cc of 1% lidocaine.  A 22 gauge spinal needle was then inserted and directed into the right sacroiliac joint space using fluoroscopic guidance.  After negative aspiration for heme and CSF, 2 cc of Omnipaque was injected and appeared to fill the joint space.  An injectate of 3cc 0.25% marcaine plus 80 mg of Kenalog was then injected into the left sacroiliac joint space.\par \par The needles were then removed and pressure was applied.  Anesthesia personnel were present throughout the procedure.\par \par The patient was instructed to apply ice over the injection site for twenty minutes every two hours for the next 24 to 48 hours.  The patient was also instructed to contact me immediately if there were any problems. \par \par Genoveva Monroe M.D.\par

## 2023-05-23 ENCOUNTER — APPOINTMENT (OUTPATIENT)
Dept: PULMONOLOGY | Facility: CLINIC | Age: 63
End: 2023-05-23

## 2023-05-28 ENCOUNTER — RX RENEWAL (OUTPATIENT)
Age: 63
End: 2023-05-28

## 2023-05-29 ENCOUNTER — APPOINTMENT (OUTPATIENT)
Dept: ORTHOPEDIC SURGERY | Facility: CLINIC | Age: 63
End: 2023-05-29
Payer: COMMERCIAL

## 2023-05-29 VITALS — BODY MASS INDEX: 27.6 KG/M2 | HEIGHT: 62 IN | WEIGHT: 150 LBS

## 2023-05-29 DIAGNOSIS — S90.111A CONTUSION OF RIGHT GREAT TOE W/OUT DAMAGE TO NAIL, INITIAL ENCOUNTER: ICD-10-CM

## 2023-05-29 PROCEDURE — 73630 X-RAY EXAM OF FOOT: CPT | Mod: RT

## 2023-05-29 PROCEDURE — 99213 OFFICE O/P EST LOW 20 MIN: CPT

## 2023-05-29 NOTE — IMAGING
[de-identified] : PE R foot: +swelling to great toe, +tenderness over toe, nail intact, no tenderness at lisfranc area, no tenderness at ankle, EHL/FHL weak due to pain and swelling, sensory intact, calves soft, NT\par  [Right] : right foot [Fracture] : Fracture [de-identified] : ?Distal phalanx fracture

## 2023-05-29 NOTE — ASSESSMENT
[FreeTextEntry1] : A/P R great toe contusion, ?distal phalanx fracture\par - WBAT\par - po tape\par - HSS, pt has at home\par - ice/elevation\par - f/u foot/ankle 1 week\par

## 2023-05-29 NOTE — HISTORY OF PRESENT ILLNESS
[8] : 8 [6] : 6 [Dull/Aching] : dull/aching [Localized] : localized [Sharp] : sharp [Throbbing] : throbbing [Constant] : constant [Standing] : standing [Walking] : walking [Stairs] : stairs [Retired] : Work status: retired [de-identified] : 63 y/o F s/p fall with R toe pain after stubbing it on the sidewalk yesterday. She was able to ambulate after event though with pain. denies any other pain or injury. denies numbness/tingling.\par  [] : Post Surgical Visit: no [FreeTextEntry1] : Rt toe [FreeTextEntry3] : 5/29/23 [FreeTextEntry5] : Patient stubbed her Rt big toe on the sidewalk walking her dog yesterday 5/29/23 no prior hx

## 2023-06-02 ENCOUNTER — APPOINTMENT (OUTPATIENT)
Dept: PAIN MANAGEMENT | Facility: CLINIC | Age: 63
End: 2023-06-02

## 2023-06-02 NOTE — PHYSICAL EXAM
[Rotation to left] : rotation to left [Rotation to right] : rotation to right [de-identified] : left lateral rotation 75 degrees [TWNoteComboBox6] : right lateral rotation 75 degrees [] : positive Zamora maneuver facet loading

## 2023-06-02 NOTE — PROCEDURE
[Left] : of the left [Cervical paraspinal muscle] : cervical paraspinal muscle [Thoracic paraspinal muscle] : thoracic paraspinal muscle [FreeTextEntry3] : Date of Service: 05/05/2023 \par \par Account: [id number]\par \par Patient: RASHARD GALLEGOS \par \par YOB: 1960\par \par Age: 62 year\par \par \par Surgeon:  Genoveva Monroe M.D.\par \par Pre-Operative Diagnosis: \par \par Post Operative Diagnosis: \par \par Procedure: Left Sacroiliac joint injection under fluoroscopic guidance\par \par                     Left Sacroiliac joint arthrogram  \par \par Anesthesia:  MAC\par \par \par This procedure was carried out using fluoroscopic guidance.  The risks and benefits of the procedure were discussed extensively with the patient.  The consent of the patient was obtained and the following procedure was performed.\par \par The patient was placed in the prone position.  The patient's back was prepped and draped in a sterile fashion.  The fluoroscopic image intensifier was then positioned so that anterior and posterior portion of the sacroiliac joint lined up in the same plane and appeared on the image as clear space.  This was achieved with slight cephalad and right medial angulation.  The area corresponding to the left inferior SIJ space was then identified and marked. \par \par The skin and subcutaneous structures were then anesthetized using 1 cc of 1% lidocaine.  A 22 gauge spinal needle was then inserted and directed into the right sacroiliac joint space using fluoroscopic guidance.  After negative aspiration for heme and CSF, 2 cc of Omnipaque was injected and appeared to fill the joint space.  An injectate of 3cc 0.25% marcaine plus 80 mg of Kenalog was then injected into the left sacroiliac joint space.\par \par The needles were then removed and pressure was applied.  Anesthesia personnel were present throughout the procedure.\par \par The patient was instructed to apply ice over the injection site for twenty minutes every two hours for the next 24 to 48 hours.  The patient was also instructed to contact me immediately if there were any problems. \par \par Genoveva Monroe M.D.\par

## 2023-06-02 NOTE — HISTORY OF PRESENT ILLNESS
[FreeTextEntry1] : 6/5/23- fu\par \par 11/11/2022: follow up today after right cervical RFA on 10/24/22. Had 80% relief so far. Now pain is in left SI joint will repeat injection.  \par \par 08/18/2022: follow up today after left cervical RFA on 7/22/22.  Had 50% so far.  Has not had as much improvement as in past.  Also complains of pain in thoracic area.  Will give TPI\par \par 7/15/22-  L LS MBB without relief;  c/o pain in lower back, mainly L sided with cramping in L ankle ; consider caudal michael prior L upper cerv RFA with >70% relief sustained several months with improved rom and adls, will repeat. Has been on Topamax in the past with good relief. \par \par 3/25/22: follow up today to MRI lumbar spine: (3/17/22) Impression:\par 1. Mild central stenosis with bilateral exiting L3 nerve root impingement at L3-L4, scoliosis, and postoperative changes\par without recurrent disc herniation at L4-L5.\par 2. Multilevel degenerative disc disease and posterior protrusion at T11-T12 with hemangioma at L1 and multilevel\par foraminal narrowing.\par 3. No acute fracture, discitis, or enhancing postoperative fluid collection on postcontrast imaging.\par 4. Clinical correlation regarding prior surgical history is recommended.\par has pain in the right foot when she puts weight on it. Thought initially it was a neuroma. however ruled out with\par imaging.\par \par Had fusion L4/5 in September 2020. I would recommend EMG/NCV>\par She would like to repeat cervical RFA as well.\par \par 3/9/22: follow up toady. Pain in the left trap. yesterday she bent down and she felt a pop. now when she bends she feels like her back is going to give out. difficulty with change of position. Pain in the left lower back. Pain radiates down the posterior left leg.\par \par 8/13/21: follow up today after left SIJ injection on 7/23/21. She reports great relief initially and pain recurred about 3-4 days later. takes flexeril PRN (makes her sleepy) Pain under the left shoulder blade. Had CHERYL's in the past with relief. Consider left SIJ RFA.\par \par 7/23/21: follow up today after right cervical RFA on 7/8/21. patient starting to feel better. Since last visit had CT scan and met with Dr. Hunter Chow who recommended a Left SIJ injection.\par \par 2/26/21: follow up today after right cervical RFA on 11/13/21. Had 60% improvement but now the left side of neck is hurting again. MRI 10/20- S/P L4-L5 laminectomy . Hardware intact\par Will give TPI today and schedule repeat RFA on left. SCS info given\par \par 8.14.20: follow up today after left cervical RFA. Pt with some improvement. I would recommend she give it another 4 weeks. I would recommend right cervical RFA as it has helped in the past.\par \par 7.3.20 - follow up today after left L5/S1 and S1 and CHERYL. The neck improved about 50%.Has left lower back pain. sitting aggravated her left lower back. numbness in the legs. last left lumbar RFA was in September. [Neck] : neck [Lower back] : lower back [3] : 3 [0] : 0 [Dull/Aching] : dull/aching [Intermittent] : intermittent [Sleep] : sleep [Rest] : rest [Meds] : meds [Ice] : ice [Heat] : heat [Injection therapy] : injection therapy [Standing] : standing [Walking] : walking [Bending forward] : bending forward [Exercising] : exercising [Retired] : Work status: retired [] : yes [FreeTextEntry7] : to the left side [FreeTextEntry9] : Motrin  [de-identified] : Driving [TWNoteComboBox1] : 50%

## 2023-06-05 ENCOUNTER — APPOINTMENT (OUTPATIENT)
Dept: PAIN MANAGEMENT | Facility: CLINIC | Age: 63
End: 2023-06-05

## 2023-06-16 ENCOUNTER — RX RENEWAL (OUTPATIENT)
Age: 63
End: 2023-06-16

## 2023-06-23 ENCOUNTER — APPOINTMENT (OUTPATIENT)
Dept: ORTHOPEDIC SURGERY | Facility: CLINIC | Age: 63
End: 2023-06-23
Payer: COMMERCIAL

## 2023-06-23 ENCOUNTER — APPOINTMENT (OUTPATIENT)
Dept: PULMONOLOGY | Facility: CLINIC | Age: 63
End: 2023-06-23

## 2023-06-23 VITALS — WEIGHT: 150 LBS | HEIGHT: 62 IN | BODY MASS INDEX: 27.6 KG/M2

## 2023-06-23 DIAGNOSIS — S86.112A STRAIN OF OTHER MUSCLE(S) AND TENDON(S) OF POSTERIOR MUSCLE GROUP AT LOWER LEG LEVEL, LEFT LEG, INITIAL ENCOUNTER: ICD-10-CM

## 2023-06-23 PROCEDURE — 73562 X-RAY EXAM OF KNEE 3: CPT | Mod: LT

## 2023-06-23 PROCEDURE — 99213 OFFICE O/P EST LOW 20 MIN: CPT

## 2023-06-23 NOTE — DISCUSSION/SUMMARY
[de-identified] : Pt is s/p left revision TKR which is functioning well. She appears to have a lateral gastroc strain causing her current symptoms. The discomfort to her musculature may also be correlated to her lower back condition. I reviewed x-rays with them. I have reassured them that their implants are functioning well.\par \par She is encouraged to continue to stay active with PT for muscle strengthening.\par \par Patient can continue activities as tolerated. All questions answered, understanding verbalized. Patient in agreement with plan of care.\par \par I will see her back in 6 months, sooner if they have any acute symptoms.

## 2023-06-23 NOTE — PHYSICAL EXAM
[de-identified] : General appearance: well nourished and hydrated, pleasant, alert and oriented x 3, cooperative.\par HEENT: Normocephalic, EOM intact, Nasal septum midline, Oral cavity clear, External auditory canal clear.\par Cardiovascular: no apparent abnormalities, no lower leg edema, no varicosities, pedal pulses are palpable.\par Lymphatics Lymph nodes: none palpated, Lymphedema: not present.\par Neurologic: sensation is normal, no muscle weakness in upper or lower extremities, patella tendon reflexes intact .\par Dermatologic no apparent skin lesions, moist, warm, no rash.\par Spine:cervical spine appears normal and moves freely, thoracic spine appears normal and moves freely, lumbosacral spine appears normal and moves freely.\par Gait: nonantalgic.\par \par Left Knee\par Inspection: no effusion\par Wounds: healed midline incision\par Alignment: normal.\par Palpation: lateral gastroc muscle belly an lateral gastroc head tenderness on palpation.\par ROM: Active (in degrees): 0-125\par Ligamentous laxity (neg): negative ant. drawer test, negative post. drawer test, stable to varus stress test, stable to valgus stress test,\par Patellofemoral Alignment Test: Q angle-, normal.\par Muscle Test: good quad strength.\par Leg examination: calf is soft and non-tender.  [de-identified] : Left knee x-ray, AP, lateral, merchant view taken at the office today demonstrates a revision total knee replacement in satisfactory position and alignment. No evidence of loosening. The patella sits in a central position.\par \par Right knee x-ray merchant view taken at the office today demonstrates good joint space and a well centered patella.

## 2023-06-23 NOTE — ADDENDUM
[FreeTextEntry1] : This note was written by JARROD CABELLO on 06/23/2023 acting as scribe for Dr. Stephan Salvador M.D.\par \par I, Dr. Stephan Salvador, have read and attest that all the information, medical decision making and discharge instructions within are true and accurate. \par \par This note was written by Keith Gonzalez on 06/23/2023 acting as scribe for Dr. Stephan Salvador M.D.\par \par ONI, Dr. Stephan Salvador, have read and attest that all the information, medical decision making and discharge instructions within are true and accurate.

## 2023-06-23 NOTE — HISTORY OF PRESENT ILLNESS
[de-identified] : RASHARD GALLEGOS 62 year old female presents for follow-up evaluation s/p LEFT revision TKR. She is doing PT. She is having continued pain and stiffness mostly on the lateral and posterior aspects of her knee. Her pain is worsening with a few buckling episodes. On memorial day weekend, she was walking her dog and she may have twisted her knee while trying to prevent a fall. She went to [facility] where she was prescribed tape and a boot for 2 weeks. She is unsure of this is contributing to her knee pain. She was advised not to exercise at the gym but has since  been cleared. For exercise, she walks, bikes, and does stretches. She takes Motrin as needed. She is seeing a pain management doctor for her back; receiving cortisone injections every so often and ablations for her neck every 6 months.

## 2023-06-28 ENCOUNTER — APPOINTMENT (OUTPATIENT)
Dept: PAIN MANAGEMENT | Facility: CLINIC | Age: 63
End: 2023-06-28
Payer: COMMERCIAL

## 2023-06-28 VITALS — HEIGHT: 62 IN | WEIGHT: 150 LBS | BODY MASS INDEX: 27.6 KG/M2

## 2023-06-28 DIAGNOSIS — M47.812 SPONDYLOSIS W/OUT MYELOPATHY OR RADICULOPATHY, CERVICAL REGION: ICD-10-CM

## 2023-06-28 PROCEDURE — 99214 OFFICE O/P EST MOD 30 MIN: CPT

## 2023-06-28 NOTE — ASSESSMENT
[FreeTextEntry1] : After discussing various treatment options with the patient including but not limited to oral medications, physical therapy, exercise, modalities as well as interventional spinal injections, we have decided with the following plan:\par \par 1) Intervention Injection Therapy:\par I personally reviewed the MRI/CT scan images and agree with the radiologist's report. The radiological findings were discussed with the patient.\par The risks, benefits, contents and alternatives to injection were explained in full to the patient. Risks outlined include but are not limited to infection,sepsis, bleeding, post-dural puncture headache, nerve damage, temporary increase in pain, syncopal episode, failure to resolve symptoms, allergic reaction, symptom recurrence, and elevation of blood sugar in diabetics. Cortisone may cause immunosuppression. Patient understands the risks. All questions were answered. After discussion of options, patient requested an injection. Information regarding the injection was given to the patient. Which medications to stop prior to the injection was explained to the patient as well.\par \par Follow up in 1-2 weeks post injection for re-evaluation. \par Continue Home exercises, stretching, activity modification, physical therapy, and conservative care.\par \par Patient has cervical axial pain that is consistent with facet joint pathology.  A diagnostic temporary block with local anesthetic  of the medial branch was performed and has resulted in at least a 80% reduction in pain for the duration of the specific local anesthetic effect.  The pain is not radicular and there is absence of nerve root compression.  There is no prior spinal fusion surgery at the level targeted.  The pain has failed to respond to three months of conservative therapy.\par \par \par 2) I would recommend a trial of neuropathic medication as patient presents with signs of nerve irritation. (ie burning, paresthesias etc) Goals of therapy would be to improve pain and overall QOL. Side effects reviewed with patient. Patient will call or stop medication if given side effects occur. \par \par SI joint left- will call\par MRI Cercial

## 2023-06-28 NOTE — HISTORY OF PRESENT ILLNESS
[Neck] : neck [Lower back] : lower back [3] : 3 [0] : 0 [Dull/Aching] : dull/aching [Intermittent] : intermittent [Sleep] : sleep [Rest] : rest [Meds] : meds [Ice] : ice [Heat] : heat [Injection therapy] : injection therapy [Standing] : standing [Walking] : walking [Bending forward] : bending forward [Exercising] : exercising [Retired] : Work status: retired [Tingling] : tingling [FreeTextEntry1] : 6/28/23- fu for Left SI joint injection on 5/5.  C/o pain and numbness in left arm.  Would recommend a repeat MRI.  \par \par 11/11/2022: follow up today after right cervical RFA on 10/24/22. Had 80% relief so far. Now pain is in left SI joint will repeat injection.  \par \par 08/18/2022: follow up today after left cervical RFA on 7/22/22.  Had 50% so far.  Has not had as much improvement as in past.  Also complains of pain in thoracic area.  Will give TPI\par \par 7/15/22-  L LS MBB without relief;  c/o pain in lower back, mainly L sided with cramping in L ankle ; consider caudal michael prior L upper cerv RFA with >70% relief sustained several months with improved rom and adls, will repeat. Has been on Topamax in the past with good relief. \par \par 3/25/22: follow up today to MRI lumbar spine: (3/17/22) Impression:\par 1. Mild central stenosis with bilateral exiting L3 nerve root impingement at L3-L4, scoliosis, and postoperative changes\par without recurrent disc herniation at L4-L5.\par 2. Multilevel degenerative disc disease and posterior protrusion at T11-T12 with hemangioma at L1 and multilevel\par foraminal narrowing.\par 3. No acute fracture, discitis, or enhancing postoperative fluid collection on postcontrast imaging.\par 4. Clinical correlation regarding prior surgical history is recommended.\par has pain in the right foot when she puts weight on it. Thought initially it was a neuroma. however ruled out with\par imaging.\par \par Had fusion L4/5 in September 2020. I would recommend EMG/NCV>\par She would like to repeat cervical RFA as well.\par \par 3/9/22: follow up toady. Pain in the left trap. yesterday she bent down and she felt a pop. now when she bends she feels like her back is going to give out. difficulty with change of position. Pain in the left lower back. Pain radiates down the posterior left leg.\par \par 8/13/21: follow up today after left SIJ injection on 7/23/21. She reports great relief initially and pain recurred about 3-4 days later. takes flexeril PRN (makes her sleepy) Pain under the left shoulder blade. Had CHERYL's in the past with relief. Consider left SIJ RFA.\par \par 7/23/21: follow up today after right cervical RFA on 7/8/21. patient starting to feel better. Since last visit had CT scan and met with Dr. Hunter Chow who recommended a Left SIJ injection.\par \par 2/26/21: follow up today after right cervical RFA on 11/13/21. Had 60% improvement but now the left side of neck is hurting again. MRI 10/20- S/P L4-L5 laminectomy . Hardware intact\par Will give TPI today and schedule repeat RFA on left. SCS info given\par \par 8.14.20: follow up today after left cervical RFA. Pt with some improvement. I would recommend she give it another 4 weeks. I would recommend right cervical RFA as it has helped in the past.\par \par 7.3.20 - follow up today after left L5/S1 and S1 and CHERYL. The neck improved about 50%.Has left lower back pain. sitting aggravated her left lower back. numbness in the legs. last left lumbar RFA was in September. [] : no [FreeTextEntry6] : numbness  [FreeTextEntry7] : to the left side [FreeTextEntry9] : Motrin  [de-identified] : Driving [TWNoteComboBox1] : 50%

## 2023-06-28 NOTE — PHYSICAL EXAM
[Rotation to left] : rotation to left [Rotation to right] : rotation to right [] : no ecchymosis [de-identified] : left lateral rotation 75 degrees [TWNoteComboBox6] : right lateral rotation 75 degrees

## 2023-06-28 NOTE — PROCEDURE
[Thoracic paraspinal muscle] : thoracic paraspinal muscle [FreeTextEntry3] : \par \par \par \par \par \par

## 2023-07-04 ENCOUNTER — FORM ENCOUNTER (OUTPATIENT)
Age: 63
End: 2023-07-04

## 2023-07-05 ENCOUNTER — APPOINTMENT (OUTPATIENT)
Dept: MRI IMAGING | Facility: CLINIC | Age: 63
End: 2023-07-05
Payer: COMMERCIAL

## 2023-07-05 ENCOUNTER — APPOINTMENT (OUTPATIENT)
Dept: MRI IMAGING | Facility: CLINIC | Age: 63
End: 2023-07-05

## 2023-07-05 ENCOUNTER — APPOINTMENT (OUTPATIENT)
Dept: PULMONOLOGY | Facility: CLINIC | Age: 63
End: 2023-07-05
Payer: COMMERCIAL

## 2023-07-05 ENCOUNTER — APPOINTMENT (OUTPATIENT)
Dept: PULMONOLOGY | Facility: CLINIC | Age: 63
End: 2023-07-05

## 2023-07-05 ENCOUNTER — NON-APPOINTMENT (OUTPATIENT)
Age: 63
End: 2023-07-05

## 2023-07-05 VITALS
DIASTOLIC BLOOD PRESSURE: 84 MMHG | TEMPERATURE: 97.1 F | WEIGHT: 150 LBS | RESPIRATION RATE: 16 BRPM | HEART RATE: 90 BPM | HEIGHT: 62 IN | OXYGEN SATURATION: 96 % | SYSTOLIC BLOOD PRESSURE: 118 MMHG | BODY MASS INDEX: 27.6 KG/M2

## 2023-07-05 DIAGNOSIS — U07.1 COVID-19: ICD-10-CM

## 2023-07-05 DIAGNOSIS — Z01.811 ENCOUNTER FOR PREPROCEDURAL RESPIRATORY EXAMINATION: ICD-10-CM

## 2023-07-05 PROCEDURE — 99214 OFFICE O/P EST MOD 30 MIN: CPT | Mod: 25

## 2023-07-05 PROCEDURE — 95012 NITRIC OXIDE EXP GAS DETER: CPT

## 2023-07-05 PROCEDURE — 94010 BREATHING CAPACITY TEST: CPT

## 2023-07-05 PROCEDURE — 72141 MRI NECK SPINE W/O DYE: CPT

## 2023-07-05 NOTE — PHYSICAL EXAM
[No Acute Distress] : no acute distress [Well Nourished] : well nourished [No Deformities] : no deformities [Well Developed] : well developed [II] : Mallampati Class: II [TextBox_2] : bruises on arm and leg  [TextBox_68] : I:E ratio 1:3; clear

## 2023-07-05 NOTE — PROCEDURE
[FreeTextEntry1] : PFT reveals normal flows, with an FEV1 of 1.97 L, which is 87% of predicted, with a normal flow volume loop.\par PFTs were performed to evaluate for asthma \par \par FENO was 12; a normal value being less than 25\par Fractional exhaled nitric oxide (FENO) is regarded as a simple, noninvasive method for assessing eosinophilic airway inflammation. Produced by a variety of cells within the lung, nitric oxide (NO) concentrations are generally low in healthy individuals. However, high concentrations of NO appear to be involved in nonspecific host defense mechanisms and chronic inflammatory diseases such as asthma. The American Thoracic Society (ATS) therefore has recommended using FENO to aid in the diagnosis and monitoring of eosinophilic airway inflammation and asthma, and for identifying steroid responsive individuals whose chronic respiratory symptoms may be caused by airway inflammation.

## 2023-07-05 NOTE — ASSESSMENT
[FreeTextEntry1] : Ms. Dickerson is a 62 year old female with vertigo, fibromyalgia, asthma, allergies, GERD, OSAS and mild SOB (non-compliant) presenting to the office today for a follow up pulmonary evaluation. Her number one issue is orthopedic issues \par \par problem 1: asthma- controlled \par -continue to use Levalbuterol  0.63% by the nebulizer q6H\par -continue to use Accolate 20 mg BID \par -continue Trelegy 1 puff QD  \par -continue ProAir 2 puffs Q6H, pre-exercise\par -continue to use Ventolin PRN \par -Asthma is  believed to be caused by inherited (genetic) and environmental factor, but its exact cause is unknown. Asthma may be triggered by allergens, lung infections, or irritants in the air. Asthma triggers are different for each person\par -Inhaler technique reviewed as well as oral hygiene techniques reviewed with patient. Avoidance of cold air, extremes of temperature, rescue inhaler should be used before exercise. Order of medication reviewed with patient. Recommended use of a cool mist humidifier in the bedroom.\par \par problem 2: allergic rhinitis - controlled\par -continue Xhance 1 sniff BID \par -continue to use nasal saline/Xlear; Navage nasal spray\par -can continue OTC antihistamine PRN \par -continue to use Astelin (.15) 1 sniff/nostril BID \par -Environmental measures for allergies were encouraged including mattress and pillow cover, air purifier, and environmental controls. \par \par problem 3: acute/chronic sinusitis -Nasal Polyps \par -recommended CT of sinuses w/o contrast\par -Consider Dupixent. \par -Dupixent is a prescription medicine used with other asthma medicines for the maintenance treatment of moderate-to-severe asthma in people aged 12 years and older whose asthma is not controlled with their current asthma medicines. Dupixent helps prevent severe asthma attacks (exacerbations) and can improve your breathing. Dupixent may also help reduce the amount of oral corticosteroids you need while preventing severe asthma attacks and improving your breathing. Dupixent is not used to treat sudden breathing problems. Risks and side effects of Dupixent were discussed and reviewed with patient. \par \par problem 4: CRYSTAL (on Rx) \par -recommended Epsom salt bath QHS \par -Dental Device ([place])\par -recommended positional sleep \par -recommended to use "Chin Strap" \par -recommended to use extended release melatonin \par -Sleep apnea is associated with adverse clinical consequences which an affect most organ systems.  Cardiovascular disease risk includes arrhythmias, atrial fibrillation, hypertension, coronary artery disease, and stroke. Metabolic disorders include diabetes type 2, non-alcoholic fatty liver disease. Mood disorder especially depression; and cognitive decline especially in the elderly. Associations with  chronic reflux/Brothers’s esophagus some but not all inclusive. \par -Reasons to assess this include arousal consistent with hypopnea; respiratory events most prominent in REM sleep or supine position; therefore sleep staging and body position are important for accurate diagnosis and estimation of AHI.\par -Treatment options discussed including CPAP/BiPAP machine, oral appliance, ProVent therapy, Oxy-Aid by [employer], new technologies, or positional sleep.Recommended use of the CPAP machine for moderate (AHI >15), moderate to severe (AHI 15-30) and severe patients (AHI > 30). Recommended weight loss which can reduce AHI especially in weight loss of greater than 5% of BMI. Positional sleep is recommended in those with low AHI, low-moderate BMI, and younger age. For severe sleep apnea, the hypoglossal nerve stimulator was recommended as well.\par \par problem 5: GERD ([place]) \par -continue AcipHex 20 mg before breakfast\par -Rule of 2s: avoid eating too much, eating too late, eating too spicy, eating two hours before bed\par -Things to avoid including overeating, spicy foods, tight clothing, eating within three hours of bed, this list is not all inclusive. \par -For treatment of reflux, possible options discussed including diet control, H2 blockers, PPIs, as well as coating motility agents discussed as treatment options. Timing of meals and proximity of last meal to sleep were discussed. If symptoms persist, a formal gastrointestinal evaluation is needed.\par \par problem 6: atypical chest pain (resolved) \par -felt to be related to reflux and anxiety \par \par problem 7: anxiety \par -recommended to read: "The Gift of Maybe," by Rebeca Aguilar \par \par problem 8: overweight\par -currently working with [employer] \par -recommended to try WW. \par Weight loss, exercise, and diet control were discussed and are highly encouraged. Treatment options were given such as, aqua therapy, and contacting a nutritionist. Recommended to use the elliptical, stationary bike, less use of treadmill. Mindful eating was explained to the patient Obesity is associated with worsening asthma, shortness of breath, and potential for cardiac disease, diabetes, and other underlying medical conditions. \par \par Problem 9: Health Maintenance/COVID19 Precautions: (COVID-19 x2 12/2021, 8/2022)\par -s/p COVID-19 vaccine x3 12/2021\par - Clean your hands often. Wash your hands often with soap and water for at least 20 seconds, especially after blowing your nose, coughing, or sneezing, or having been in a public place.\par - If soap and water are not available, use a hand  that contains at least 60% alcohol.\par - To the extent possible, avoid touching high-touch surfaces in public places - elevator buttons, door handles, handrails, handshaking with people, etc. Use a tissue or your sleeve to cover your hand or finger if you must touch something.\par - Wash your hands after touching surfaces in public places.\par - Avoid touching your face, nose, eyes, etc.\par - Clean and disinfect your home to remove germs: practice routine cleaning of frequently touched surfaces (for example: tables, doorknobs, light switches, handles, desks, toilets, faucets, sinks & cell phones)\par - Avoid crowds, especially in poorly ventilated spaces. Your risk of exposure to respiratory viruses like COVID-19 may increase in crowded, closed-in settings with little air circulation if\par there are people in the crowd who are sick. All patients are recommended to practice social distancing and stay at least 6 feet away from others.\par - Avoid all non-essential travel including plane trips, and especially avoid embarking on cruise ships.\par -If COVID-19 is spreading in your community, take extra measures to put distance between yourself and other people to further reduce your risk of being exposed to this new virus.\par -Stay home as much as possible.\par - Consider ways of getting food brought to your house through family, social, or commercial networks\par -Be aware that the virus has been known to live in the air up to 3 hours post exposure, cardboard up to 24 hours post exposure, copper up to 4 hours post exposure, steel and plastic up to 2-3 days post exposure. Risk of transmission from these surfaces are affected by many variables.\par \par problem 10: health maintenance \par -recommended toprcin cream \par -recommended SPM and quercetin \par -s/p yearly flu shot 9/3/2022\par -recommended strep pneumonia vaccines: Prevnar-13 vaccine, followed by Pneumo vaccine 23 one year following\par -recommended early intervention for URIs\par -recommended regular osteoporosis evaluations\par -recommended early dermatological evaluations\par -recommended after the age of 50 to the age of 70, colonoscopy every 5 years \par \par F/U in 6 months SPI and [facility].\par She is encouraged to call with any changes, concerns, or questions.

## 2023-07-05 NOTE — ADDENDUM
[FreeTextEntry1] : Documented by Zenia Lux acting as a scribe for Dr. Mathew Rao on 07/05/2023 .\par \par All medical record entries made by the Scribe were at my, Dr. Mathew Rao's, direction and personally dictated by me on 07/05/2023. I have reviewed the chart and agree that the record accurately reflects my personal performance of the history, physical exam, assessment and plan. I have also personally directed, reviewed, and agree with the discharge instructions.

## 2023-07-05 NOTE — HISTORY OF PRESENT ILLNESS
[FreeTextEntry1] : Ms. Dickerson is a 62 year old female with a history of allergic rhinitis, asthma, atypical chest pain, GERD, CRYSTAL, and SOB presenting to the office today for a follow up visit. His chief complaint is\par \par -she notes knee issues and pending follow up post knee surgery \par -she notes walking for exercise but limited due to knee issues\par -she notes attending exercise classes\par -she notes struggling to lose weight \par -she notes appetite and diet is stable \par -she notes intermittent headaches when humidity is high\par -she notes severe headaches for 2 days\par -she notes vision is stable \par -she notes intermittent morning aphonia\par -she notes use of dental appliance \par -she notes sleep improves with dental appliance \par -she notes on Rx for anxiety and depression\par -she notes shaking when anxiety is severe  \par -she notes breathing is stable \par -she denies sinus issues \par \par -she denies any headaches, nausea, emesis, fever, chills, sweats, chest pain, chest pressure, coughing, wheezing, palpitations, constipation, diarrhea, vertigo, dysphagia, heartburn, reflux, itchy eyes, itchy ears, or sour taste in the mouth.

## 2023-07-17 NOTE — OCCUPATIONAL THERAPY INITIAL EVALUATION ADULT - WEIGHT-BEARING RESTRICTIONS: TOILET, REHAB EVAL
PHYSICAL / OCCUPATIONAL THERAPY - DAILY TREATMENT NOTE (updated )    Patient Name: Leandro Vick    Date: 2023    : 1972  Insurance: Payor: [place] / Plan: Clinton Rivas / Product Type: *No Product type* /      Patient  verified Yes   Visit #   Current / Total 19 24   Time   In / Out 11:00 11:50   Pain   In / Out 0-2 0-2   Subjective Functional Status/Changes: Pt reports that the shoulder is very tired and a little sore from having to do a lot of painting and pressure washing recently. Pt states that hes not having a ton of pain just feels like its been worked     TREATMENT AREA =  Pain in right shoulder [M25.511]    OBJECTIVE    Modalities Rationale:     decrease inflammation and decrease pain to improve patient's ability to progress to PLOF and address remaining functional goals. min [] Estim Unattended, type/location:                                      []  w/ice    []  w/heat    min [] Estim Attended, type/location:                                     []  w/US     []  w/ice    []  w/heat    []  TENS insruct      min []  Mechanical Traction: type/lbs                   []  pro   []  sup   []  int   []  cont    []  before manual    []  after manual    min []  Ultrasound, settings/location:      min []  Iontophoresis w/ dexamethasone, location:                                               []  take home patch       []  in clinic    min  unbill []  Ice     []  Heat    location/position:     min []  Paraffin,  details:     min []  Vasopneumatic Device, press/temp:     min []  Lucy Sykes / Shaheed Sim: If using vaso (only need to measure limb vaso being performed on)      pre-treatment girth :       post-treatment girth :       measured at (landmark location) :      min []  Other:    Skin assessment post-treatment (if applicable):    []  intact    []  redness- no adverse reaction                 []redness - adverse reaction:         Therapeutic Procedures:   Tx Min Billable or 1:1 Min
weight-bearing as tolerated

## 2023-07-26 ENCOUNTER — APPOINTMENT (OUTPATIENT)
Dept: PAIN MANAGEMENT | Facility: CLINIC | Age: 63
End: 2023-07-26
Payer: COMMERCIAL

## 2023-07-26 VITALS — HEIGHT: 62 IN | BODY MASS INDEX: 28.16 KG/M2 | WEIGHT: 153 LBS

## 2023-07-26 PROCEDURE — 99214 OFFICE O/P EST MOD 30 MIN: CPT

## 2023-07-26 NOTE — HISTORY OF PRESENT ILLNESS
[Neck] : neck [Lower back] : lower back [3] : 3 [0] : 0 [Dull/Aching] : dull/aching [Tingling] : tingling [Intermittent] : intermittent [Sleep] : sleep [Rest] : rest [Meds] : meds [Ice] : ice [Heat] : heat [Injection therapy] : injection therapy [Standing] : standing [Walking] : walking [Bending forward] : bending forward [Exercising] : exercising [Retired] : Work status: retired [FreeTextEntry1] : 7/26/23- MRI cervical spine (7/5/23) 1. Reversal of the cervical lordosis and multilevel spondylolisthesis.\par 2. Encroachment upon the cord and bilateral exiting nerve roots at C4-C5, impingement upon right greater than \par left exiting C7 nerve roots with central stenosis and broad-based posterior disc herniation at C6-C7, and \par encroachment upon bilateral exiting C8 nerve roots at C7-T1.\par 3. Multilevel degenerative endplate changes and multilevel uncovertebral hypertrophy without acute fracture or \par cord compression.\par \par Pain in the neck with radiation to the shoulders. \par \par 6/28/23- fu for Left SI joint injection on 5/5.  C/o pain and numbness in left arm.  Would recommend a repeat MRI.  \par \par 11/11/2022: follow up today after right cervical RFA on 10/24/22. Had 80% relief so far. Now pain is in left SI joint will repeat injection.  \par \par 08/18/2022: follow up today after left cervical RFA on 7/22/22.  Had 50% so far.  Has not had as much improvement as in past.  Also complains of pain in thoracic area.  Will give TPI\par \par 7/15/22-  L LS MBB without relief;  c/o pain in lower back, mainly L sided with cramping in L ankle ; consider caudal michael prior L upper cerv RFA with >70% relief sustained several months with improved rom and adls, will repeat. Has been on Topamax in the past with good relief. \par \par 3/25/22: follow up today to MRI lumbar spine: (3/17/22) Impression:\par 1. Mild central stenosis with bilateral exiting L3 nerve root impingement at L3-L4, scoliosis, and postoperative changes\par without recurrent disc herniation at L4-L5.\par 2. Multilevel degenerative disc disease and posterior protrusion at T11-T12 with hemangioma at L1 and multilevel\par foraminal narrowing.\par 3. No acute fracture, discitis, or enhancing postoperative fluid collection on postcontrast imaging.\par 4. Clinical correlation regarding prior surgical history is recommended.\par has pain in the right foot when she puts weight on it. Thought initially it was a neuroma. however ruled out with\par imaging.\par \par Had fusion L4/5 in September 2020. I would recommend EMG/NCV>\par She would like to repeat cervical RFA as well.\par \par 3/9/22: follow up toady. Pain in the left trap. yesterday she bent down and she felt a pop. now when she bends she feels like her back is going to give out. difficulty with change of position. Pain in the left lower back. Pain radiates down the posterior left leg.\par \par 8/13/21: follow up today after left SIJ injection on 7/23/21. She reports great relief initially and pain recurred about 3-4 days later. takes flexeril PRN (makes her sleepy) Pain under the left shoulder blade. Had CHERYL's in the past with relief. Consider left SIJ RFA.\par \par 7/23/21: follow up today after right cervical RFA on 7/8/21. patient starting to feel better. Since last visit had CT scan and met with Dr. Hunter Chow who recommended a Left SIJ injection.\par \par 2/26/21: follow up today after right cervical RFA on 11/13/21. Had 60% improvement but now the left side of neck is hurting again. MRI 10/20- S/P L4-L5 laminectomy . Hardware intact\par Will give TPI today and schedule repeat RFA on left. SCS info given\par \par 8.14.20: follow up today after left cervical RFA. Pt with some improvement. I would recommend she give it another 4 weeks. I would recommend right cervical RFA as it has helped in the past.\par \par 7.3.20 - follow up today after left L5/S1 and S1 and CHERYL. The neck improved about 50%.Has left lower back pain. sitting aggravated her left lower back. numbness in the legs. last left lumbar RFA was in September. [] : no [FreeTextEntry6] : numbness  [FreeTextEntry7] : to the left side [FreeTextEntry9] : Motrin  [de-identified] : Driving [TWNoteComboBox1] : 50%

## 2023-07-26 NOTE — ASSESSMENT
[FreeTextEntry1] : After discussing various treatment options with the patient including but not limited to oral medications, physical therapy, exercise, modalities as well as interventional spinal injections, we have decided with the following plan:\par \par 1) Intervention Injection Therapy:\par I personally reviewed the MRI/CT scan images and agree with the radiologist's report. The radiological findings were discussed with the patient.\par The risks, benefits, contents and alternatives to injection were explained in full to the patient. Risks outlined include but are not limited to infection,sepsis, bleeding, post-dural puncture headache, nerve damage, temporary increase in pain, syncopal episode, failure to resolve symptoms, allergic reaction, symptom recurrence, and elevation of blood sugar in diabetics. Cortisone may cause immunosuppression. Patient understands the risks. All questions were answered. After discussion of options, patient requested an injection. Information regarding the injection was given to the patient. Which medications to stop prior to the injection was explained to the patient as well.\par \par Follow up in 1-2 weeks post injection for re-evaluation. \par Continue Home exercises, stretching, activity modification, physical therapy, and conservative care.\par \par Patient has cervical axial pain that is consistent with facet joint pathology.  A diagnostic temporary block with local anesthetic  of the medial branch was performed and has resulted in at least a 80% reduction in pain for the duration of the specific local anesthetic effect.  The pain is not radicular and there is absence of nerve root compression.  There is no prior spinal fusion surgery at the level targeted.  The pain has failed to respond to three months of conservative therapy.\par \par \par 2) I would recommend a trial of neuropathic medication as patient presents with signs of nerve irritation. (ie burning, paresthesias etc) Goals of therapy would be to improve pain and overall QOL. Side effects reviewed with patient. Patient will call or stop medication if given side effects occur. \par \par SI joint left- will call\par C7-T1 Cervical Epidural Steroid Injection under fluoroscopic guidance with image. Of note, the C7-T1 level has been determined to be the safest level to inject in the cervical spine as the epidural space is the largest. Despite pathology at different levels, studies have shown that the medication will spread up to the most cranial level, despite the level of injection.

## 2023-07-26 NOTE — PHYSICAL EXAM
[Rotation to left] : rotation to left [Rotation to right] : rotation to right [de-identified] : left lateral rotation 75 degrees [TWNoteComboBox6] : right lateral rotation 75 degrees [] : no palpable masses

## 2023-07-27 ENCOUNTER — RX RENEWAL (OUTPATIENT)
Age: 63
End: 2023-07-27

## 2023-07-31 ENCOUNTER — APPOINTMENT (OUTPATIENT)
Dept: PAIN MANAGEMENT | Facility: CLINIC | Age: 63
End: 2023-07-31
Payer: COMMERCIAL

## 2023-07-31 PROCEDURE — 62321 NJX INTERLAMINAR CRV/THRC: CPT

## 2023-07-31 NOTE — PROCEDURE
[FreeTextEntry3] : Date of Service: 07/31/2023   Account: [id number]  Patient: RASHARD GALLEGOS   YOB: 1960  Age: 62 year   Surgeon:                                                      Genoveva Monroe M.D.  Pre-Operative Diagnosis:                          Cervical Radiculopathy (M54.12)   Post Operative Diagnosis:                        Cervical Radiculopathy (M54.12)  Procedure:                                                  Interlaminar cervical epidural steroid injection (C7-T1) under fluoroscopic guidance  This procedure was carried out using fluoroscopic guidance.  The risks and benefits of the procedure were discussed extensively with the patient.  The consent of the patient was obtained and the following procedure was performed.  The patient was placed in the prone position using a thoracic and chin support.  The cervical area was prepped and draped in a sterile fashion.  The fluoroscope visualized the C7-T1 interspace using slight cephalad-caudad angulation and this area was marked.  Using sterile technique the superficial skin was anesthetized with 1% Lidocaine without epinephrine.  A 18 gauge Tuohoy needle was advanced under fluoroscopy using [id number] technique until ligament was engaged.  The stilette was then removed and a column of preservative free normal saline flushed throught the tuohoy needle and left with a concave fluid level above the butterfly portion of the tuohoy needle.  The needle was then advanced under fluoroscopic guidance until the column of saline disappeared.  Lateral view confirmed final needle tip placement in the epidural space.  After negative aspiration for heme and CSF, 1 cc of Omnipaque confirmed good cervical epiduragram.   Cervical epidurogram showed no evidence of intrathecal or intravascular flow, and good bilateral epidural flow from C3 to T2 levels.  An injectate of 6 cc of preservative free normal saline plus 12 mg of betamethasone was then injected into the epidural space. The needle was subsequently removed and pressure was applied.  Anesthesia personnel were present throughout the procedure.  The patient tolerated the procedure well and was instructed to contact me immediately if there were any problems.  Genoveva Monroe M.D.

## 2023-08-04 ENCOUNTER — LABORATORY RESULT (OUTPATIENT)
Age: 63
End: 2023-08-04

## 2023-08-04 ENCOUNTER — APPOINTMENT (OUTPATIENT)
Dept: ORTHOPEDIC SURGERY | Facility: CLINIC | Age: 63
End: 2023-08-04
Payer: COMMERCIAL

## 2023-08-04 VITALS — BODY MASS INDEX: 27.79 KG/M2 | HEIGHT: 62 IN | WEIGHT: 151 LBS

## 2023-08-04 PROCEDURE — 73562 X-RAY EXAM OF KNEE 3: CPT | Mod: LT

## 2023-08-04 PROCEDURE — 20610 DRAIN/INJ JOINT/BURSA W/O US: CPT | Mod: LT

## 2023-08-04 PROCEDURE — 99213 OFFICE O/P EST LOW 20 MIN: CPT | Mod: 25

## 2023-08-04 NOTE — PHYSICAL EXAM
[de-identified] : General appearance: well nourished and hydrated, pleasant, alert and oriented x 3, cooperative. HEENT: Normocephalic, EOM intact, Nasal septum midline, Oral cavity clear, External auditory canal clear. Cardiovascular: no apparent abnormalities, no lower leg edema, no varicosities, pedal pulses are palpable. Lymphatics Lymph nodes: none palpated, Lymphedema: not present. Neurologic: sensation is normal, no muscle weakness in upper or lower extremities, patella tendon reflexes intact . Dermatologic no apparent skin lesions, moist, warm, no rash. Spine:cervical spine appears normal and moves freely, thoracic spine appears normal and moves freely, lumbosacral spine appears normal and moves freely. Gait: nonantalgic.  Left Knee Inspection: soft tissue swelling  Wounds: healed midline incision Alignment: normal. Palpation: lateral hamstring and posterolateral corner tenderness on palpation. ROM: Active (in degrees): 0-125 Ligamentous laxity (neg): negative ant. drawer test, negative post. drawer test, stable to varus stress test, stable to valgus stress test, Patellofemoral Alignment Test: Q angle-, normal. Muscle Test: good quad strength. Leg examination: calf is soft and non-tender.  [de-identified] : Left knee x-ray, AP, lateral, merchant view taken at the office today demonstrates a revision total knee replacement in satisfactory position and alignment. No evidence of loosening. The patella sits in a central position.\par  \par  Right knee x-ray merchant view taken at the office today demonstrates good joint space and a well centered patella.

## 2023-08-04 NOTE — HISTORY OF PRESENT ILLNESS
[de-identified] : RASHARD GALLEGOS 62 year old female presents for follow-up evaluation s/p LEFT revision TKR in Jan 2023. Her pain got worse a couple months ago and Dr. Salvador informed her of gastric muscle strain, went to PT but got worse. She takes NSAIDs OTC prn like Advil. States symptoms are during activities, changing form sitting to standing. Pain posterolateral corner with clicking. She received epidural injection on Monday and states symptoms were better for a couple days with less swelling and less pain.

## 2023-08-04 NOTE — PROCEDURE
[de-identified] : Discussed at length the procedure of a knee aspiration. The risks, benefits, convalescence and alternatives were reviewed. The possible side effects discussed included but were not limited to: pain, swelling, bleeding and infection. Following this discussion, the knee was prepped with betadine and under a sterile condition, an 18 gauge needle was inserted into the joint through a lateral approach just superior to the patella with the knee in an extended position. The fluid was aspirated and sent for evaluation. Upon withdrawal of the needle the site was cleaned with alcohol and a bandaid applied. The patient tolerated the aspiration well and there were no adverse effects. Post aspiration instructions included no strenuous activity for 24 hours, cryotherapy and if there are any adverse effects to contact the office.  20 cc of blood-tinged synovial fluid were aspirated from the left knee.

## 2023-08-04 NOTE — ADDENDUM
[FreeTextEntry1] : This note was written by JARROD CABELLO on 08/04/2023 acting as scribe for Dr. Stephan Salvador M.D.  I, Dr. Stephan Salvador, have read and attest that all the information, medical decision making and discharge instructions within are true and accurate.

## 2023-08-07 ENCOUNTER — APPOINTMENT (OUTPATIENT)
Dept: PAIN MANAGEMENT | Facility: CLINIC | Age: 63
End: 2023-08-07
Payer: COMMERCIAL

## 2023-08-07 PROCEDURE — J3490M: CUSTOM

## 2023-08-07 PROCEDURE — 27096 INJECT SACROILIAC JOINT: CPT | Mod: LT

## 2023-08-07 NOTE — PROCEDURE
[FreeTextEntry3] : Date of Service: 08/07/2023   Account: [id number]  Patient: RASHARD GALLEGOS   YOB: 1960  Age: 62 year   Surgeon:  Genoveva Monroe M.D.  Pre-Operative Diagnosis:   Post Operative Diagnosis:   Procedure: Left Sacroiliac joint injection under fluoroscopic guidance                      Left Sacroiliac joint arthrogram    This procedure was carried out using fluoroscopic guidance.  The risks and benefits of the procedure were discussed extensively with the patient.  The consent of the patient was obtained and the following procedure was performed.  The patient was placed in the prone position.  The patient's back was prepped and draped in a sterile fashion.  The fluoroscopic image intensifier was then positioned so that anterior and posterior portion of the sacroiliac joint lined up in the same plane and appeared on the image as clear space.  This was achieved with slight cephalad and right medial angulation.  The area corresponding to the left inferior SIJ space was then identified and marked.   The skin and subcutaneous structures were then anesthetized using 1 cc of 1% lidocaine.  A 22 gauge spinal needle was then inserted and directed into the right sacroiliac joint space using fluoroscopic guidance.  After negative aspiration for heme and CSF, 2 cc of Omnipaque was injected and appeared to fill the joint space.  An injectate of 3cc 0.25% marcaine plus 80 mg of Kenalog was then injected into the left sacroiliac joint space.  The needles were then removed and pressure was applied.   The patient was instructed to apply ice over the injection site for twenty minutes every two hours for the next 24 to 48 hours.  The patient was also instructed to contact me immediately if there were any problems.   Genoveva Monroe M.D.

## 2023-08-09 ENCOUNTER — OUTPATIENT (OUTPATIENT)
Dept: OUTPATIENT SERVICES | Facility: HOSPITAL | Age: 63
LOS: 1 days | End: 2023-08-09
Payer: COMMERCIAL

## 2023-08-09 ENCOUNTER — APPOINTMENT (OUTPATIENT)
Dept: CT IMAGING | Facility: IMAGING CENTER | Age: 63
End: 2023-08-09
Payer: COMMERCIAL

## 2023-08-09 DIAGNOSIS — Z96.652 PRESENCE OF LEFT ARTIFICIAL KNEE JOINT: ICD-10-CM

## 2023-08-09 DIAGNOSIS — Z98.890 OTHER SPECIFIED POSTPROCEDURAL STATES: Chronic | ICD-10-CM

## 2023-08-09 DIAGNOSIS — Z98.1 ARTHRODESIS STATUS: Chronic | ICD-10-CM

## 2023-08-09 DIAGNOSIS — Z96.659 PRESENCE OF UNSPECIFIED ARTIFICIAL KNEE JOINT: Chronic | ICD-10-CM

## 2023-08-09 PROCEDURE — 73700 CT LOWER EXTREMITY W/O DYE: CPT

## 2023-08-09 PROCEDURE — 73700 CT LOWER EXTREMITY W/O DYE: CPT | Mod: 26,LT

## 2023-08-10 NOTE — H&P PST ADULT - OTHER CARE PROVIDERS
Consent for treatment  needed.   neurologist- for chronic back pain- Dr. Schoenberg [phone] Dr Carmona [facility]

## 2023-08-14 ENCOUNTER — OUTPATIENT (OUTPATIENT)
Dept: OUTPATIENT SERVICES | Facility: HOSPITAL | Age: 63
LOS: 1 days | End: 2023-08-14
Payer: COMMERCIAL

## 2023-08-14 ENCOUNTER — APPOINTMENT (OUTPATIENT)
Dept: MAMMOGRAPHY | Facility: IMAGING CENTER | Age: 63
End: 2023-08-14
Payer: COMMERCIAL

## 2023-08-14 ENCOUNTER — APPOINTMENT (OUTPATIENT)
Dept: ULTRASOUND IMAGING | Facility: IMAGING CENTER | Age: 63
End: 2023-08-14
Payer: COMMERCIAL

## 2023-08-14 ENCOUNTER — APPOINTMENT (OUTPATIENT)
Dept: RADIOLOGY | Facility: IMAGING CENTER | Age: 63
End: 2023-08-14
Payer: COMMERCIAL

## 2023-08-14 DIAGNOSIS — Z98.890 OTHER SPECIFIED POSTPROCEDURAL STATES: Chronic | ICD-10-CM

## 2023-08-14 DIAGNOSIS — Z96.659 PRESENCE OF UNSPECIFIED ARTIFICIAL KNEE JOINT: Chronic | ICD-10-CM

## 2023-08-14 DIAGNOSIS — Z00.8 ENCOUNTER FOR OTHER GENERAL EXAMINATION: ICD-10-CM

## 2023-08-14 LAB
B PERT IGG+IGM PNL SER: ABNORMAL
COLOR FLD: NORMAL
EOSINOPHIL # FLD MANUAL: 0 %
FLUID INTAKE SUBSTANCE CLASS: NORMAL
LYMPHOCYTES # FLD MANUAL: 70 %
MESOTHL CELL NFR FLD: 0 %
MONOS+MACROS NFR FLD MANUAL: 12 %
NEUTS SEG # FLD MANUAL: 18 %
NRBC # FLD: 0 %
RBC # FLD MANUAL: ABNORMAL /UL
SYCRY CLARITY: ABNORMAL
SYCRY COLOR: ABNORMAL
SYCRY ID: NORMAL
SYCRY TUBE: NORMAL
TOTAL CELLS COUNTED FLD: 257 /UL
TUBE TYPE: NORMAL
UNIDENT CELLS NFR FLD MANUAL: 0 %
VARIANT LYMPHS # FLD MANUAL: 0 %

## 2023-08-14 PROCEDURE — 77067 SCR MAMMO BI INCL CAD: CPT | Mod: 26

## 2023-08-14 PROCEDURE — 77080 DXA BONE DENSITY AXIAL: CPT | Mod: 26

## 2023-08-14 PROCEDURE — 76641 ULTRASOUND BREAST COMPLETE: CPT | Mod: 26,50

## 2023-08-14 PROCEDURE — 77063 BREAST TOMOSYNTHESIS BI: CPT | Mod: 26

## 2023-08-14 PROCEDURE — 76641 ULTRASOUND BREAST COMPLETE: CPT

## 2023-08-14 PROCEDURE — 77080 DXA BONE DENSITY AXIAL: CPT

## 2023-08-14 PROCEDURE — 77067 SCR MAMMO BI INCL CAD: CPT

## 2023-08-14 PROCEDURE — 77063 BREAST TOMOSYNTHESIS BI: CPT

## 2023-08-15 ENCOUNTER — TRANSCRIPTION ENCOUNTER (OUTPATIENT)
Age: 63
End: 2023-08-15

## 2023-08-16 ENCOUNTER — APPOINTMENT (OUTPATIENT)
Dept: ORTHOPEDIC SURGERY | Facility: CLINIC | Age: 63
End: 2023-08-16
Payer: COMMERCIAL

## 2023-08-16 PROCEDURE — G2012 BRIEF CHECK IN BY MD/QHP: CPT

## 2023-08-18 ENCOUNTER — APPOINTMENT (OUTPATIENT)
Dept: OTOLARYNGOLOGY | Facility: CLINIC | Age: 63
End: 2023-08-18
Payer: COMMERCIAL

## 2023-08-18 VITALS
HEART RATE: 103 BPM | BODY MASS INDEX: 27.6 KG/M2 | HEIGHT: 62 IN | DIASTOLIC BLOOD PRESSURE: 75 MMHG | WEIGHT: 150 LBS | OXYGEN SATURATION: 95 % | SYSTOLIC BLOOD PRESSURE: 112 MMHG

## 2023-08-18 DIAGNOSIS — H93.13 TINNITUS, BILATERAL: ICD-10-CM

## 2023-08-18 DIAGNOSIS — R09.81 NASAL CONGESTION: ICD-10-CM

## 2023-08-18 DIAGNOSIS — H90.3 SENSORINEURAL HEARING LOSS, BILATERAL: ICD-10-CM

## 2023-08-18 PROCEDURE — 31231 NASAL ENDOSCOPY DX: CPT

## 2023-08-18 PROCEDURE — 99214 OFFICE O/P EST MOD 30 MIN: CPT | Mod: 25

## 2023-08-18 RX ORDER — FLUTICASONE PROPIONATE 50 UG/1
50 SPRAY, METERED NASAL
Qty: 16 | Refills: 3 | Status: ACTIVE | COMMUNITY
Start: 2022-09-29 | End: 1900-01-01

## 2023-08-25 ENCOUNTER — APPOINTMENT (OUTPATIENT)
Dept: ORTHOPEDIC SURGERY | Facility: CLINIC | Age: 63
End: 2023-08-25
Payer: COMMERCIAL

## 2023-08-25 DIAGNOSIS — Z96.652 PRESENCE OF LEFT ARTIFICIAL KNEE JOINT: ICD-10-CM

## 2023-08-25 PROBLEM — R09.81 NASAL CONGESTION: Status: ACTIVE | Noted: 2022-10-03

## 2023-08-25 PROBLEM — H93.13 BILATERAL TINNITUS: Status: ACTIVE | Noted: 2022-10-03

## 2023-08-25 PROBLEM — H90.3 BILATERAL SENSORINEURAL HEARING LOSS: Status: ACTIVE | Noted: 2022-09-29

## 2023-08-25 PROCEDURE — 99213 OFFICE O/P EST LOW 20 MIN: CPT

## 2023-08-25 NOTE — ASSESSMENT
[FreeTextEntry1] : 61Y F follow up for nasal congestion 2/2 left NSD / PND and bilateral tinnitus 2/2 SNHL.   9/29/22 Audiogram shows AU Normal to Moderate SNHL 250-8k hz. AU Tymp A. Tinnitus Match 8K @ 30db  Nasal endoscopy shows Left DNS, Moderate Inferior Turbinates Hypertrophy, No polyps, purulence or masses, Posterior Nasal pharynx Cobblestone/Clear Drainage, Moderate mucus production coating nasal cavity. No signs of acute infection  Recommend: Nasal Congestion/PND -Improved -Discussed Post-nasal drip occurs when mucus from the nasal passages and sinuses drains into the throat. If the mucus becomes thick, post-nasal drip can cause problems such as a sore throat, laryngitis, a cough, bad breath, hoarseness, and sometimes wheezing. -Discussed the importance of rinsing the sinus before using nasal spray so that excess mucus lining the nasal cavity can be wash away so medication can penetration the nose. -Continue Sinus Rinse + Flonase nasal spray -If Flonase spray is not effective can discuss additional other nasal sprays for treatment  Tinnitus 2/2 SNHL -Improved -Discussed that Hearing Aids may help with relieving some of the tinnitus. Tinnitus usually follows the same pattern of hearing loss and can be attributed to phantom sounds in the brain filling in for the loss of hearing in those particular frequencies -Discussed that Tinnitus usually can be attributed to phantom sounds in the brain filling in for sounds. If the tinnitus is brief only last for a few seconds with no loss of hearing associated. It is usually physiological and can be management conservatively  -Discussed notched therapy, masking therapy, cognitive behavioral therapy, factors that may influence tinnitus, and discussed limited benefit of tinnitus supplements. -Patient states will try white noise machine -Patient got hearing aids and has been using it intermittently with some succuss  -Return to clinic as needed or sooner if new/worsen symptoms present

## 2023-08-25 NOTE — REASON FOR VISIT
[Subsequent Evaluation] : a subsequent evaluation for [FreeTextEntry2] : PND/Nasal congestion and bilateral tinnitus 2/2 SNHL.

## 2023-08-25 NOTE — DISCUSSION/SUMMARY
[de-identified] : Patient is s/p L revision TKR. I have reassured them that their implants are functioning well. She continues to have pain on the posterolateral corner, which may be an iliotibial band or popliteal tendonitis. There is no sign of instability. I reviewed the CT scan and lab tests with them. There is no sign of infection. I explained to her that it may take at least a year for it to resolve. In the interim, I suggested a home exercise program, stretching, massage, and Motrin prn.  I will see them back in January with x-rays.

## 2023-08-25 NOTE — HISTORY OF PRESENT ILLNESS
[de-identified] : 61Y F follow up for PND/Nasal congestion and bilateral tinnitus 2/2 SNHL. Patient states notice right side nasal congestion started 8/11/23 States might have had a temperature 99.4 Today slight right nasal drainage Want to come in to see if she had sinus infection.

## 2023-08-25 NOTE — PHYSICAL EXAM
[de-identified] : General appearance: well nourished and hydrated, pleasant, alert and oriented x 3, cooperative. HEENT: Normocephalic, EOM intact, Nasal septum midline, Oral cavity clear, External auditory canal clear. Cardiovascular: no apparent abnormalities, no lower leg edema, no varicosities, pedal pulses are palpable. Lymphatics Lymph nodes: none palpated, Lymphedema: not present. Neurologic: sensation is normal, no muscle weakness in upper or lower extremities, patella tendon reflexes intact . Dermatologic no apparent skin lesions, moist, warm, no rash. Spine:cervical spine appears normal and moves freely, thoracic spine appears normal and moves freely, lumbosacral spine appears normal and moves freely. Gait: nonantalgic.  Left Knee Inspection: soft tissue swelling  Wounds: healed midline incision Alignment: normal. Palpation: Iliotibial band, fibular head, biceps femoris, and posterolateral corner tenderness on palpation. ROM: Active (in degrees): 0-125 Ligamentous laxity (neg): negative ant. drawer test, negative post. drawer test, stable to varus stress test, stable to valgus stress test, Patellofemoral Alignment Test: Q angle-, normal. Muscle Test: good quad strength. Leg examination: calf is soft and non-tender.  [de-identified] : No x-rays were taken today.

## 2023-08-25 NOTE — ADDENDUM
[FreeTextEntry1] : This note was written by JARROD CABELLO on 08/25/2023 acting as scribe for Dr. Stephan ORR I, Dr. Stephan Salvador, have read and attest that all the information, medical decision making and discharge instructions within are true and accurate.   This note was written by Keith Gonzalez on 08/25/2023 acting as scribe for Dr. Stephan ORR I, Dr. Stephan Salvador, have read and attest that all the information, medical decision making and discharge instructions within are true and accurate.

## 2023-08-25 NOTE — HISTORY OF PRESENT ILLNESS
[de-identified] : RASHARD GALLEGOS 62-year-old female presents for follow-up evaluation s/p LEFT revision TKR at 8 months. At her last visit, she was told that her symptoms are muscular in nature or related to a pull of her lateral hamstring. Her knee was aspirated on August 4, and she was sent for a CT scan. She is here for a follow-up evaluation. She has swelling and pain on the posterior aspect of her left TKR. She takes Motrin prn. She also uses ice.

## 2023-09-18 ENCOUNTER — APPOINTMENT (OUTPATIENT)
Dept: PAIN MANAGEMENT | Facility: CLINIC | Age: 63
End: 2023-09-18

## 2023-09-20 ENCOUNTER — APPOINTMENT (OUTPATIENT)
Dept: ULTRASOUND IMAGING | Facility: IMAGING CENTER | Age: 63
End: 2023-09-20
Payer: COMMERCIAL

## 2023-09-20 ENCOUNTER — OUTPATIENT (OUTPATIENT)
Dept: OUTPATIENT SERVICES | Facility: HOSPITAL | Age: 63
LOS: 1 days | End: 2023-09-20
Payer: COMMERCIAL

## 2023-09-20 ENCOUNTER — APPOINTMENT (OUTPATIENT)
Dept: OTOLARYNGOLOGY | Facility: CLINIC | Age: 63
End: 2023-09-20

## 2023-09-20 DIAGNOSIS — Z96.659 PRESENCE OF UNSPECIFIED ARTIFICIAL KNEE JOINT: Chronic | ICD-10-CM

## 2023-09-20 DIAGNOSIS — Z98.890 OTHER SPECIFIED POSTPROCEDURAL STATES: Chronic | ICD-10-CM

## 2023-09-20 DIAGNOSIS — Z98.1 ARTHRODESIS STATUS: Chronic | ICD-10-CM

## 2023-09-20 DIAGNOSIS — R31.29 OTHER MICROSCOPIC HEMATURIA: ICD-10-CM

## 2023-09-20 PROCEDURE — 76770 US EXAM ABDO BACK WALL COMP: CPT

## 2023-09-20 PROCEDURE — 76770 US EXAM ABDO BACK WALL COMP: CPT | Mod: 26

## 2023-09-25 ENCOUNTER — RX RENEWAL (OUTPATIENT)
Age: 63
End: 2023-09-25

## 2023-09-27 ENCOUNTER — APPOINTMENT (OUTPATIENT)
Dept: PAIN MANAGEMENT | Facility: CLINIC | Age: 63
End: 2023-09-27

## 2023-10-05 ENCOUNTER — APPOINTMENT (OUTPATIENT)
Dept: PAIN MANAGEMENT | Facility: CLINIC | Age: 63
End: 2023-10-05
Payer: MEDICARE

## 2023-10-05 VITALS — HEIGHT: 62 IN | WEIGHT: 151 LBS | BODY MASS INDEX: 27.79 KG/M2

## 2023-10-05 PROCEDURE — 99214 OFFICE O/P EST MOD 30 MIN: CPT

## 2023-10-13 ENCOUNTER — APPOINTMENT (OUTPATIENT)
Dept: ORTHOPEDIC SURGERY | Facility: CLINIC | Age: 63
End: 2023-10-13
Payer: MEDICARE

## 2023-10-13 DIAGNOSIS — M54.12 RADICULOPATHY, CERVICAL REGION: ICD-10-CM

## 2023-10-13 PROCEDURE — 99214 OFFICE O/P EST MOD 30 MIN: CPT

## 2023-10-13 PROCEDURE — 72040 X-RAY EXAM NECK SPINE 2-3 VW: CPT

## 2023-10-17 PROBLEM — M54.12 CERVICAL RADICULITIS: Status: ACTIVE | Noted: 2023-07-26

## 2023-10-20 ENCOUNTER — APPOINTMENT (OUTPATIENT)
Age: 63
End: 2023-10-20

## 2023-10-25 ENCOUNTER — APPOINTMENT (OUTPATIENT)
Dept: PAIN MANAGEMENT | Facility: CLINIC | Age: 63
End: 2023-10-25

## 2023-10-26 ENCOUNTER — APPOINTMENT (OUTPATIENT)
Dept: PULMONOLOGY | Facility: CLINIC | Age: 63
End: 2023-10-26
Payer: MEDICARE

## 2023-10-26 ENCOUNTER — OUTPATIENT (OUTPATIENT)
Dept: OUTPATIENT SERVICES | Facility: HOSPITAL | Age: 63
LOS: 1 days | Discharge: HOME | End: 2023-10-26

## 2023-10-26 VITALS
WEIGHT: 151 LBS | BODY MASS INDEX: 27.79 KG/M2 | DIASTOLIC BLOOD PRESSURE: 68 MMHG | HEIGHT: 62 IN | HEART RATE: 101 BPM | OXYGEN SATURATION: 96 % | RESPIRATION RATE: 16 BRPM | SYSTOLIC BLOOD PRESSURE: 122 MMHG | TEMPERATURE: 97.3 F

## 2023-10-26 VITALS
WEIGHT: 156.09 LBS | TEMPERATURE: 98 F | HEART RATE: 94 BPM | RESPIRATION RATE: 18 BRPM | HEIGHT: 62 IN | DIASTOLIC BLOOD PRESSURE: 85 MMHG | OXYGEN SATURATION: 98 % | SYSTOLIC BLOOD PRESSURE: 129 MMHG

## 2023-10-26 DIAGNOSIS — Z96.659 PRESENCE OF UNSPECIFIED ARTIFICIAL KNEE JOINT: Chronic | ICD-10-CM

## 2023-10-26 DIAGNOSIS — G47.30 SLEEP APNEA, UNSPECIFIED: ICD-10-CM

## 2023-10-26 DIAGNOSIS — Z01.818 ENCOUNTER FOR OTHER PREPROCEDURAL EXAMINATION: ICD-10-CM

## 2023-10-26 DIAGNOSIS — Z98.890 OTHER SPECIFIED POSTPROCEDURAL STATES: Chronic | ICD-10-CM

## 2023-10-26 DIAGNOSIS — I10 ESSENTIAL (PRIMARY) HYPERTENSION: ICD-10-CM

## 2023-10-26 DIAGNOSIS — M54.12 RADICULOPATHY, CERVICAL REGION: ICD-10-CM

## 2023-10-26 DIAGNOSIS — Z01.811 ENCOUNTER FOR PREPROCEDURAL RESPIRATORY EXAMINATION: ICD-10-CM

## 2023-10-26 DIAGNOSIS — Z98.1 ARTHRODESIS STATUS: Chronic | ICD-10-CM

## 2023-10-26 LAB
A1C WITH ESTIMATED AVERAGE GLUCOSE RESULT: 5.5 % — SIGNIFICANT CHANGE UP (ref 4–5.6)
A1C WITH ESTIMATED AVERAGE GLUCOSE RESULT: 5.5 % — SIGNIFICANT CHANGE UP (ref 4–5.6)
ALBUMIN SERPL ELPH-MCNC: 3.9 G/DL — SIGNIFICANT CHANGE UP (ref 3.3–5)
ALBUMIN SERPL ELPH-MCNC: 3.9 G/DL — SIGNIFICANT CHANGE UP (ref 3.3–5)
ALP SERPL-CCNC: 83 U/L — SIGNIFICANT CHANGE UP (ref 40–120)
ALP SERPL-CCNC: 83 U/L — SIGNIFICANT CHANGE UP (ref 40–120)
ALT FLD-CCNC: 31 U/L — SIGNIFICANT CHANGE UP (ref 12–78)
ALT FLD-CCNC: 31 U/L — SIGNIFICANT CHANGE UP (ref 12–78)
ANION GAP SERPL CALC-SCNC: 6 MMOL/L — SIGNIFICANT CHANGE UP (ref 5–17)
ANION GAP SERPL CALC-SCNC: 6 MMOL/L — SIGNIFICANT CHANGE UP (ref 5–17)
APTT BLD: 29.4 SEC — SIGNIFICANT CHANGE UP (ref 24.5–35.6)
APTT BLD: 29.4 SEC — SIGNIFICANT CHANGE UP (ref 24.5–35.6)
AST SERPL-CCNC: 24 U/L — SIGNIFICANT CHANGE UP (ref 15–37)
AST SERPL-CCNC: 24 U/L — SIGNIFICANT CHANGE UP (ref 15–37)
BASOPHILS # BLD AUTO: 0.03 K/UL — SIGNIFICANT CHANGE UP (ref 0–0.2)
BASOPHILS # BLD AUTO: 0.03 K/UL — SIGNIFICANT CHANGE UP (ref 0–0.2)
BASOPHILS NFR BLD AUTO: 0.5 % — SIGNIFICANT CHANGE UP (ref 0–2)
BASOPHILS NFR BLD AUTO: 0.5 % — SIGNIFICANT CHANGE UP (ref 0–2)
BILIRUB SERPL-MCNC: 1.4 MG/DL — HIGH (ref 0.2–1.2)
BILIRUB SERPL-MCNC: 1.4 MG/DL — HIGH (ref 0.2–1.2)
BUN SERPL-MCNC: 20 MG/DL — SIGNIFICANT CHANGE UP (ref 7–23)
BUN SERPL-MCNC: 20 MG/DL — SIGNIFICANT CHANGE UP (ref 7–23)
CALCIUM SERPL-MCNC: 9.2 MG/DL — SIGNIFICANT CHANGE UP (ref 8.5–10.1)
CALCIUM SERPL-MCNC: 9.2 MG/DL — SIGNIFICANT CHANGE UP (ref 8.5–10.1)
CHLORIDE SERPL-SCNC: 106 MMOL/L — SIGNIFICANT CHANGE UP (ref 96–108)
CHLORIDE SERPL-SCNC: 106 MMOL/L — SIGNIFICANT CHANGE UP (ref 96–108)
CO2 SERPL-SCNC: 28 MMOL/L — SIGNIFICANT CHANGE UP (ref 22–31)
CO2 SERPL-SCNC: 28 MMOL/L — SIGNIFICANT CHANGE UP (ref 22–31)
CREAT SERPL-MCNC: 0.81 MG/DL — SIGNIFICANT CHANGE UP (ref 0.5–1.3)
CREAT SERPL-MCNC: 0.81 MG/DL — SIGNIFICANT CHANGE UP (ref 0.5–1.3)
EGFR: 81 ML/MIN/1.73M2 — SIGNIFICANT CHANGE UP
EGFR: 81 ML/MIN/1.73M2 — SIGNIFICANT CHANGE UP
EOSINOPHIL # BLD AUTO: 0.02 K/UL — SIGNIFICANT CHANGE UP (ref 0–0.5)
EOSINOPHIL # BLD AUTO: 0.02 K/UL — SIGNIFICANT CHANGE UP (ref 0–0.5)
EOSINOPHIL NFR BLD AUTO: 0.3 % — SIGNIFICANT CHANGE UP (ref 0–6)
EOSINOPHIL NFR BLD AUTO: 0.3 % — SIGNIFICANT CHANGE UP (ref 0–6)
ESTIMATED AVERAGE GLUCOSE: 111 MG/DL — SIGNIFICANT CHANGE UP (ref 68–114)
ESTIMATED AVERAGE GLUCOSE: 111 MG/DL — SIGNIFICANT CHANGE UP (ref 68–114)
GLUCOSE SERPL-MCNC: 100 MG/DL — HIGH (ref 70–99)
GLUCOSE SERPL-MCNC: 100 MG/DL — HIGH (ref 70–99)
HCT VFR BLD CALC: 41.1 % — SIGNIFICANT CHANGE UP (ref 34.5–45)
HCT VFR BLD CALC: 41.1 % — SIGNIFICANT CHANGE UP (ref 34.5–45)
HGB BLD-MCNC: 13.8 G/DL — SIGNIFICANT CHANGE UP (ref 11.5–15.5)
HGB BLD-MCNC: 13.8 G/DL — SIGNIFICANT CHANGE UP (ref 11.5–15.5)
IMM GRANULOCYTES NFR BLD AUTO: 0.3 % — SIGNIFICANT CHANGE UP (ref 0–0.9)
IMM GRANULOCYTES NFR BLD AUTO: 0.3 % — SIGNIFICANT CHANGE UP (ref 0–0.9)
INR BLD: 0.87 RATIO — SIGNIFICANT CHANGE UP (ref 0.85–1.18)
INR BLD: 0.87 RATIO — SIGNIFICANT CHANGE UP (ref 0.85–1.18)
LYMPHOCYTES # BLD AUTO: 1.14 K/UL — SIGNIFICANT CHANGE UP (ref 1–3.3)
LYMPHOCYTES # BLD AUTO: 1.14 K/UL — SIGNIFICANT CHANGE UP (ref 1–3.3)
LYMPHOCYTES # BLD AUTO: 19.5 % — SIGNIFICANT CHANGE UP (ref 13–44)
LYMPHOCYTES # BLD AUTO: 19.5 % — SIGNIFICANT CHANGE UP (ref 13–44)
MCHC RBC-ENTMCNC: 30.5 PG — SIGNIFICANT CHANGE UP (ref 27–34)
MCHC RBC-ENTMCNC: 30.5 PG — SIGNIFICANT CHANGE UP (ref 27–34)
MCHC RBC-ENTMCNC: 33.6 G/DL — SIGNIFICANT CHANGE UP (ref 32–36)
MCHC RBC-ENTMCNC: 33.6 G/DL — SIGNIFICANT CHANGE UP (ref 32–36)
MCV RBC AUTO: 90.9 FL — SIGNIFICANT CHANGE UP (ref 80–100)
MCV RBC AUTO: 90.9 FL — SIGNIFICANT CHANGE UP (ref 80–100)
MONOCYTES # BLD AUTO: 0.41 K/UL — SIGNIFICANT CHANGE UP (ref 0–0.9)
MONOCYTES # BLD AUTO: 0.41 K/UL — SIGNIFICANT CHANGE UP (ref 0–0.9)
MONOCYTES NFR BLD AUTO: 7 % — SIGNIFICANT CHANGE UP (ref 2–14)
MONOCYTES NFR BLD AUTO: 7 % — SIGNIFICANT CHANGE UP (ref 2–14)
NEUTROPHILS # BLD AUTO: 4.24 K/UL — SIGNIFICANT CHANGE UP (ref 1.8–7.4)
NEUTROPHILS # BLD AUTO: 4.24 K/UL — SIGNIFICANT CHANGE UP (ref 1.8–7.4)
NEUTROPHILS NFR BLD AUTO: 72.4 % — SIGNIFICANT CHANGE UP (ref 43–77)
NEUTROPHILS NFR BLD AUTO: 72.4 % — SIGNIFICANT CHANGE UP (ref 43–77)
NRBC # BLD: 0 /100 WBCS — SIGNIFICANT CHANGE UP (ref 0–0)
NRBC # BLD: 0 /100 WBCS — SIGNIFICANT CHANGE UP (ref 0–0)
PLATELET # BLD AUTO: 358 K/UL — SIGNIFICANT CHANGE UP (ref 150–400)
PLATELET # BLD AUTO: 358 K/UL — SIGNIFICANT CHANGE UP (ref 150–400)
POTASSIUM SERPL-MCNC: 3.5 MMOL/L — SIGNIFICANT CHANGE UP (ref 3.5–5.3)
POTASSIUM SERPL-MCNC: 3.5 MMOL/L — SIGNIFICANT CHANGE UP (ref 3.5–5.3)
POTASSIUM SERPL-SCNC: 3.5 MMOL/L — SIGNIFICANT CHANGE UP (ref 3.5–5.3)
POTASSIUM SERPL-SCNC: 3.5 MMOL/L — SIGNIFICANT CHANGE UP (ref 3.5–5.3)
PROT SERPL-MCNC: 7.8 GM/DL — SIGNIFICANT CHANGE UP (ref 6–8.3)
PROT SERPL-MCNC: 7.8 GM/DL — SIGNIFICANT CHANGE UP (ref 6–8.3)
PROTHROM AB SERPL-ACNC: 10.4 SEC — SIGNIFICANT CHANGE UP (ref 9.5–13)
PROTHROM AB SERPL-ACNC: 10.4 SEC — SIGNIFICANT CHANGE UP (ref 9.5–13)
RBC # BLD: 4.52 M/UL — SIGNIFICANT CHANGE UP (ref 3.8–5.2)
RBC # BLD: 4.52 M/UL — SIGNIFICANT CHANGE UP (ref 3.8–5.2)
RBC # FLD: 13.2 % — SIGNIFICANT CHANGE UP (ref 10.3–14.5)
RBC # FLD: 13.2 % — SIGNIFICANT CHANGE UP (ref 10.3–14.5)
SODIUM SERPL-SCNC: 140 MMOL/L — SIGNIFICANT CHANGE UP (ref 135–145)
SODIUM SERPL-SCNC: 140 MMOL/L — SIGNIFICANT CHANGE UP (ref 135–145)
WBC # BLD: 5.86 K/UL — SIGNIFICANT CHANGE UP (ref 3.8–10.5)
WBC # BLD: 5.86 K/UL — SIGNIFICANT CHANGE UP (ref 3.8–10.5)
WBC # FLD AUTO: 5.86 K/UL — SIGNIFICANT CHANGE UP (ref 3.8–10.5)
WBC # FLD AUTO: 5.86 K/UL — SIGNIFICANT CHANGE UP (ref 3.8–10.5)

## 2023-10-26 PROCEDURE — 94729 DIFFUSING CAPACITY: CPT

## 2023-10-26 PROCEDURE — 94010 BREATHING CAPACITY TEST: CPT

## 2023-10-26 PROCEDURE — 99214 OFFICE O/P EST MOD 30 MIN: CPT | Mod: 25

## 2023-10-26 PROCEDURE — 94727 GAS DIL/WSHOT DETER LNG VOL: CPT

## 2023-10-26 PROCEDURE — ZZZZZ: CPT

## 2023-10-26 RX ORDER — BUDESONIDE AND FORMOTEROL FUMARATE DIHYDRATE 160; 4.5 UG/1; UG/1
160-4.5 AEROSOL RESPIRATORY (INHALATION) TWICE DAILY
Qty: 3 | Refills: 1 | Status: DISCONTINUED | COMMUNITY
Start: 2017-12-13 | End: 2023-10-26

## 2023-10-26 RX ORDER — ALBUTEROL SULFATE 90 UG/1
108 (90 BASE) AEROSOL, METERED RESPIRATORY (INHALATION)
Qty: 1 | Refills: 5 | Status: DISCONTINUED | COMMUNITY
Start: 2018-05-11 | End: 2023-10-26

## 2023-10-26 RX ORDER — ALBUTEROL SULFATE 90 UG/1
108 (90 BASE) AEROSOL, METERED RESPIRATORY (INHALATION)
Qty: 3 | Refills: 5 | Status: DISCONTINUED | COMMUNITY
Start: 2018-03-14 | End: 2023-10-26

## 2023-10-26 RX ORDER — FAMOTIDINE 40 MG/1
40 TABLET, FILM COATED ORAL
Qty: 180 | Refills: 1 | Status: DISCONTINUED | COMMUNITY
Start: 2022-04-12 | End: 2023-10-26

## 2023-10-26 RX ORDER — ALBUTEROL SULFATE 108 UG/1
108 (90 BASE) AEROSOL, METERED RESPIRATORY (INHALATION)
Qty: 3 | Refills: 1 | Status: DISCONTINUED | COMMUNITY
Start: 2017-08-24 | End: 2023-10-26

## 2023-10-26 RX ORDER — AZELASTINE HYDROCHLORIDE 137 UG/1
0.1 SPRAY, METERED NASAL TWICE DAILY
Qty: 1 | Refills: 1 | Status: ACTIVE | COMMUNITY
Start: 2023-10-26 | End: 1900-01-01

## 2023-10-26 RX ORDER — ZAFIRLUKAST 20 MG/1
20 TABLET, COATED ORAL
Qty: 180 | Refills: 1 | Status: ACTIVE | COMMUNITY
Start: 2017-08-24 | End: 1900-01-01

## 2023-10-26 NOTE — H&P PST ADULT - PROBLEM SELECTOR PLAN 4
Assessment and Plan: labs - cbc,pt/ptt,bmp,t&s,nose cx,ekg  M/C required, cardio , pulmonary   preop 3 day hibiclens instruction reviewed and given .instructed on if  nose cx positive use mupuricin 5 days and checklist given  take routine meds DOS with sips of water. avoid NSAID and OTC supplements. verbalized understanding  information on proper nutrition , increase protein and better food choices provided in packet  ensure clear

## 2023-10-26 NOTE — H&P PST ADULT - RESPIRATORY AND THORAX
PAST MEDICAL HISTORY:  Abdominal wall hematoma     CAD (coronary artery disease) s/p stent to LAD Jan 2022    HLD (hyperlipidemia)     HTN (hypertension)     Hypothyroidism     Insulin dependent type 2 diabetes mellitus     PAD (peripheral artery disease)     S/P angiogram of extremity     Urinary retention     
details…

## 2023-10-26 NOTE — H&P PST ADULT - NSICDXPASTSURGICALHX_GEN_ALL_CORE_FT
PAST SURGICAL HISTORY:  Ectopic Pregnancy 2001     S/P hammer toe correction 2015,2016,2017    S/P knee replacement left 12/13/21    S/P laminectomy with spinal fusion L4-L5    S/P Lumpectomy of Breast left  benign 1985     S/P rotator cuff repair left 2013

## 2023-10-26 NOTE — H&P PST ADULT - HISTORY OF PRESENT ILLNESS
64 y/o F PMH Allergic Rhinitis, Asthma, HTN, HLD, GERD, CRYSTAL- on oral device/ not on CPAP, presents to PST for scheduled  cervical disk replacement  This patient denies any fever, cough, sob, flu like symptoms or travel outside of the [place] in the past 30 days

## 2023-10-26 NOTE — H&P PST ADULT - NSICDXPASTMEDICALHX_GEN_ALL_CORE_FT
PAST MEDICAL HISTORY:  2019 novel coronavirus disease (COVID-19) 12/2021; August 2022    Asthma exercise induced and stress induced asthma - pt reports last exacerbation on 02/2021    H/O scoliosis     H/O spinal stenosis     Hemorrhoid     Herniated cervical disc all levels    HTN (hypertension)     IBS (irritable bowel syndrome)     IBS (irritable colon syndrome)     Obstructive Sleep Apnea on oral mouth device- follows up with pulmonologist    Osteoarthritis     Rotator Cuff Disorder left

## 2023-10-26 NOTE — H&P PST ADULT - ASSESSMENT
cervical radiculopathy  CAPRINI SCORE    AGE RELATED RISK FACTORS                                                       MOBILITY RELATED FACTORS  [ ] Age 41-60 years                                            (1 Point)                  [ ] Bed rest                                                        (1 Point)  x[ ] Age: 61-74 years                                           (2 Points)                [ ] Plaster cast                                                   (2 Points)  [ ] Age= 75 years                                              (3 Points)                 [ ] Bed bound for more than 72 hours                   (2 Points)    DISEASE RELATED RISK FACTORS                                               GENDER SPECIFIC FACTORS  [ ] Edema in the lower extremities                       (1 Point)                  [ ] Pregnancy                                                     (1 Point)  [ ] Varicose veins                                               (1 Point)                  [ ] Post-partum < 6 weeks                                   (1 Point)             [ x] BMI > 25 Kg/m2                                            (1 Point)                  [ ] Hormonal therapy  or oral contraception            (1 Point)                 [ ] Sepsis (in the previous month)                        (1 Point)                  [ ] History of pregnancy complications  [ ] Pneumonia or serious lung disease                                               [ ] Unexplained or recurrent                       (1 Point)           (in the previous month)                               (1 Point)  [ ] Abnormal pulmonary function test                     (1 Point)                 SURGERY RELATED RISK FACTORS  [ ] Acute myocardial infarction                              (1 Point)                 [ ]  Section                                            (1 Point)  [ ] Congestive heart failure (in the previous month)  (1 Point)                 [ ] Minor surgery                                                 (1 Point)   [ ] Inflammatory bowel disease                             (1 Point)                 x ] Arthroscopic surgery                                        (2 Points)  [ ] Central venous access                                    (2 Points)                [ ] General surgery lasting more than 45 minutes   (2 Points)       [ ] Stroke (in the previous month)                          (5 Points)               [ ] Elective arthroplasty                                        (5 Points)                                                                                                                                               HEMATOLOGY RELATED FACTORS                                                 TRAUMA RELATED RISK FACTORS  [ ] Prior episodes of VTE                                     (3 Points)                 [ ] Fracture of the hip, pelvis, or leg                       (5 Points)  [ ] Positive family history for VTE                         (3 Points)                 [ ] Acute spinal cord injury (in the previous month)  (5 Points)  [ ] Prothrombin 28347 A                                      (3 Points)                 [ ] Paralysis  (less than 1 month)                          (5 Points)  [ ] Factor V Leiden                                             (3 Points)                 [ ] Multiple Trauma within 1 month                         (5 Points)  [ ] Lupus anticoagulants                                     (3 Points)                                                           [ ] Anticardiolipin antibodies                                (3 Points)                                                       [ ] High homocysteine in the blood                      (3 Points)                                             [ ] Other congenital or acquired thrombophilia       (3 Points)                                                [ ] Heparin induced thrombocytopenia                  (3 Points)                                          Total Score [     5     ]   Caprini Score 0-2: Low risk, No VTE Prophylaxis required for most patient's, encourage ambulation  Caprini Score 3-6: At Risk, Pharmacologic VTE prophylaxis is indicated for most patients ( in the absence of a contraindication)  Caprini Score Greater than or = 7: High Risk , pharmacologic VTE is indicated for most patients ( in the absence of a contraindication)    Caprini score indicates that the patient is high risk for VTE event ( score 6 or greater). Surgical patient's in this group will benefit from both pharmacologic prophylaxis and intermittent compression devices . Surgical team will determine the balance between VTE  risk and bleeding risk and other clinical considerations

## 2023-10-27 PROBLEM — Z01.811 PREOP PULMONARY/RESPIRATORY EXAM: Status: ACTIVE | Noted: 2023-10-27

## 2023-10-27 LAB
MRSA PCR RESULT.: SIGNIFICANT CHANGE UP
MRSA PCR RESULT.: SIGNIFICANT CHANGE UP
S AUREUS DNA NOSE QL NAA+PROBE: SIGNIFICANT CHANGE UP
S AUREUS DNA NOSE QL NAA+PROBE: SIGNIFICANT CHANGE UP
VIT D25+D1,25 OH+D1,25 PNL SERPL-MCNC: 76.9 PG/ML — SIGNIFICANT CHANGE UP (ref 19.9–79.3)
VIT D25+D1,25 OH+D1,25 PNL SERPL-MCNC: 76.9 PG/ML — SIGNIFICANT CHANGE UP (ref 19.9–79.3)

## 2023-10-30 ENCOUNTER — RX RENEWAL (OUTPATIENT)
Age: 63
End: 2023-10-30

## 2023-10-31 ENCOUNTER — APPOINTMENT (OUTPATIENT)
Dept: ULTRASOUND IMAGING | Facility: CLINIC | Age: 63
End: 2023-10-31

## 2023-11-01 ENCOUNTER — APPOINTMENT (OUTPATIENT)
Dept: PAIN MANAGEMENT | Facility: CLINIC | Age: 63
End: 2023-11-01

## 2023-11-02 ENCOUNTER — TRANSCRIPTION ENCOUNTER (OUTPATIENT)
Age: 63
End: 2023-11-02

## 2023-11-03 ENCOUNTER — INPATIENT (INPATIENT)
Facility: HOSPITAL | Age: 63
LOS: 1 days | Discharge: HOME HEALTH | End: 2023-11-05
Attending: ORTHOPAEDIC SURGERY | Admitting: ORTHOPAEDIC SURGERY

## 2023-11-03 ENCOUNTER — TRANSCRIPTION ENCOUNTER (OUTPATIENT)
Age: 63
End: 2023-11-03

## 2023-11-03 ENCOUNTER — APPOINTMENT (OUTPATIENT)
Dept: ORTHOPEDIC SURGERY | Facility: HOSPITAL | Age: 63
End: 2023-11-03
Payer: MEDICARE

## 2023-11-03 VITALS
HEIGHT: 62 IN | WEIGHT: 151.9 LBS | SYSTOLIC BLOOD PRESSURE: 110 MMHG | DIASTOLIC BLOOD PRESSURE: 75 MMHG | TEMPERATURE: 98 F | RESPIRATION RATE: 16 BRPM | HEART RATE: 85 BPM | OXYGEN SATURATION: 97 %

## 2023-11-03 DIAGNOSIS — Z98.890 OTHER SPECIFIED POSTPROCEDURAL STATES: Chronic | ICD-10-CM

## 2023-11-03 DIAGNOSIS — Z98.1 ARTHRODESIS STATUS: Chronic | ICD-10-CM

## 2023-11-03 DIAGNOSIS — Z96.659 PRESENCE OF UNSPECIFIED ARTIFICIAL KNEE JOINT: Chronic | ICD-10-CM

## 2023-11-03 PROCEDURE — 22858 TOT DISC ARTHRP 2ND LVL CRV: CPT

## 2023-11-03 PROCEDURE — 22856 TOT DISC ARTHRP 1NTRSPC CRV: CPT

## 2023-11-03 DEVICE — DISC MOBI-C CERV 17X17 H5: Type: IMPLANTABLE DEVICE | Status: FUNCTIONAL

## 2023-11-03 DEVICE — DISC MOBI-C CERV 15X17 H5: Type: IMPLANTABLE DEVICE | Status: FUNCTIONAL

## 2023-11-03 DEVICE — BONE WAX 2.5GM: Type: IMPLANTABLE DEVICE | Status: FUNCTIONAL

## 2023-11-03 DEVICE — SURGIFLO MATRIX WITH THROMBIN KIT: Type: IMPLANTABLE DEVICE | Status: FUNCTIONAL

## 2023-11-03 DEVICE — DISTRACTION PIN 14MM (YELLOW): Type: IMPLANTABLE DEVICE | Status: FUNCTIONAL

## 2023-11-03 RX ORDER — HYDROMORPHONE HYDROCHLORIDE 2 MG/ML
0.5 INJECTION INTRAMUSCULAR; INTRAVENOUS; SUBCUTANEOUS EVERY 4 HOURS
Refills: 0 | Status: DISCONTINUED | OUTPATIENT
Start: 2023-11-03 | End: 2023-11-05

## 2023-11-03 RX ORDER — LUBIPROSTONE 24 UG/1
1 CAPSULE, GELATIN COATED ORAL
Qty: 0 | Refills: 0 | DISCHARGE

## 2023-11-03 RX ORDER — ASCORBIC ACID 60 MG
500 TABLET,CHEWABLE ORAL
Refills: 0 | Status: DISCONTINUED | OUTPATIENT
Start: 2023-11-03 | End: 2023-11-05

## 2023-11-03 RX ORDER — HYDROMORPHONE HYDROCHLORIDE 2 MG/ML
0.5 INJECTION INTRAMUSCULAR; INTRAVENOUS; SUBCUTANEOUS EVERY 4 HOURS
Refills: 0 | Status: DISCONTINUED | OUTPATIENT
Start: 2023-11-03 | End: 2023-11-03

## 2023-11-03 RX ORDER — OXYCODONE HYDROCHLORIDE 5 MG/1
5 TABLET ORAL EVERY 4 HOURS
Refills: 0 | Status: DISCONTINUED | OUTPATIENT
Start: 2023-11-03 | End: 2023-11-03

## 2023-11-03 RX ORDER — HYDROMORPHONE HYDROCHLORIDE 2 MG/ML
0.5 INJECTION INTRAMUSCULAR; INTRAVENOUS; SUBCUTANEOUS
Refills: 0 | Status: DISCONTINUED | OUTPATIENT
Start: 2023-11-03 | End: 2023-11-03

## 2023-11-03 RX ORDER — SODIUM CHLORIDE 9 MG/ML
3 INJECTION INTRAMUSCULAR; INTRAVENOUS; SUBCUTANEOUS EVERY 8 HOURS
Refills: 0 | Status: DISCONTINUED | OUTPATIENT
Start: 2023-11-03 | End: 2023-11-03

## 2023-11-03 RX ORDER — SODIUM CHLORIDE 9 MG/ML
1000 INJECTION, SOLUTION INTRAVENOUS
Refills: 0 | Status: DISCONTINUED | OUTPATIENT
Start: 2023-11-03 | End: 2023-11-03

## 2023-11-03 RX ORDER — PANTOPRAZOLE SODIUM 20 MG/1
40 TABLET, DELAYED RELEASE ORAL
Refills: 0 | Status: DISCONTINUED | OUTPATIENT
Start: 2023-11-03 | End: 2023-11-05

## 2023-11-03 RX ORDER — FLUTICASONE FUROATE, UMECLIDINIUM BROMIDE AND VILANTEROL TRIFENATATE 200; 62.5; 25 UG/1; UG/1; UG/1
0 POWDER RESPIRATORY (INHALATION)
Qty: 0 | Refills: 0 | DISCHARGE

## 2023-11-03 RX ORDER — ALBUTEROL 90 UG/1
2 AEROSOL, METERED ORAL
Qty: 0 | Refills: 0 | DISCHARGE

## 2023-11-03 RX ORDER — ACETAMINOPHEN 500 MG
1000 TABLET ORAL ONCE
Refills: 0 | Status: COMPLETED | OUTPATIENT
Start: 2023-11-03 | End: 2023-11-03

## 2023-11-03 RX ORDER — HYDROMORPHONE HYDROCHLORIDE 2 MG/ML
4 INJECTION INTRAMUSCULAR; INTRAVENOUS; SUBCUTANEOUS EVERY 4 HOURS
Refills: 0 | Status: DISCONTINUED | OUTPATIENT
Start: 2023-11-03 | End: 2023-11-05

## 2023-11-03 RX ORDER — ATORVASTATIN CALCIUM 80 MG/1
10 TABLET, FILM COATED ORAL AT BEDTIME
Refills: 0 | Status: DISCONTINUED | OUTPATIENT
Start: 2023-11-03 | End: 2023-11-05

## 2023-11-03 RX ORDER — OLMESARTAN MEDOXOMIL-HYDROCHLOROTHIAZIDE 25; 40 MG/1; MG/1
1 TABLET, FILM COATED ORAL
Qty: 0 | Refills: 0 | DISCHARGE

## 2023-11-03 RX ORDER — ALBUTEROL 90 UG/1
2 AEROSOL, METERED ORAL EVERY 6 HOURS
Refills: 0 | Status: DISCONTINUED | OUTPATIENT
Start: 2023-11-03 | End: 2023-11-05

## 2023-11-03 RX ORDER — METOCLOPRAMIDE HCL 10 MG
5 TABLET ORAL ONCE
Refills: 0 | Status: COMPLETED | OUTPATIENT
Start: 2023-11-03 | End: 2023-11-03

## 2023-11-03 RX ORDER — ATORVASTATIN CALCIUM 80 MG/1
1 TABLET, FILM COATED ORAL
Qty: 0 | Refills: 0 | DISCHARGE

## 2023-11-03 RX ORDER — CHOLECALCIFEROL (VITAMIN D3) 125 MCG
1 CAPSULE ORAL
Qty: 0 | Refills: 0 | DISCHARGE

## 2023-11-03 RX ORDER — FOLIC ACID 0.8 MG
1 TABLET ORAL DAILY
Refills: 0 | Status: DISCONTINUED | OUTPATIENT
Start: 2023-11-03 | End: 2023-11-05

## 2023-11-03 RX ORDER — BUPROPION HYDROCHLORIDE 150 MG/1
1 TABLET, EXTENDED RELEASE ORAL
Qty: 0 | Refills: 0 | DISCHARGE

## 2023-11-03 RX ORDER — APREPITANT 80 MG/1
40 CAPSULE ORAL ONCE
Refills: 0 | Status: COMPLETED | OUTPATIENT
Start: 2023-11-03 | End: 2023-11-03

## 2023-11-03 RX ORDER — HYDROMORPHONE HYDROCHLORIDE 2 MG/ML
2 INJECTION INTRAMUSCULAR; INTRAVENOUS; SUBCUTANEOUS EVERY 4 HOURS
Refills: 0 | Status: DISCONTINUED | OUTPATIENT
Start: 2023-11-03 | End: 2023-11-05

## 2023-11-03 RX ORDER — ACETAMINOPHEN 500 MG
650 TABLET ORAL EVERY 6 HOURS
Refills: 0 | Status: DISCONTINUED | OUTPATIENT
Start: 2023-11-03 | End: 2023-11-05

## 2023-11-03 RX ORDER — SODIUM CHLORIDE 9 MG/ML
1000 INJECTION, SOLUTION INTRAVENOUS
Refills: 0 | Status: DISCONTINUED | OUTPATIENT
Start: 2023-11-03 | End: 2023-11-05

## 2023-11-03 RX ORDER — ZAFIRLUKAST 10 MG/1
1 TABLET, COATED ORAL
Qty: 0 | Refills: 0 | DISCHARGE

## 2023-11-03 RX ORDER — VANCOMYCIN HCL 1 G
1000 VIAL (EA) INTRAVENOUS ONCE
Refills: 0 | Status: COMPLETED | OUTPATIENT
Start: 2023-11-03 | End: 2023-11-03

## 2023-11-03 RX ORDER — ONDANSETRON 8 MG/1
4 TABLET, FILM COATED ORAL EVERY 6 HOURS
Refills: 0 | Status: DISCONTINUED | OUTPATIENT
Start: 2023-11-03 | End: 2023-11-05

## 2023-11-03 RX ORDER — BUPROPION HYDROCHLORIDE 150 MG/1
150 TABLET, EXTENDED RELEASE ORAL DAILY
Refills: 0 | Status: DISCONTINUED | OUTPATIENT
Start: 2023-11-03 | End: 2023-11-05

## 2023-11-03 RX ORDER — CLONAZEPAM 1 MG
1 TABLET ORAL
Qty: 0 | Refills: 0 | DISCHARGE

## 2023-11-03 RX ORDER — SODIUM CHLORIDE 9 MG/ML
3 INJECTION INTRAMUSCULAR; INTRAVENOUS; SUBCUTANEOUS EVERY 8 HOURS
Refills: 0 | Status: DISCONTINUED | OUTPATIENT
Start: 2023-11-03 | End: 2023-11-05

## 2023-11-03 RX ORDER — OXYCODONE HYDROCHLORIDE 5 MG/1
10 TABLET ORAL EVERY 4 HOURS
Refills: 0 | Status: DISCONTINUED | OUTPATIENT
Start: 2023-11-03 | End: 2023-11-03

## 2023-11-03 RX ADMIN — HYDROMORPHONE HYDROCHLORIDE 4 MILLIGRAM(S): 2 INJECTION INTRAMUSCULAR; INTRAVENOUS; SUBCUTANEOUS at 20:21

## 2023-11-03 RX ADMIN — HYDROMORPHONE HYDROCHLORIDE 0.5 MILLIGRAM(S): 2 INJECTION INTRAMUSCULAR; INTRAVENOUS; SUBCUTANEOUS at 16:10

## 2023-11-03 RX ADMIN — APREPITANT 40 MILLIGRAM(S): 80 CAPSULE ORAL at 06:53

## 2023-11-03 RX ADMIN — Medication 1 MILLIGRAM(S): at 13:43

## 2023-11-03 RX ADMIN — HYDROMORPHONE HYDROCHLORIDE 0.5 MILLIGRAM(S): 2 INJECTION INTRAMUSCULAR; INTRAVENOUS; SUBCUTANEOUS at 10:20

## 2023-11-03 RX ADMIN — Medication 500 MILLIGRAM(S): at 17:58

## 2023-11-03 RX ADMIN — SODIUM CHLORIDE 75 MILLILITER(S): 9 INJECTION, SOLUTION INTRAVENOUS at 10:06

## 2023-11-03 RX ADMIN — ONDANSETRON 4 MILLIGRAM(S): 8 TABLET, FILM COATED ORAL at 17:57

## 2023-11-03 RX ADMIN — HYDROMORPHONE HYDROCHLORIDE 0.5 MILLIGRAM(S): 2 INJECTION INTRAMUSCULAR; INTRAVENOUS; SUBCUTANEOUS at 10:40

## 2023-11-03 RX ADMIN — Medication 30 MILLILITER(S): at 21:12

## 2023-11-03 RX ADMIN — HYDROMORPHONE HYDROCHLORIDE 0.5 MILLIGRAM(S): 2 INJECTION INTRAMUSCULAR; INTRAVENOUS; SUBCUTANEOUS at 10:06

## 2023-11-03 RX ADMIN — SODIUM CHLORIDE 100 MILLILITER(S): 9 INJECTION, SOLUTION INTRAVENOUS at 21:13

## 2023-11-03 RX ADMIN — HYDROMORPHONE HYDROCHLORIDE 0.5 MILLIGRAM(S): 2 INJECTION INTRAMUSCULAR; INTRAVENOUS; SUBCUTANEOUS at 15:55

## 2023-11-03 RX ADMIN — HYDROMORPHONE HYDROCHLORIDE 4 MILLIGRAM(S): 2 INJECTION INTRAMUSCULAR; INTRAVENOUS; SUBCUTANEOUS at 14:43

## 2023-11-03 RX ADMIN — PANTOPRAZOLE SODIUM 40 MILLIGRAM(S): 20 TABLET, DELAYED RELEASE ORAL at 18:23

## 2023-11-03 RX ADMIN — HYDROMORPHONE HYDROCHLORIDE 0.5 MILLIGRAM(S): 2 INJECTION INTRAMUSCULAR; INTRAVENOUS; SUBCUTANEOUS at 10:16

## 2023-11-03 RX ADMIN — SODIUM CHLORIDE 100 MILLILITER(S): 9 INJECTION, SOLUTION INTRAVENOUS at 12:06

## 2023-11-03 RX ADMIN — ONDANSETRON 4 MILLIGRAM(S): 8 TABLET, FILM COATED ORAL at 12:06

## 2023-11-03 RX ADMIN — HYDROMORPHONE HYDROCHLORIDE 0.5 MILLIGRAM(S): 2 INJECTION INTRAMUSCULAR; INTRAVENOUS; SUBCUTANEOUS at 21:27

## 2023-11-03 RX ADMIN — HYDROMORPHONE HYDROCHLORIDE 0.5 MILLIGRAM(S): 2 INJECTION INTRAMUSCULAR; INTRAVENOUS; SUBCUTANEOUS at 21:12

## 2023-11-03 RX ADMIN — Medication 1 TABLET(S): at 13:43

## 2023-11-03 RX ADMIN — HYDROMORPHONE HYDROCHLORIDE 4 MILLIGRAM(S): 2 INJECTION INTRAMUSCULAR; INTRAVENOUS; SUBCUTANEOUS at 19:34

## 2023-11-03 RX ADMIN — BUPROPION HYDROCHLORIDE 150 MILLIGRAM(S): 150 TABLET, EXTENDED RELEASE ORAL at 13:43

## 2023-11-03 RX ADMIN — HYDROMORPHONE HYDROCHLORIDE 0.5 MILLIGRAM(S): 2 INJECTION INTRAMUSCULAR; INTRAVENOUS; SUBCUTANEOUS at 10:32

## 2023-11-03 RX ADMIN — ATORVASTATIN CALCIUM 10 MILLIGRAM(S): 80 TABLET, FILM COATED ORAL at 21:12

## 2023-11-03 RX ADMIN — HYDROMORPHONE HYDROCHLORIDE 4 MILLIGRAM(S): 2 INJECTION INTRAMUSCULAR; INTRAVENOUS; SUBCUTANEOUS at 13:43

## 2023-11-03 RX ADMIN — Medication 250 MILLIGRAM(S): at 19:34

## 2023-11-03 RX ADMIN — Medication 400 MILLIGRAM(S): at 10:06

## 2023-11-03 RX ADMIN — Medication 5 MILLIGRAM(S): at 21:13

## 2023-11-03 NOTE — PHYSICAL THERAPY INITIAL EVALUATION ADULT - GAIT TRAINING, PT EVAL
in 2-3 days: independent with amb 200ft with RW, up and down 3 steps with 1 handrail with cane with Jodie

## 2023-11-03 NOTE — DISCHARGE NOTE PROVIDER - NSDCFUSCHEDAPPT_GEN_ALL_CORE_FT
Anjel Alvarez  [facility]  [address]  Scheduled Appointment: 11/14/2023    Yanira Nelson  [facility]  [facility] [address]  Scheduled Appointment: 12/29/2023    Stephan Salvador  [facility]  ORTHOSURG [address]  Scheduled Appointment: 01/05/2024    [facility]  P[address]ED [address]  Scheduled Appointment: 01/10/2024    Mathew Rao  [facility]  [address]  Scheduled Appointment: 01/10/2024    Stephan Salvador  [facility]  ORTHOSURG [address]  Scheduled Appointment: 01/17/2024

## 2023-11-03 NOTE — BRIEF OPERATIVE NOTE - NSICDXBRIEFPOSTOP_GEN_ALL_CORE_FT
Problem: Infant Inpatient Plan of Care  Goal: Plan of Care Review  Outcome: Ongoing, Progressing  Goal: Patient-Specific Goal (Individualized)  Outcome: Ongoing, Progressing  Goal: Absence of Hospital-Acquired Illness or Injury  Outcome: Ongoing, Progressing  Goal: Optimal Comfort and Wellbeing  Outcome: Ongoing, Progressing  Goal: Readiness for Transition of Care  Outcome: Ongoing, Progressing    Problem: Infection ([place])  Goal: Absence of Infection Signs and Symptoms  Outcome: Ongoing, Progressing     Problem: Oral Nutrition ()  Goal: Effective Oral Intake  Outcome: Ongoing, Progressing     Problem: Infant-Parent Attachment ([place])  Goal: Demonstration of Attachment Behaviors  Outcome: Ongoing, Progressing     Problem: Pain ()  Goal: Acceptable Level of Comfort and Activity  Outcome: Ongoing, Progressing     Problem: Respiratory Compromise ()  Goal: Effective Oxygenation and Ventilation  Outcome: Ongoing, Progressing     Problem: Skin Injury ([place])  Goal: Skin Health and Integrity  Outcome: Ongoing, Progressing     Problem: Temperature Instability ([place])  Goal: Temperature Stability  Outcome: Ongoing, Progressing     Problem: Communication Impairment (Mechanical Ventilation, Invasive)  Goal: Effective Communication  Outcome: Ongoing, Progressing     Problem: Device-Related Complication Risk (Mechanical Ventilation, Invasive)  Goal: Optimal Device Function  Outcome: Ongoing, Progressing     Problem: Skin and Tissue Injury (Mechanical Ventilation, Invasive)  Goal: Absence of Device-Related Skin or Tissue Injury  Outcome: Ongoing, Progressing     Problem: Ventilator-Induced Lung Injury (Mechanical Ventilation, Invasive)  Goal: Absence of Ventilator-Induced Lung Injury  Outcome: Ongoing, Progressing     Problem: Communication Impairment (Artificial Airway)  Goal: Effective Communication  Outcome: Ongoing, Progressing     Problem: Device-Related Complication Risk (Artificial  Airway)  Goal: Optimal Device Function  Outcome: Ongoing, Progressing     Problem: Skin and Tissue Injury (Artificial Airway)  Goal: Absence of Device-Related Skin or Tissue Injury  Outcome: Ongoing, Progressing      POST-OP DIAGNOSIS:  Degenerative arthritis of cervical spine 03-Nov-2023 10:21:57  Figueroa Solis

## 2023-11-03 NOTE — PROGRESS NOTE ADULT - SUBJECTIVE AND OBJECTIVE BOX
post op check  63yFemale s/p TDR C4-7 under general anesthesia. Pt tolerated procedure well without any Intra-Op complications. Pt was very nauseous post-op but starting to feel better.  Pt seen and examined in NAD. Pain controlled. Pt denies any new complaints. Pt denies CP/SOB/D/numbness/tingling/bowel or bladder dysfunction. (+) Garcia (+)tolerating PO Diet    PE:   Neck: Dressing c/d/i    B/L UE: Skin intact. +ROM shoulder/elbow/wrist/fingers. +ok/thumbsup/fingercross signs.  strength: 5/5.  RP2+ NVI.   B/L LE: Skin intact. +ROM hip/knee/ankle/toes. Ankle Dorsi/plantarflexion: 5/5. Calf: soft, compressible and nontender. DP/PT 2+ NVI.             A/P: 63yFemale s/p TDR C4-7.  d/c garcia in am  Pain controlled  PT: WBAT - spinal precautions   DVT ppx: SCDs and ambulation  Wound care, Isometric exercises, incentive spirometry   Discharge: planning Home Saturday  All the above discussed and understood by pt

## 2023-11-03 NOTE — PHYSICAL THERAPY INITIAL EVALUATION ADULT - NSACTIVITYREC_GEN_A_PT
Pt c/o nausea and vomitting with movement.  Pt feels better with rest breaks between position changes.  Pt walked to the chair using the rolling walker and c/o nausea.  Vitals remained WNL.  Pt was left seated OOB in chair in NAD with call bell within reach.  FÁTIMA qiu informed.  PT will follow Pt c/o nausea and vomitting with movement.  Pt feels better with rest breaks between position changes.  Pt walked to the chair using the rolling walker and c/o nausea.  Vitals remained WNL.  Pt was left seated OOB in chair in NAD with call bell within reach.  FÁTIMA Campos informed.  PT will follow

## 2023-11-03 NOTE — DISCHARGE NOTE PROVIDER - NSDCFUADDINST_GEN_ALL_CORE_FT
No bending/lifting/twisting/pulling/pushing/carrying or driving. No blood thinners (not limited to but including) aspirin, motrin, alleve, naproxen, etc.    May shower 2 days post op.  No direct water pressure over incision.  Change dressing daily as needed with a dry clean bandage.

## 2023-11-03 NOTE — PRE-OP CHECKLIST - TYPE OF SOLUTION
saline lock Otezla Pregnancy And Lactation Text: This medication is Pregnancy Category C and it isn't known if it is safe during pregnancy. It is unknown if it is excreted in breast milk.

## 2023-11-03 NOTE — DISCHARGE NOTE PROVIDER - NSDCCPTREATMENT_GEN_ALL_CORE_FT
PRINCIPAL PROCEDURE  Procedure: Replacement of cervical spinal disc, NOS  Findings and Treatment: C4-7

## 2023-11-03 NOTE — DISCHARGE NOTE PROVIDER - NSDCFUADDAPPT_GEN_ALL_CORE_FT
Follow up with your surgeon in two weeks. Call for appointment.    If you need more pain medications, call your surgeon's office. For medication refills or authorizations call [phone]    Make sure to have a bowel movement by 2 days after surgery. Take stool softners and laxatives as needed.    Call and schedule a follow up appointment with your primary care physician for repeat blood work (CBC and BMP) for post hospital discharge follow-up care.    Call your surgeon if you have increased redness/pain/drainage or fever. Return to ER for shortness of breath/calf tenderness.

## 2023-11-03 NOTE — PHYSICAL THERAPY INITIAL EVALUATION ADULT - ACTIVE RANGE OF MOTION EXAMINATION, REHAB EVAL
[place]. upper extremity Active ROM was WNL (within normal limits)/bilateral lower extremity Active ROM was WNL (within normal limits)

## 2023-11-03 NOTE — PRE-OP CHECKLIST - VIA
Called Tammy back re: status of hearing aids. Based on tracking, hearing aid was delivered today. She stated her mom is not hearing as well from set #2, but had to cancel Friday's appointment due to a family commitment. Patient will be seen on Thursday instead at 1:00 and repaired hearing aid will be dispensed on that date. All questions answered.   wheelchair

## 2023-11-03 NOTE — DISCHARGE NOTE PROVIDER - NSDCMRMEDTOKEN_GEN_ALL_CORE_FT
albuterol 90 mcg/inh inhalation powder: 2 puff(s) inhaled every 6 hours, As Needed  atorvastatin 10 mg oral tablet: 1 tab(s) orally once a day  buPROPion 150 mg/24 hours (XL) oral tablet, extended release: 1 tab(s) orally every 24 hours  clonazePAM 0.5 mg oral tablet: 1 tab(s) orally , As Needed  Medical Marijuana:   olmesartan-hydrochlorothiazide 20 mg-12.5 mg oral tablet: 1 tab(s) orally once a day  RABEprazole 20 mg oral delayed release tablet: 1 tab(s) orally once a day  Trelegy Ellipta: inhaled once a day  Vitamin D3 125 mcg (5000 intl units) oral capsule: 1 cap(s) orally once a day  zafirlukast 20 mg oral tablet: 1 tab(s) orally 2 times a day   albuterol 90 mcg/inh inhalation powder: 2 puff(s) inhaled every 6 hours, As Needed  atorvastatin 10 mg oral tablet: 1 tab(s) orally once a day  buPROPion 150 mg/24 hours (XL) oral tablet, extended release: 1 tab(s) orally every 24 hours  clonazePAM 0.5 mg oral tablet: 1 tab(s) orally , As Needed  Colace 100 mg oral capsule: 1 cap(s) orally once a day as needed for  constipation  Dilaudid 2 mg oral tablet: 1 tab(s) orally every 6 hours as needed for  severe pain MDD: 4  Medical Marijuana:   olmesartan-hydrochlorothiazide 20 mg-12.5 mg oral tablet: 1 tab(s) orally once a day  ondansetron 4 mg oral tablet: 1 tab(s) orally every 6 hours as needed for  nausea  Protonix 40 mg oral delayed release tablet: 1 tab(s) orally once a day  Trelegy Ellipta: inhaled once a day  Vitamin D3 125 mcg (5000 intl units) oral capsule: 1 cap(s) orally once a day  zafirlukast 20 mg oral tablet: 1 tab(s) orally 2 times a day

## 2023-11-03 NOTE — DISCHARGE NOTE PROVIDER - CARE PROVIDER_API CALL
Anjel Alvarez  Orthopaedic Surgery  [address] [facility]  [place] [id number]  Phone: [phone]  Fax: [phone]  Follow Up Time:

## 2023-11-03 NOTE — PATIENT PROFILE ADULT - FALL HARM RISK - UNIVERSAL INTERVENTIONS
Bed in lowest position, wheels locked, appropriate side rails in place/Call bell, personal items and telephone in reach/Instruct patient to call for assistance before getting out of bed or chair/Non-slip footwear when patient is out of bed/[place] to call system/Physically safe environment - no spills, clutter or unnecessary equipment/Purposeful Proactive Rounding/Room/bathroom lighting operational, light cord in reach

## 2023-11-04 ENCOUNTER — TRANSCRIPTION ENCOUNTER (OUTPATIENT)
Age: 63
End: 2023-11-04

## 2023-11-04 LAB
ANION GAP SERPL CALC-SCNC: 2 MMOL/L — LOW (ref 5–17)
ANION GAP SERPL CALC-SCNC: 2 MMOL/L — LOW (ref 5–17)
BASOPHILS # BLD AUTO: 0.01 K/UL — SIGNIFICANT CHANGE UP (ref 0–0.2)
BASOPHILS # BLD AUTO: 0.01 K/UL — SIGNIFICANT CHANGE UP (ref 0–0.2)
BASOPHILS NFR BLD AUTO: 0.1 % — SIGNIFICANT CHANGE UP (ref 0–2)
BASOPHILS NFR BLD AUTO: 0.1 % — SIGNIFICANT CHANGE UP (ref 0–2)
BUN SERPL-MCNC: 12 MG/DL — SIGNIFICANT CHANGE UP (ref 7–23)
BUN SERPL-MCNC: 12 MG/DL — SIGNIFICANT CHANGE UP (ref 7–23)
CALCIUM SERPL-MCNC: 9.1 MG/DL — SIGNIFICANT CHANGE UP (ref 8.5–10.1)
CALCIUM SERPL-MCNC: 9.1 MG/DL — SIGNIFICANT CHANGE UP (ref 8.5–10.1)
CHLORIDE SERPL-SCNC: 105 MMOL/L — SIGNIFICANT CHANGE UP (ref 96–108)
CHLORIDE SERPL-SCNC: 105 MMOL/L — SIGNIFICANT CHANGE UP (ref 96–108)
CO2 SERPL-SCNC: 30 MMOL/L — SIGNIFICANT CHANGE UP (ref 22–31)
CO2 SERPL-SCNC: 30 MMOL/L — SIGNIFICANT CHANGE UP (ref 22–31)
CREAT SERPL-MCNC: 0.76 MG/DL — SIGNIFICANT CHANGE UP (ref 0.5–1.3)
CREAT SERPL-MCNC: 0.76 MG/DL — SIGNIFICANT CHANGE UP (ref 0.5–1.3)
EGFR: 87 ML/MIN/1.73M2 — SIGNIFICANT CHANGE UP
EGFR: 87 ML/MIN/1.73M2 — SIGNIFICANT CHANGE UP
EOSINOPHIL # BLD AUTO: 0 K/UL — SIGNIFICANT CHANGE UP (ref 0–0.5)
EOSINOPHIL # BLD AUTO: 0 K/UL — SIGNIFICANT CHANGE UP (ref 0–0.5)
EOSINOPHIL NFR BLD AUTO: 0 % — SIGNIFICANT CHANGE UP (ref 0–6)
EOSINOPHIL NFR BLD AUTO: 0 % — SIGNIFICANT CHANGE UP (ref 0–6)
GLUCOSE SERPL-MCNC: 131 MG/DL — HIGH (ref 70–99)
GLUCOSE SERPL-MCNC: 131 MG/DL — HIGH (ref 70–99)
HCT VFR BLD CALC: 35 % — SIGNIFICANT CHANGE UP (ref 34.5–45)
HCT VFR BLD CALC: 35 % — SIGNIFICANT CHANGE UP (ref 34.5–45)
HGB BLD-MCNC: 11.2 G/DL — LOW (ref 11.5–15.5)
HGB BLD-MCNC: 11.2 G/DL — LOW (ref 11.5–15.5)
IMM GRANULOCYTES NFR BLD AUTO: 0.4 % — SIGNIFICANT CHANGE UP (ref 0–0.9)
IMM GRANULOCYTES NFR BLD AUTO: 0.4 % — SIGNIFICANT CHANGE UP (ref 0–0.9)
LYMPHOCYTES # BLD AUTO: 0.82 K/UL — LOW (ref 1–3.3)
LYMPHOCYTES # BLD AUTO: 0.82 K/UL — LOW (ref 1–3.3)
LYMPHOCYTES # BLD AUTO: 6 % — LOW (ref 13–44)
LYMPHOCYTES # BLD AUTO: 6 % — LOW (ref 13–44)
MCHC RBC-ENTMCNC: 30.1 PG — SIGNIFICANT CHANGE UP (ref 27–34)
MCHC RBC-ENTMCNC: 30.1 PG — SIGNIFICANT CHANGE UP (ref 27–34)
MCHC RBC-ENTMCNC: 32 G/DL — SIGNIFICANT CHANGE UP (ref 32–36)
MCHC RBC-ENTMCNC: 32 G/DL — SIGNIFICANT CHANGE UP (ref 32–36)
MCV RBC AUTO: 94.1 FL — SIGNIFICANT CHANGE UP (ref 80–100)
MCV RBC AUTO: 94.1 FL — SIGNIFICANT CHANGE UP (ref 80–100)
MONOCYTES # BLD AUTO: 0.98 K/UL — HIGH (ref 0–0.9)
MONOCYTES # BLD AUTO: 0.98 K/UL — HIGH (ref 0–0.9)
MONOCYTES NFR BLD AUTO: 7.2 % — SIGNIFICANT CHANGE UP (ref 2–14)
MONOCYTES NFR BLD AUTO: 7.2 % — SIGNIFICANT CHANGE UP (ref 2–14)
NEUTROPHILS # BLD AUTO: 11.78 K/UL — HIGH (ref 1.8–7.4)
NEUTROPHILS # BLD AUTO: 11.78 K/UL — HIGH (ref 1.8–7.4)
NEUTROPHILS NFR BLD AUTO: 86.3 % — HIGH (ref 43–77)
NEUTROPHILS NFR BLD AUTO: 86.3 % — HIGH (ref 43–77)
NRBC # BLD: 0 /100 WBCS — SIGNIFICANT CHANGE UP (ref 0–0)
NRBC # BLD: 0 /100 WBCS — SIGNIFICANT CHANGE UP (ref 0–0)
PLATELET # BLD AUTO: 308 K/UL — SIGNIFICANT CHANGE UP (ref 150–400)
PLATELET # BLD AUTO: 308 K/UL — SIGNIFICANT CHANGE UP (ref 150–400)
POTASSIUM SERPL-MCNC: 4.8 MMOL/L — SIGNIFICANT CHANGE UP (ref 3.5–5.3)
POTASSIUM SERPL-MCNC: 4.8 MMOL/L — SIGNIFICANT CHANGE UP (ref 3.5–5.3)
POTASSIUM SERPL-SCNC: 4.8 MMOL/L — SIGNIFICANT CHANGE UP (ref 3.5–5.3)
POTASSIUM SERPL-SCNC: 4.8 MMOL/L — SIGNIFICANT CHANGE UP (ref 3.5–5.3)
RBC # BLD: 3.72 M/UL — LOW (ref 3.8–5.2)
RBC # BLD: 3.72 M/UL — LOW (ref 3.8–5.2)
RBC # FLD: 13.3 % — SIGNIFICANT CHANGE UP (ref 10.3–14.5)
RBC # FLD: 13.3 % — SIGNIFICANT CHANGE UP (ref 10.3–14.5)
SODIUM SERPL-SCNC: 137 MMOL/L — SIGNIFICANT CHANGE UP (ref 135–145)
SODIUM SERPL-SCNC: 137 MMOL/L — SIGNIFICANT CHANGE UP (ref 135–145)
WBC # BLD: 13.65 K/UL — HIGH (ref 3.8–10.5)
WBC # BLD: 13.65 K/UL — HIGH (ref 3.8–10.5)
WBC # FLD AUTO: 13.65 K/UL — HIGH (ref 3.8–10.5)
WBC # FLD AUTO: 13.65 K/UL — HIGH (ref 3.8–10.5)

## 2023-11-04 RX ADMIN — PANTOPRAZOLE SODIUM 40 MILLIGRAM(S): 20 TABLET, DELAYED RELEASE ORAL at 06:16

## 2023-11-04 RX ADMIN — Medication 1 MILLIGRAM(S): at 13:33

## 2023-11-04 RX ADMIN — HYDROMORPHONE HYDROCHLORIDE 0.5 MILLIGRAM(S): 2 INJECTION INTRAMUSCULAR; INTRAVENOUS; SUBCUTANEOUS at 08:34

## 2023-11-04 RX ADMIN — HYDROMORPHONE HYDROCHLORIDE 0.5 MILLIGRAM(S): 2 INJECTION INTRAMUSCULAR; INTRAVENOUS; SUBCUTANEOUS at 02:29

## 2023-11-04 RX ADMIN — HYDROMORPHONE HYDROCHLORIDE 4 MILLIGRAM(S): 2 INJECTION INTRAMUSCULAR; INTRAVENOUS; SUBCUTANEOUS at 01:59

## 2023-11-04 RX ADMIN — HYDROMORPHONE HYDROCHLORIDE 0.5 MILLIGRAM(S): 2 INJECTION INTRAMUSCULAR; INTRAVENOUS; SUBCUTANEOUS at 08:49

## 2023-11-04 RX ADMIN — BUPROPION HYDROCHLORIDE 150 MILLIGRAM(S): 150 TABLET, EXTENDED RELEASE ORAL at 17:04

## 2023-11-04 RX ADMIN — SODIUM CHLORIDE 100 MILLILITER(S): 9 INJECTION, SOLUTION INTRAVENOUS at 06:16

## 2023-11-04 RX ADMIN — Medication 650 MILLIGRAM(S): at 10:45

## 2023-11-04 RX ADMIN — Medication 650 MILLIGRAM(S): at 11:45

## 2023-11-04 RX ADMIN — HYDROMORPHONE HYDROCHLORIDE 4 MILLIGRAM(S): 2 INJECTION INTRAMUSCULAR; INTRAVENOUS; SUBCUTANEOUS at 21:02

## 2023-11-04 RX ADMIN — HYDROMORPHONE HYDROCHLORIDE 4 MILLIGRAM(S): 2 INJECTION INTRAMUSCULAR; INTRAVENOUS; SUBCUTANEOUS at 06:25

## 2023-11-04 RX ADMIN — HYDROMORPHONE HYDROCHLORIDE 4 MILLIGRAM(S): 2 INJECTION INTRAMUSCULAR; INTRAVENOUS; SUBCUTANEOUS at 07:25

## 2023-11-04 RX ADMIN — HYDROMORPHONE HYDROCHLORIDE 4 MILLIGRAM(S): 2 INJECTION INTRAMUSCULAR; INTRAVENOUS; SUBCUTANEOUS at 17:05

## 2023-11-04 RX ADMIN — SODIUM CHLORIDE 3 MILLILITER(S): 9 INJECTION INTRAMUSCULAR; INTRAVENOUS; SUBCUTANEOUS at 22:37

## 2023-11-04 RX ADMIN — HYDROMORPHONE HYDROCHLORIDE 4 MILLIGRAM(S): 2 INJECTION INTRAMUSCULAR; INTRAVENOUS; SUBCUTANEOUS at 18:05

## 2023-11-04 RX ADMIN — Medication 500 MILLIGRAM(S): at 17:04

## 2023-11-04 RX ADMIN — HYDROMORPHONE HYDROCHLORIDE 0.5 MILLIGRAM(S): 2 INJECTION INTRAMUSCULAR; INTRAVENOUS; SUBCUTANEOUS at 13:45

## 2023-11-04 RX ADMIN — ATORVASTATIN CALCIUM 10 MILLIGRAM(S): 80 TABLET, FILM COATED ORAL at 21:02

## 2023-11-04 RX ADMIN — HYDROMORPHONE HYDROCHLORIDE 4 MILLIGRAM(S): 2 INJECTION INTRAMUSCULAR; INTRAVENOUS; SUBCUTANEOUS at 00:59

## 2023-11-04 RX ADMIN — SODIUM CHLORIDE 3 MILLILITER(S): 9 INJECTION INTRAMUSCULAR; INTRAVENOUS; SUBCUTANEOUS at 13:24

## 2023-11-04 RX ADMIN — HYDROMORPHONE HYDROCHLORIDE 0.5 MILLIGRAM(S): 2 INJECTION INTRAMUSCULAR; INTRAVENOUS; SUBCUTANEOUS at 02:44

## 2023-11-04 RX ADMIN — HYDROMORPHONE HYDROCHLORIDE 4 MILLIGRAM(S): 2 INJECTION INTRAMUSCULAR; INTRAVENOUS; SUBCUTANEOUS at 22:00

## 2023-11-04 RX ADMIN — Medication 1 TABLET(S): at 13:33

## 2023-11-04 RX ADMIN — Medication 500 MILLIGRAM(S): at 06:16

## 2023-11-04 RX ADMIN — HYDROMORPHONE HYDROCHLORIDE 0.5 MILLIGRAM(S): 2 INJECTION INTRAMUSCULAR; INTRAVENOUS; SUBCUTANEOUS at 13:30

## 2023-11-04 NOTE — PROGRESS NOTE ADULT - SUBJECTIVE AND OBJECTIVE BOX
Patient seen and examined at bedside. Patient reports pain well controlled on medications. No acute events overnight. Pt denies fevers, chills, new onset numbness, weakness or tingling in the extremities.    Vital Signs (24 Hrs):  T(C): 36.7 (11-04-23 @ 06:12), Max: 36.7 (11-03-23 @ 09:57)  HR: 98 (11-04-23 @ 06:12) (73 - 98)  BP: 122/74 (11-04-23 @ 06:12) (112/76 - 133/85)  RR: 16 (11-04-23 @ 06:12) (11 - 25)  SpO2: 93% (11-04-23 @ 06:12) (93% - 98%)  Wt(kg): --    LABS:                          11.2   13.65 )-----------( 308      ( 04 Nov 2023 06:33 )             35.0     11-04    137  |  105  |  12  ----------------------------<  131<H>  4.8   |  30  |  0.76    Ca    9.1      04 Nov 2023 06:33      Physical Exam:  General: NAD   Neck: Dressing c/d/i    B/L UE: Skin intact. +ROM shoulder/elbow/wrist/fingers. +ok/thumbsup/fingercross signs.  strength: 5/5.  RP2+ NVI.   B/L LE: Skin intact. +ROM hip/knee/ankle/toes. Ankle Dorsi/plantarflexion: 5/5. Calf: soft, compressible and nontender. DP/PT 2+ NVI.     A&P:  A/P: 63yFemale s/p TDR C4-7 POD1    Ni dangelo'pearl today  Pain controlled  PT: WBAT - spinal precautions   DVT ppx: SCDs and ambulation  Wound care, Isometric exercises, incentive spirometry   Discharge: planning Home Today  All the above discussed and understood by pt   Will discuss plan with Dr. Alvarez and will advise changes to plan as needed

## 2023-11-04 NOTE — DISCHARGE NOTE NURSING/CASE MANAGEMENT/SOCIAL WORK - PATIENT PORTAL LINK FT
You can access the FollowMyHealth Patient Portal offered by [facility] by registering at the following website: [email or web address]. By joining [facility]’s FollowMyHealth portal, you will also be able to view your health information using other applications (apps) compatible with our system.

## 2023-11-04 NOTE — DISCHARGE NOTE NURSING/CASE MANAGEMENT/SOCIAL WORK - NSDCFUADDAPPT_GEN_ALL_CORE_FT
Follow up with your surgeon in two weeks. Call for appointment.    If you need more pain medications, call your surgeon's office. For medication refills or authorizations call [phone]    Make sure to have a bowel movement by 2 days after surgery. Take stool softners and laxatives as needed.    Call and schedule a follow up appointment with your primary care physician for repeat blood work (CBC and BMP) for post hospital discharge follow-up care.    Call your surgeon if you have increased redness/pain/drainage or fever. Return to ER for shortness of breath/calf tenderness.

## 2023-11-04 NOTE — DISCHARGE NOTE NURSING/CASE MANAGEMENT/SOCIAL WORK - NSDCPEFALRISK_GEN_ALL_CORE
For information on Fall & Injury Prevention, visit: [email or web address] OR  [email or web address] OR  https://www.cdc.gov/steadi/patient.html

## 2023-11-05 VITALS
RESPIRATION RATE: 18 BRPM | DIASTOLIC BLOOD PRESSURE: 85 MMHG | SYSTOLIC BLOOD PRESSURE: 144 MMHG | HEART RATE: 77 BPM | OXYGEN SATURATION: 93 % | TEMPERATURE: 98 F

## 2023-11-05 RX ORDER — BENZOCAINE AND MENTHOL 5; 1 G/100ML; G/100ML
1 LIQUID ORAL
Refills: 0 | Status: DISCONTINUED | OUTPATIENT
Start: 2023-11-05 | End: 2023-11-05

## 2023-11-05 RX ORDER — HYDROMORPHONE HYDROCHLORIDE 2 MG/ML
1 INJECTION INTRAMUSCULAR; INTRAVENOUS; SUBCUTANEOUS
Qty: 28 | Refills: 0
Start: 2023-11-05 | End: 2023-11-11

## 2023-11-05 RX ORDER — PANTOPRAZOLE SODIUM 20 MG/1
1 TABLET, DELAYED RELEASE ORAL
Qty: 30 | Refills: 0
Start: 2023-11-05 | End: 2023-12-04

## 2023-11-05 RX ORDER — DOCUSATE SODIUM 100 MG
1 CAPSULE ORAL
Qty: 7 | Refills: 0
Start: 2023-11-05 | End: 2023-11-11

## 2023-11-05 RX ORDER — ONDANSETRON 8 MG/1
1 TABLET, FILM COATED ORAL
Qty: 28 | Refills: 0
Start: 2023-11-05 | End: 2023-11-11

## 2023-11-05 RX ORDER — RABEPRAZOLE 20 MG/1
1 TABLET, DELAYED RELEASE ORAL
Qty: 0 | Refills: 0 | DISCHARGE

## 2023-11-05 RX ADMIN — BUPROPION HYDROCHLORIDE 150 MILLIGRAM(S): 150 TABLET, EXTENDED RELEASE ORAL at 12:40

## 2023-11-05 RX ADMIN — HYDROMORPHONE HYDROCHLORIDE 0.5 MILLIGRAM(S): 2 INJECTION INTRAMUSCULAR; INTRAVENOUS; SUBCUTANEOUS at 02:20

## 2023-11-05 RX ADMIN — BENZOCAINE AND MENTHOL 1 LOZENGE: 5; 1 LIQUID ORAL at 12:21

## 2023-11-05 RX ADMIN — HYDROMORPHONE HYDROCHLORIDE 4 MILLIGRAM(S): 2 INJECTION INTRAMUSCULAR; INTRAVENOUS; SUBCUTANEOUS at 09:40

## 2023-11-05 RX ADMIN — HYDROMORPHONE HYDROCHLORIDE 4 MILLIGRAM(S): 2 INJECTION INTRAMUSCULAR; INTRAVENOUS; SUBCUTANEOUS at 10:30

## 2023-11-05 RX ADMIN — Medication 500 MILLIGRAM(S): at 05:33

## 2023-11-05 RX ADMIN — PANTOPRAZOLE SODIUM 40 MILLIGRAM(S): 20 TABLET, DELAYED RELEASE ORAL at 05:33

## 2023-11-05 RX ADMIN — HYDROMORPHONE HYDROCHLORIDE 0.5 MILLIGRAM(S): 2 INJECTION INTRAMUSCULAR; INTRAVENOUS; SUBCUTANEOUS at 01:27

## 2023-11-05 RX ADMIN — Medication 650 MILLIGRAM(S): at 01:27

## 2023-11-05 RX ADMIN — HYDROMORPHONE HYDROCHLORIDE 4 MILLIGRAM(S): 2 INJECTION INTRAMUSCULAR; INTRAVENOUS; SUBCUTANEOUS at 06:30

## 2023-11-05 RX ADMIN — SODIUM CHLORIDE 3 MILLILITER(S): 9 INJECTION INTRAMUSCULAR; INTRAVENOUS; SUBCUTANEOUS at 05:27

## 2023-11-05 RX ADMIN — Medication 1 MILLIGRAM(S): at 12:25

## 2023-11-05 RX ADMIN — Medication 650 MILLIGRAM(S): at 02:20

## 2023-11-05 RX ADMIN — HYDROMORPHONE HYDROCHLORIDE 4 MILLIGRAM(S): 2 INJECTION INTRAMUSCULAR; INTRAVENOUS; SUBCUTANEOUS at 05:32

## 2023-11-05 RX ADMIN — Medication 1 TABLET(S): at 12:24

## 2023-11-05 NOTE — PROGRESS NOTE ADULT - SUBJECTIVE AND OBJECTIVE BOX
Patient seen and examined at bedside. Patient reports pain well controlled on medications. No acute events overnight. Pt denies fevers, chills, new onset numbness, weakness or tingling in the extremities.    T(C): 36.6 (11-05-23 @ 05:30), Max: 36.8 (11-05-23 @ 01:06)  T(F): 97.8 (11-05-23 @ 05:30), Max: 98.3 (11-05-23 @ 01:06)  HR: 77 (11-05-23 @ 05:30) (77 - 102)  BP: 128/82 (11-05-23 @ 05:30) (114/74 - 141/92)  RR: 17 (11-05-23 @ 05:30) (16 - 18)  SpO2: 96% (11-05-23 @ 05:30) (94% - 98%)    Physical Exam:  General: NAD   Neck: Dressing c/d/i    B/L UE: Skin intact. +ROM shoulder/elbow/wrist/fingers. +ok/thumbsup/fingercross signs.  strength: 5/5.  RP2+ NVI.   B/L LE: Skin intact. +ROM hip/knee/ankle/toes. Ankle Dorsi/plantarflexion: 5/5. Calf: soft, compressible and nontender. DP/PT 2+ NVI.     A&P:  A/P: 63yFemale s/p TDR C4-7     Pain controlled  PT: WBAT - spinal precautions   DVT ppx: SCDs and ambulation  Wound care, Isometric exercises, incentive spirometry   Discharge: planning Home Today  All the above discussed and understood by pt   Will discuss plan with Dr. Alvarez and will advise changes to plan as needed

## 2023-11-08 DIAGNOSIS — M48.02 SPINAL STENOSIS, CERVICAL REGION: ICD-10-CM

## 2023-11-08 DIAGNOSIS — Z79.52 LONG TERM (CURRENT) USE OF SYSTEMIC STEROIDS: ICD-10-CM

## 2023-11-08 DIAGNOSIS — K21.9 GASTRO-ESOPHAGEAL REFLUX DISEASE WITHOUT ESOPHAGITIS: ICD-10-CM

## 2023-11-08 DIAGNOSIS — Z86.16 PERSONAL HISTORY OF COVID-19: ICD-10-CM

## 2023-11-08 DIAGNOSIS — R26.0 ATAXIC GAIT: ICD-10-CM

## 2023-11-08 DIAGNOSIS — G47.33 OBSTRUCTIVE SLEEP APNEA (ADULT) (PEDIATRIC): ICD-10-CM

## 2023-11-08 DIAGNOSIS — Z96.652 PRESENCE OF LEFT ARTIFICIAL KNEE JOINT: ICD-10-CM

## 2023-11-08 DIAGNOSIS — R11.0 NAUSEA: ICD-10-CM

## 2023-11-08 DIAGNOSIS — Z87.891 PERSONAL HISTORY OF NICOTINE DEPENDENCE: ICD-10-CM

## 2023-11-08 DIAGNOSIS — J45.990 EXERCISE INDUCED BRONCHOSPASM: ICD-10-CM

## 2023-11-08 DIAGNOSIS — Z91.048 OTHER NONMEDICINAL SUBSTANCE ALLERGY STATUS: ICD-10-CM

## 2023-11-08 DIAGNOSIS — I10 ESSENTIAL (PRIMARY) HYPERTENSION: ICD-10-CM

## 2023-11-08 DIAGNOSIS — Z79.51 LONG TERM (CURRENT) USE OF INHALED STEROIDS: ICD-10-CM

## 2023-11-08 DIAGNOSIS — E78.5 HYPERLIPIDEMIA, UNSPECIFIED: ICD-10-CM

## 2023-11-08 DIAGNOSIS — M47.22 OTHER SPONDYLOSIS WITH RADICULOPATHY, CERVICAL REGION: ICD-10-CM

## 2023-11-10 ENCOUNTER — TRANSCRIPTION ENCOUNTER (OUTPATIENT)
Age: 63
End: 2023-11-10

## 2023-11-10 RX ORDER — ONDANSETRON 4 MG/1
4 TABLET ORAL
Qty: 20 | Refills: 0 | Status: ACTIVE | COMMUNITY
Start: 2023-11-10 | End: 1900-01-01

## 2023-11-10 RX ORDER — OXYCODONE AND ACETAMINOPHEN 10; 325 MG/1; MG/1
10-325 TABLET ORAL
Qty: 40 | Refills: 0 | Status: ACTIVE | COMMUNITY
Start: 2023-11-10 | End: 1900-01-01

## 2023-11-10 RX ORDER — TIZANIDINE 4 MG/1
4 TABLET ORAL EVERY 8 HOURS
Qty: 30 | Refills: 0 | Status: ACTIVE | COMMUNITY
Start: 2023-11-10 | End: 1900-01-01

## 2023-11-13 ENCOUNTER — APPOINTMENT (OUTPATIENT)
Dept: PAIN MANAGEMENT | Facility: CLINIC | Age: 63
End: 2023-11-13

## 2023-11-14 ENCOUNTER — APPOINTMENT (OUTPATIENT)
Dept: ORTHOPEDIC SURGERY | Facility: CLINIC | Age: 63
End: 2023-11-14
Payer: MEDICARE

## 2023-11-14 DIAGNOSIS — M54.12 RADICULOPATHY, CERVICAL REGION: ICD-10-CM

## 2023-11-14 PROBLEM — I10 ESSENTIAL (PRIMARY) HYPERTENSION: Chronic | Status: ACTIVE | Noted: 2023-10-26

## 2023-11-14 PROCEDURE — 72040 X-RAY EXAM NECK SPINE 2-3 VW: CPT

## 2023-11-14 PROCEDURE — 99024 POSTOP FOLLOW-UP VISIT: CPT

## 2023-11-20 ENCOUNTER — OUTPATIENT (OUTPATIENT)
Dept: OUTPATIENT SERVICES | Facility: HOSPITAL | Age: 63
LOS: 1 days | End: 2023-11-20
Payer: MEDICARE

## 2023-11-20 ENCOUNTER — APPOINTMENT (OUTPATIENT)
Dept: ORTHOPEDIC SURGERY | Facility: CLINIC | Age: 63
End: 2023-11-20
Payer: MEDICARE

## 2023-11-20 ENCOUNTER — APPOINTMENT (OUTPATIENT)
Dept: ULTRASOUND IMAGING | Facility: IMAGING CENTER | Age: 63
End: 2023-11-20
Payer: MEDICARE

## 2023-11-20 VITALS — WEIGHT: 149 LBS | HEIGHT: 62 IN | BODY MASS INDEX: 27.42 KG/M2

## 2023-11-20 DIAGNOSIS — Z98.890 OTHER SPECIFIED POSTPROCEDURAL STATES: Chronic | ICD-10-CM

## 2023-11-20 DIAGNOSIS — Z96.652 PRESENCE OF LEFT ARTIFICIAL KNEE JOINT: ICD-10-CM

## 2023-11-20 DIAGNOSIS — Z98.1 ARTHRODESIS STATUS: Chronic | ICD-10-CM

## 2023-11-20 DIAGNOSIS — Z00.8 ENCOUNTER FOR OTHER GENERAL EXAMINATION: ICD-10-CM

## 2023-11-20 PROCEDURE — 76705 ECHO EXAM OF ABDOMEN: CPT

## 2023-11-20 PROCEDURE — 73562 X-RAY EXAM OF KNEE 3: CPT | Mod: LT

## 2023-11-20 PROCEDURE — 76705 ECHO EXAM OF ABDOMEN: CPT | Mod: 26

## 2023-11-20 PROCEDURE — 99213 OFFICE O/P EST LOW 20 MIN: CPT

## 2023-11-21 ENCOUNTER — OUTPATIENT (OUTPATIENT)
Dept: OUTPATIENT SERVICES | Facility: HOSPITAL | Age: 63
LOS: 1 days | End: 2023-11-21
Payer: MEDICARE

## 2023-11-21 ENCOUNTER — APPOINTMENT (OUTPATIENT)
Dept: MRI IMAGING | Facility: CLINIC | Age: 63
End: 2023-11-21
Payer: MEDICARE

## 2023-11-21 DIAGNOSIS — Z98.890 OTHER SPECIFIED POSTPROCEDURAL STATES: Chronic | ICD-10-CM

## 2023-11-21 DIAGNOSIS — Z98.1 ARTHRODESIS STATUS: Chronic | ICD-10-CM

## 2023-11-21 DIAGNOSIS — Z96.659 PRESENCE OF UNSPECIFIED ARTIFICIAL KNEE JOINT: Chronic | ICD-10-CM

## 2023-11-21 DIAGNOSIS — Z96.652 PRESENCE OF LEFT ARTIFICIAL KNEE JOINT: ICD-10-CM

## 2023-11-21 PROCEDURE — 73721 MRI JNT OF LWR EXTRE W/O DYE: CPT | Mod: 26,LT,MH

## 2023-11-21 PROCEDURE — 73721 MRI JNT OF LWR EXTRE W/O DYE: CPT

## 2023-11-29 ENCOUNTER — RX RENEWAL (OUTPATIENT)
Age: 63
End: 2023-11-29

## 2023-12-01 ENCOUNTER — APPOINTMENT (OUTPATIENT)
Dept: ORTHOPEDIC SURGERY | Facility: CLINIC | Age: 63
End: 2023-12-01
Payer: MEDICARE

## 2023-12-01 DIAGNOSIS — M25.462 EFFUSION, LEFT KNEE: ICD-10-CM

## 2023-12-01 PROCEDURE — G2012 BRIEF CHECK IN BY MD/QHP: CPT

## 2023-12-11 ENCOUNTER — RX RENEWAL (OUTPATIENT)
Age: 63
End: 2023-12-11

## 2023-12-14 ENCOUNTER — APPOINTMENT (OUTPATIENT)
Dept: ORTHOPEDIC SURGERY | Facility: CLINIC | Age: 63
End: 2023-12-14
Payer: MEDICARE

## 2023-12-14 ENCOUNTER — APPOINTMENT (OUTPATIENT)
Dept: FAMILY MEDICINE | Facility: CLINIC | Age: 63
End: 2023-12-14

## 2023-12-14 VITALS — BODY MASS INDEX: 27.42 KG/M2 | WEIGHT: 149 LBS | HEIGHT: 62 IN

## 2023-12-14 DIAGNOSIS — M62.838 OTHER MUSCLE SPASM: ICD-10-CM

## 2023-12-14 PROCEDURE — 99024 POSTOP FOLLOW-UP VISIT: CPT

## 2023-12-14 PROCEDURE — 72040 X-RAY EXAM NECK SPINE 2-3 VW: CPT

## 2023-12-14 RX ORDER — TIZANIDINE 4 MG/1
4 TABLET ORAL TWICE DAILY
Qty: 20 | Refills: 0 | Status: ACTIVE | COMMUNITY
Start: 2023-12-14 | End: 1900-01-01

## 2023-12-14 NOTE — PHYSICAL EXAM
[Implants in position] : Implants in position [No loosening of hardware] : No loosening of hardware [de-identified] : c/d/i wound full rom with mild stiffness - improved R C8 dermatome of pain and sensitivity full strength  [FreeTextEntry1] : C4-7 CDR.

## 2023-12-14 NOTE — HISTORY OF PRESENT ILLNESS
[Neck] : neck [5] : 5 [6] : 6 [Dull/Aching] : dull/aching [Constant] : constant [Nothing helps with pain getting better] : Nothing helps with pain getting better [de-identified] : DOS: C4-7 CDR on 11/03/23 12/14/2023 Here for POV#2. 5 weeks s/p CDR C4-57. Has been feeling pain/stiffness for the past couple weeks; worse on left side. Pain/numbness down b/l arms. Pain when turning head left to right and vice versa. Taking diclofenac for knee pain, experiencing AE.   11/10/23: first pov s/p CDR C4-7 11/3/23; doing well; the severe pain into the RUE into the pinky is improving but still there; stopped the pain meds; taking ibuprofen/tylenol prn. no f/chills/drainage [] : no [FreeTextEntry5] : PO#2- C Spine. [FreeTextEntry7] : b/l arms

## 2023-12-14 NOTE — ASSESSMENT
[FreeTextEntry1] : PO#2. 5 weeks s/p C4-7 CDR  CS xr's w/ implants in position, no loosen hardware.  C/o of left sided neck pain and b/l numbness, especially at night.  D/c Diclofenac due to AE, start Tizanidine, start PT to address residual spasms.  F/up in 6 weeks.

## 2023-12-18 ENCOUNTER — RX RENEWAL (OUTPATIENT)
Age: 63
End: 2023-12-18

## 2023-12-27 ENCOUNTER — RX RENEWAL (OUTPATIENT)
Age: 63
End: 2023-12-27

## 2023-12-27 RX ORDER — AZELASTINE HYDROCHLORIDE 137 UG/1
137 SPRAY, METERED NASAL
Qty: 3 | Refills: 1 | Status: ACTIVE | COMMUNITY
Start: 2023-12-27 | End: 1900-01-01

## 2023-12-28 ENCOUNTER — RX RENEWAL (OUTPATIENT)
Age: 63
End: 2023-12-28

## 2024-01-05 ENCOUNTER — APPOINTMENT (OUTPATIENT)
Dept: ORTHOPEDIC SURGERY | Facility: CLINIC | Age: 64
End: 2024-01-05

## 2024-01-09 ENCOUNTER — RX RENEWAL (OUTPATIENT)
Age: 64
End: 2024-01-09

## 2024-01-10 ENCOUNTER — APPOINTMENT (OUTPATIENT)
Dept: PULMONOLOGY | Facility: CLINIC | Age: 64
End: 2024-01-10

## 2024-01-10 RX ORDER — RABEPRAZOLE SODIUM 20 MG/1
20 TABLET, DELAYED RELEASE ORAL
Qty: 90 | Refills: 1 | Status: ACTIVE | COMMUNITY
Start: 2017-08-24 | End: 1900-01-01

## 2024-01-16 ENCOUNTER — NON-APPOINTMENT (OUTPATIENT)
Age: 64
End: 2024-01-16

## 2024-01-17 ENCOUNTER — APPOINTMENT (OUTPATIENT)
Dept: ORTHOPEDIC SURGERY | Facility: CLINIC | Age: 64
End: 2024-01-17
Payer: MEDICARE

## 2024-01-17 VITALS — WEIGHT: 149 LBS | HEIGHT: 62 IN | BODY MASS INDEX: 27.42 KG/M2

## 2024-01-17 DIAGNOSIS — M25.462 EFFUSION, LEFT KNEE: ICD-10-CM

## 2024-01-17 PROCEDURE — 73562 X-RAY EXAM OF KNEE 3: CPT | Mod: LT

## 2024-01-17 PROCEDURE — 99213 OFFICE O/P EST LOW 20 MIN: CPT

## 2024-01-17 NOTE — HISTORY OF PRESENT ILLNESS
[de-identified] : RASHARD GALLEGOS  63 year old female presents for evaluation of left revision TKR done 1 year ago. She is still having problems after prolonged activities which she notes having swelling and clicking, especially at night where she has to sleep with a flexed knee. She takes Diclofenac which she wants to lower her current dose due to stomach issues. She is recovering for neck surgery done November 2023 which she attends PT where she also works on her leg too. She uses a stationary bike at home and uses a knee sleeve for ambulation.

## 2024-01-17 NOTE — ADDENDUM
[FreeTextEntry1] : This note was written by Kemi Dos Santos on 01/17/2024 acting as scribe for Dr. Stephan ORR I, Dr. Stephan Salvador, have read and attest that all the information, medical decision making and discharge instructions within are true and accurate.   This note was written by Keith Gonzalez on 01/17/2024 acting as scribe for Dr. Stephan ORR I, Dr. Stephan Salvador, have read and attest that all the information, medical decision making and discharge instructions within are true and accurate.

## 2024-01-17 NOTE — DISCUSSION/SUMMARY
[de-identified] : Patient is s/p a left revision TKR. She still has recurrent effusions that may be due to impinging soft tissue. I reviewed her x-ray and MRI films with her as well as the lab work-up for infection which was negative. I reviewed both nonoperative and operative treatment plans. The nonoperative plan is to wear a knee sleeve, use anti-inflammatory medication prn, and continue activities as tolerated. The non-operative plan would be an open debridement with a lysis of adhesion and a tibial component exchange. The risks, benefits, convalescence and alternatives were reviewed. Numerous questions were asked and answered. She finds her knee to be a functional disability and may consider surgery once she has fully recovered from her neck surgery.   Patient can continue home exercises and activities as tolerated. All questions were answered, understanding verbalized. Patient is in agreement with plan of treatment.  Patient may follow up in 3 to 4 months at which time if her knee is still symptomatic, we may further discuss and/or schedule surgery.

## 2024-01-17 NOTE — PHYSICAL EXAM
[de-identified] : General appearance: well-nourished and hydrated, pleasant, alert and oriented x 3, cooperative. HEENT: Normocephalic, EOM intact, Nasal septum midline, Oral cavity clear, External auditory canal clear. Cardiovascular: no apparent abnormalities, no lower leg edema, no varicosities, pedal pulses are palpable. Lymphatics Lymph nodes: none palpated, Lymphedema: not present. Neurologic: sensation is normal, no muscle weakness in upper or lower extremities, patella tendon reflexes intact. Dermatologic no apparent skin lesions, moist, warm, no rash. Spine: cervical spine appears normal and moves freely, thoracic spine appears normal and moves freely, lumbosacral spine appears normal and moves freely. Gait: nonantalgic.  Left Knee Inspection: moderate effusion  Wounds: healed midline incision Alignment: normal. Palpation: lateral tenderness on palpation. ROM: Active (in degrees): 0-130 with pain and crepitus. Ligamentous laxity (neg): negative ant. drawer test, negative post. drawer test, stable to varus stress test, stable to valgus stress test, Patellofemoral Alignment Test: Q angle-, normal. Muscle Test: good quad strength. Leg examination: calf is soft and non-tender.  [de-identified] : Left knee x-ray, AP, lateral, merchant view taken at the office today demonstrates a revision total knee replacement in satisfactory position and alignment. No evidence of loosening. The patella sits in a central position.  Right knee x-ray merchant view taken at the office today demonstrates joint space maintained and a well centered patella.

## 2024-01-17 NOTE — CONSULT LETTER
[Dear  ___] : Dear  [unfilled], [Consult Letter:] : I had the pleasure of evaluating your patient, [unfilled]. [Please see my note below.] : Please see my note below. [Consult Closing:] : Thank you very much for allowing me to participate in the care of this patient.  If you have any questions, please do not hesitate to contact me. [Sincerely,] : Sincerely, [FreeTextEntry2] : BONY LOOMIS

## 2024-01-23 NOTE — ASU PREOP CHECKLIST - NSSDAENDDT_GEN_ALL_CORE
Writer supervised Raisa Richter, [facility] [employer], and am in agreement with assessment and disposition.      Behavioral Health Screening    Diagnosis: Unspecified Schizophrenia Spectrum and Other Psychotic D/O    Time Spent: 60 minutes    Crisis presentation: Pt, 24 y/o male, presented to ED via [employer] Officer Ney (*[id number], BT [id number]) s/p pt called 911 to report \"kids in my apartment\". Per [employer], pt's apartment was disheveled, with couch cushions \"ripped apart\", but pt was alone in apartment. Per mother, parents called 911 for a wellness check today s/p pt hung up during a phone call with them, and \"disconnected his phone\". Upon arrival to ED, pt presents as hyperverbal, perseverative on \"seeing a child trafficking ring in my apartment, and disorganized. Pt appears to be responding to internal stimuli, and exhibits mood lability. Pt appears unable to care for basic needs d/t severity of sx, and requires immediate i/p psychiatric admission.     Treatment History: Pt reports dx/o MDD and OCD. Mother reports that pt has previously been dx with Bipolar D/O. Pt denies any hx/o i/p psychiatric admissions. Pt reports that he is linked with OP psychiatrist, but notes that \"I don't remember the name\". Pt states that he is compliant with prescribed Lexapro and Wellbutrin. Pt also endorses Ketamine infusions for treatment of depression at [facility] [place], and states that he has been receiving those monthly for \"6 months\", with last infusion on 1/22. Pt states that he is linked with OP therapist, and has seen her 2x, but states that he \"can't remember her name either\".     Social History: Pt reports that he is from [place], [place], but states that he has been living in [place] since 2020 and lives alone in his apartment. Pt reported his social support consists of his fraternity and previous partners. Mother reports that pt moved to [place] for college at [employer] in 2016, and moved to [place], [place]  for work in 2020. 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 Pt states that he is studying to take the [employer], with his test scheduled in February 2024. Pt denies any hx/o or current legal system involvement. Pt presents as his own guardian.     Chemical Dependency History and Current Use: Pt denies current EtOH or drug use. Pt reported taking 20mg of Adderall, 4 days ago to help \"study for exam\". Mother reported pt being \"over-medicated\" with Xanax in college but denied that pt has any known hx/o substance abuse tx. Pt denies any hx/o CD treatment or w/d sx. Pt BAL=0, UDS positive for cannabinoids and amphetamines.          Past and Current Psychotic and Manic Symptoms: Pt presented to ED s/p he called 911 to report \"kids in my apartment\". Per [employer], pt's apartment was disheveled, with couch cushions \"ripped apart\", but pt was alone in apartment. Per mother, parents called 911 for a wellness check today s/p pt hung up during a phone call with them, and \"disconnected his phone\". Upon arrival to ED, pt presents as hyperverbal, perseverative on \"seeing a child trafficking ring in my apartment, and disorganized. Pt states that he \"saw the man holding these kids hiding in my couch cushions\", leading pt to \"try and rip the couch apart so he wouldn't suffocate\". Pt appears to be responding to internal stimuli, observed talking to himself and accusing public safety staff of talking about him.     Risk of Harm to Self: Pt denies any hx/o or current SI, denies hx/o suicide attempts or self harming bx. Pt denies any family hx/o suicide. Mother denied any recent SI, however reports pt has a hx/o SI in fall 2023 when pt endorsed SI to father. Mother reports SI resolved when pt met with therapist. Pt denies access to a firearm.     Risk of Self-Harm: Low    Past and Current Risk of Harm to Others: Pt denies any hx/o or current HI, denies hx/o violence or aggressive bx. Mother denies any hx/o aggression or violence. Pt  presents as calm and cooperative with ED staff.     Risk of Harm to Others: Low      Collateral Report: Writer spoke with pt's parents Kenzie ([phone]) and Ishmael ([phone]), who express significant concern d/t new onset of psychotic sx.  Mother reports that she called 911 for a wellness check this morning s/p pt called father to report \"kids in his apartment\" as he hung up and disconnected his phone. Mother described pt as \"disoriented and irrational,\" which was unusual. Mother reported onset of anxiety, depression, and somatic sx began in 2017 when pt was attending college at [employer]. 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 Mother reported pt is currently pursuing law school, and studying for the [employer]. Mother reports that pt began ketamine tx in October 2023 to alleviate anxiety of the LSAT. Mother reported pt undergoes ketamine tx once a month, with the most recent tx yesterday afternoon. Parents advocating for i/p admission at this time.    Plan for remediation of the crisis: Pt to be transferred to [address] for i/p psychiatric admission, Dr. Weller accepting. Pt signed voluntary admission paperwork.     Intake Assessment    Assessment Method  Assessment Method: In-person assessment    Intake Assessment Completed by:  Completed by:: Sveta Menjivar LCSW  Service Type ([facility] [place] [facility] ONLY): Crisis  Reason for Seeking Treatment  Patient stated reason for treatment: \"I saw kids being trafficked, and a man in my couch cushion\"  Reason for assessment: Psychosis  The Patient is Experiencing: An increase in acute symptomatology    Patient Brought to Hospital By  Pt Brought to Hospital By:: Police/secured transport  Do You Identify as Male or Female?: Male    Provider Information  Psychiatrist: Yes  Date of Last Visit: 01/22/24  Clinic[facility]  Therapist: Yes  Name: \"I don't remember\"  Clinic: \"on [facility]\"  Most  Recent Inpatient/Partial Hospitalization/IOP  Most Recent Inpatient/Partial Hospitalization/IOP: None    Weapons  Do You Have Any Weapons or Firearms with You Today?: No  Do You Have Any Weapons or Firearms You Plan to Use to Harm Yourself or Others?: No    Violence Assessment  Any history of arrests or legal charges for serious crimes (robbery, sexual assault, assault battery, weapons charge, murder)?: No  Any recent or current thoughts/threats to harm or kill others?: No  Access to Means: No  Has there been a recent history of anger, outbursts, property destruction, or aggression?: No   Does patient have a plan to act in a violent manner?: No    [place] Suicide Severity Rating Scale (C-SSRS)  1. Have you wished you were dead or wished you could go to sleep and not wake up? (past month): No  2. Have you actually had any thoughts of killing yourself? (past month): No  6. Have you ever done anything, started to do anything, or prepared to do anything to end your life? (lifetime): No  Suicide Evaluation: Negative Screen - White    Contributing Factors to Suicide Risk  Current/Past Psychiatric History: Anxiety, Depression  Key Suicidal Symptoms: Anxious, Impulsive and/or aggressive tendencies  Precipitants/Stressors/Interpersonal Concerns: Social isolation, Substance abuse  Protective Factors/Reason for Living: Future orientation, Positive therapeutic relationships    Family Mental Health History  Family History of Suicide: No  Family History of Suicide Attempts: No  Family History of Mental Health Diagnosis: No  Family Member with Psychiatric Disorder Requiring Hospitalization: No    Legal Status  Legal Issues: None    Interpersonal Safety  Interpersonal Safety Screening: Complete assessment (alone or age 12 years or less with parents)  How often does anyone, including family and friends, physically hurt you? : Never  How often does anyone, including family and friends, insult or talk down to you?  : Never  How often  does anyone, including family and friends, threaten you with harm? : Never  How often does anyone, including family and friends, scream or curse at you? : Never    Trauma Assessment  In your life, have you ever had any experience that was so frightening, horrible, or upsetting that you thought about it in the past month?: No    Sleep Analysis  Sleep Report: Poor, Restlessness  What Shift Do You Work?: 1st shift    Nutrition Assessment  Within the past 12 months, the food you bought just didn't last and you didn't have money to get more. : Never true  Within the past 12 months, you worried that your food would run out before you got money to buy more.  : Never true  Are You Drinking Fluids Daily?: Yes  Any Changes in Your Appetite?: No  Dental Problems Impairing Ability to Eat?: No  Weight Gain of 10 or More Pounds in the Last Month: No  Weight Loss of 10 or More Pounds in the Last Month: No  Do you have a history of disordered eating?: No    MENTAL STATUS  Level of Consciousness (calc): Alert  Orientation: Oriented to person, Oriented to place, Oriented to time  Attention (calc): Reduced ability to maintain attention to stimuli, Reduced ability to appropriately shift attention to new stimuli  Memory: Intact  Perceptual Misinterpretations/Hallucinations: Auditory, Visual  Speech: Rambling speech  Motor Behavior-Agitation (calc): Restless  Behavior: Poor eye contact  Affect: Labile  Mood : Happy    Social Assessment  What is your living situation today? : I have a steady place to live  Type of Residence: Apartment  In the past 12 months has the electric, gas, oil, or water company threatened to shut off services in your home?: No  Do you have problems with any of the following? : None of the above  Living Arrangements: Alone  Support Systems: Family members, Friends  In the past year, have you or any family members you live with been unable to get any of the following when it was really needed? Check all that apply.:  None  How often do you see or talk to people that you care about and feel close to? (For example: talking to friends on the phone, visiting friends or family, going to [employer] or club meetings): 5 or more times a week  In the past 12 months, has lack of reliable transportation kept you from medical appointments, meetings, work or from getting things needed for daily living? : No  Employment Status: Employed  Are you a ?: No   Status: None    Assessment Summary  Assessment Summary: Writer reviewed findings with Dr. Chowdhury, ED Attending, and [facility] Barrow, and all are in agreement that pt requires immediate i/p admission for safety and stabilization of sx.    Reasons for Level of Treatment  Reason(s) for Inpatient Treatment: Impaired reality testing with disordered behavior, and potential for causing danger to self/others/property    Primary Diagnosis  State ICD 10 Diagnosis: Unspecified schizophrenia spectrum and other psychotic disorder    Weapons Intervention  Do You Have Any Weapons or Firearms at Home?: No    Intake Assessment Completed  Intake Assessment Completed: Yes  Total Face to Face Time: 60 min         Final Disposition  Disposition Level of Care: IP     17-Jan-2023 13:17

## 2024-01-25 ENCOUNTER — APPOINTMENT (OUTPATIENT)
Dept: ORTHOPEDIC SURGERY | Facility: CLINIC | Age: 64
End: 2024-01-25
Payer: MEDICARE

## 2024-01-25 PROCEDURE — 72040 X-RAY EXAM NECK SPINE 2-3 VW: CPT

## 2024-01-25 PROCEDURE — 99214 OFFICE O/P EST MOD 30 MIN: CPT | Mod: 24

## 2024-01-26 ENCOUNTER — RX RENEWAL (OUTPATIENT)
Age: 64
End: 2024-01-26

## 2024-02-02 ENCOUNTER — APPOINTMENT (OUTPATIENT)
Dept: PULMONOLOGY | Facility: CLINIC | Age: 64
End: 2024-02-02
Payer: MEDICARE

## 2024-02-02 VITALS — BODY MASS INDEX: 27.42 KG/M2 | WEIGHT: 149 LBS | HEIGHT: 62 IN

## 2024-02-02 DIAGNOSIS — R09.82 POSTNASAL DRIP: ICD-10-CM

## 2024-02-02 PROCEDURE — 99214 OFFICE O/P EST MOD 30 MIN: CPT

## 2024-02-02 NOTE — HISTORY OF PRESENT ILLNESS
[Home] : at home, [unfilled] , at the time of the visit. [Medical Office: ([facility])___] : at the medical office located in  [Verbal consent obtained from patient] : the patient, [unfilled] [FreeTextEntry1] : Ms. Dickerson is a 63 year old female with a history of allergic rhinitis, asthma, atypical chest pain, GERD, CRYSTAL, and SOB presenting to the office today via video call for a follow-up pulmonary evaluation. His chief complaint is  -she notes s/p 4th bout of COVID-19 10/2023. She took Tylenol Cold and Flu, which prevented her Sx from dropping into her chest. Her Sx were sore throat and sinus congestion -she notes s/p 3 discs replaced in her neck. She's in PT -she notes residual knee swelling, for which she'll see Dr. Salvador -she notes mild globus sensation at this time -she denies chest congestion -she feeling fatigued despite using her DD. She's also felt dizzy -she notes she's been in the hospital frequently due to her uncle's health issues -she notes taking vitamin B12, vitamin D, and a prenatal vitamin as per her gynecologist -she notes having trouble losing weight -she notes she was having severe acid reflux -she's on Lamotrigine 100 mg for her anxiety  -she denies any headaches, nausea, emesis, fever, chills, sweats, chest pain, chest pressure, coughing, wheezing, palpitations, diarrhea, constipation, dysphagia, myalgias, leg swelling, itchy eyes, itchy ears, or sour taste in the mouth.

## 2024-02-02 NOTE — ADDENDUM
[FreeTextEntry1] : Documented by Carlyn De Dios acting as a scribe for Dr. Mathew Rao on 02/02/2024. All medical record entries made by the Scribe were at my, Dr. Mathew Rao's, direction and personally dictated by me on 02/02/2024. I have reviewed the chart and agree that the record accurately reflects my personal performance of the history, physical exam, assessment and plan. I have also personally directed, reviewed, and agree with the discharge instructions.

## 2024-02-02 NOTE — ASSESSMENT
[FreeTextEntry1] : Ms. Dickerson is a 63 year old female with vertigo, fibromyalgia, asthma, allergies, GERD, COVID-19 x4 (last 10/2023), OSAS and mild SOB (non-compliant) presenting to the office today via video call for a follow-up pulmonary evaluation. Her number one issue is orthopedic (still- knee/neck), "fatigue"  problem 1: asthma- controlled -continue to use Levalbuterol 0.63% by the nebulizer q6H -continue to use Accolate 20 mg BID -continue Trelegy 1 puff QD -continue ProAir 2 puffs Q6H, pre-exercise -Asthma is believed to be caused by inherited (genetic) and environmental factor, but its exact cause is unknown. Asthma may be triggered by allergens, lung infections, or irritants in the air. Asthma triggers are different for each person -Inhaler technique reviewed as well as oral hygiene techniques reviewed with patient. Avoidance of cold air, extremes of temperature, rescue inhaler should be used before exercise. Order of medication reviewed with patient. Recommended use of a cool mist humidifier in the bedroom.  problem 2: allergic rhinitis - controlled -continue Xhance 1 sniff BID -continue to use nasal saline/Xlear; Navage nasal spray -can continue OTC antihistamine PRN -continue to use Astelin (.15) 1 sniff/nostril BID -Environmental measures for allergies were encouraged including mattress and pillow cover, air purifier, and environmental controls.  problem 3: acute/chronic sinusitis -Nasal Polyps -recommended CT of sinuses w/o contrast -Consider Dupixent. -Dupixent is a prescription medicine used with other asthma medicines for the maintenance treatment of moderate-to-severe asthma in people aged 12 years and older whose asthma is not controlled with their current asthma medicines. Dupixent helps prevent severe asthma attacks (exacerbations) and can improve your breathing. Dupixent may also help reduce the amount of oral corticosteroids you need while preventing severe asthma attacks and improving your breathing. Dupixent is not used to treat sudden breathing problems. Risks and side effects of Dupixent were discussed and reviewed with patient.  problem 4: CRYSTAL (on Rx) -recommended Epsom salt bath QHS -Dental Device ([place]) -recommended positional sleep -recommended to use "Chin Strap" -recommended to use extended release melatonin -Sleep apnea is associated with adverse clinical consequences which an affect most organ systems. Cardiovascular disease risk includes arrhythmias, atrial fibrillation, hypertension, coronary artery disease, and stroke. Metabolic disorders include diabetes type 2, non-alcoholic fatty liver disease. Mood disorder especially depression; and cognitive decline especially in the elderly. Associations with chronic reflux/Brothers's esophagus some but not all inclusive. -Reasons to assess this include arousal consistent with hypopnea; respiratory events most prominent in REM sleep or supine position; therefore sleep staging and body position are important for accurate diagnosis and estimation of AHI. -Treatment options discussed including CPAP/BiPAP machine, oral appliance, ProVent therapy, Oxy-Aid by [employer], new technologies, or positional sleep.Recommended use of the CPAP machine for moderate (AHI >15), moderate to severe (AHI 15-30) and severe patients (AHI > 30). Recommended weight loss which can reduce AHI especially in weight loss of greater than 5% of BMI. Positional sleep is recommended in those with low AHI, low-moderate BMI, and younger age. For severe sleep apnea, the hypoglossal nerve stimulator was recommended as well.  problem 5: GERD ([place]) -continue AcipHex 20 mg before breakfast -recommended Reflux Gourmet  -Rule of 2s: avoid eating too much, eating too late, eating too spicy, eating two hours before bed -Things to avoid including overeating, spicy foods, tight clothing, eating within three hours of bed, this list is not all inclusive -For treatment of reflux, possible options discussed including diet control, H2 blockers, PPIs, as well as coating motility agents discussed as treatment options. Timing of meals and proximity of last meal to sleep were discussed. If symptoms persist, a formal gastrointestinal evaluation is needed.  problem 6: atypical chest pain (resolved) -felt to be related to reflux and anxiety  problem 7: anxiety -recommended to read: "The Gift of Maybe," by Rebeca Aguilar -recommended supplemental Mg  problem 8: overweight -currently working with BODI -recommended to try WW. -recommended Berberine OTC supplement for visceral fat loss  -Weight loss, exercise, and diet control were discussed and are highly encouraged. Treatment options were given such as, aqua therapy, and contacting a nutritionist. Recommended to use the elliptical, stationary bike, less use of treadmill. Mindful eating was explained to the patient Obesity is associated with worsening asthma, shortness of breath, and potential for cardiac disease, diabetes, and other underlying medical conditions.  Problem 9: Health Maintenance/COVID19 Precautions: (COVID-19 x2 12/2021, 8/2022, 10/2023) -s/p COVID-19 vaccine x3 12/2021 - Clean your hands often. Wash your hands often with soap and water for at least 20 seconds, especially after blowing your nose, coughing, or sneezing, or having been in a public place. - If soap and water are not available, use a hand  that contains at least 60% alcohol. - To the extent possible, avoid touching high-touch surfaces in public places - elevator buttons, door handles, handrails, handshaking with people, etc. Use a tissue or your sleeve to cover your hand or finger if you must touch something. - Wash your hands after touching surfaces in public places. - Avoid touching your face, nose, eyes, etc. - Clean and disinfect your home to remove germs: practice routine cleaning of frequently touched surfaces (for example: tables, doorknobs, light switches, handles, desks, toilets, faucets, sinks & cell phones) - Avoid crowds, especially in poorly ventilated spaces. Your risk of exposure to respiratory viruses like COVID-19 may increase in crowded, closed-in settings with little air circulation if there are people in the crowd who are sick. All patients are recommended to practice social distancing and stay at least 6 feet away from others. - Avoid all non-essential travel including plane trips, and especially avoid embarking on cruise ships. -If COVID-19 is spreading in your community, take extra measures to put distance between yourself and other people to further reduce your risk of being exposed to this new virus. -Stay home as much as possible. - Consider ways of getting food brought to your house through family, social, or commercial networks -Be aware that the virus has been known to live in the air up to 3 hours post exposure, cardboard up to 24 hours post exposure, copper up to 4 hours post exposure, steel and plastic up to 2-3 days post exposure. Risk of transmission from these surfaces are affected by many variables.  problem 10: health maintenance -recommended topricin cream -recommended SPM and quercetin -s/p yearly flu shot 2023 -recommended strep pneumonia vaccines: Prevnar-13 vaccine, followed by Pneumo vaccine 23 one year following -recommended early intervention for URIs -recommended regular osteoporosis evaluations -recommended early dermatological evaluations -recommended after the age of 50 to the age of 70, colonoscopy every 5 years  F/P in 6 months SPI and NIOX. She is encouraged to call with any changes, concerns, or questions.

## 2024-02-05 ENCOUNTER — TRANSCRIPTION ENCOUNTER (OUTPATIENT)
Age: 64
End: 2024-02-05

## 2024-02-09 ENCOUNTER — RX RENEWAL (OUTPATIENT)
Age: 64
End: 2024-02-09

## 2024-03-11 ENCOUNTER — RX RENEWAL (OUTPATIENT)
Age: 64
End: 2024-03-11

## 2024-04-11 ENCOUNTER — APPOINTMENT (OUTPATIENT)
Dept: ORTHOPEDIC SURGERY | Facility: CLINIC | Age: 64
End: 2024-04-11
Payer: MEDICARE

## 2024-04-11 DIAGNOSIS — Z98.890 OTHER SPECIFIED POSTPROCEDURAL STATES: ICD-10-CM

## 2024-04-11 PROCEDURE — 99213 OFFICE O/P EST LOW 20 MIN: CPT

## 2024-04-11 NOTE — IMAGING
[de-identified] :  C Spine Inspection: No defects or deformities Palpation: No tenderness or spasms in trapezial, rhomboid or paracervical musculature ROM: Full with mild stiffness Strength: 5/5 b/l deltoid, biceps, triceps, wrist flexors, wrist extensors, abductors Neuro: Sensation I LT Negative Vazquez's b/l Negative Spurling

## 2024-04-11 NOTE — HISTORY OF PRESENT ILLNESS
[de-identified] : DOS: C4-7 CDR on 11/03/23 4/11/24: Follow Up C Spine. Doing well; occasional tightness. Noticed hands getting weak.   01/25/24: PO#3. 7 weeks s/p C4-7 CDR. Patient has started PT and has been doing HEP which she reports has been helpful in resolved left sided neck stiffness. She reports improved ROM. Patient reports she is experiencing BUE numbness down arms when at rest. No longer taking tizanidine at this time. Takes Motrin prn. Incision remains c/d, no f/chilles/drainage.   12/14/2023 Here for POV#2. 5 weeks s/p CDR C4-7. Has been feeling pain/stiffness for the past couple weeks; worse on left side. Pain/numbness down b/l arms. Pain when turning head left to right and vice versa. Taking diclofenac for knee pain, experiencing AE.   11/10/23: first pov s/p CDR C4-7 11/3/23; doing well; the severe pain into the RUE into the pinky is improving but still there; stopped the pain meds; taking ibuprofen/tylenol prn. no f/chills/drainage

## 2024-04-11 NOTE — ASSESSMENT
[FreeTextEntry1] : PO#3. 5 months s/p C4-7 CDR. Doing well. Remains with mild stiffness with ROM. Developing b/l hand weakness, no numbness, postop xr's w/o hardware malfunction or adjacent segment disease.   - Re eval symptoms in 6 months if they persist, at that time would recommend consulting with Rheum.

## 2024-04-11 NOTE — PHYSICAL EXAM
[Implants in position] : Implants in position [No loosening of hardware] : No loosening of hardware [FreeTextEntry1] : C4-7 CDR.

## 2024-04-17 ENCOUNTER — APPOINTMENT (OUTPATIENT)
Dept: ORTHOPEDIC SURGERY | Facility: CLINIC | Age: 64
End: 2024-04-17
Payer: MEDICARE

## 2024-04-17 VITALS — BODY MASS INDEX: 27.05 KG/M2 | WEIGHT: 147 LBS | HEIGHT: 62 IN

## 2024-04-17 DIAGNOSIS — M76.892 OTHER SPECIFIED ENTHESOPATHIES OF LEFT LOWER LIMB, EXCLUDING FOOT: ICD-10-CM

## 2024-04-17 DIAGNOSIS — Z96.659 OTHER MECHANICAL COMPLICATION OF OTHER INTERNAL JOINT PROSTHESIS, INITIAL ENCOUNTER: ICD-10-CM

## 2024-04-17 DIAGNOSIS — Z96.652 PRESENCE OF LEFT ARTIFICIAL KNEE JOINT: ICD-10-CM

## 2024-04-17 DIAGNOSIS — M17.12 UNILATERAL PRIMARY OSTEOARTHRITIS, LEFT KNEE: ICD-10-CM

## 2024-04-17 DIAGNOSIS — T84.098A OTHER MECHANICAL COMPLICATION OF OTHER INTERNAL JOINT PROSTHESIS, INITIAL ENCOUNTER: ICD-10-CM

## 2024-04-17 PROCEDURE — G2211 COMPLEX E/M VISIT ADD ON: CPT

## 2024-04-17 PROCEDURE — 73562 X-RAY EXAM OF KNEE 3: CPT | Mod: LT

## 2024-04-17 PROCEDURE — 99213 OFFICE O/P EST LOW 20 MIN: CPT

## 2024-04-21 NOTE — PHYSICAL EXAM
[de-identified] : General appearance: well-nourished and hydrated, pleasant, alert and oriented x 3, cooperative. HEENT: Normocephalic, EOM intact, Nasal septum midline, Oral cavity clear, External auditory canal clear. Cardiovascular: no apparent abnormalities, no lower leg edema, no varicosities, pedal pulses are palpable. Lymphatics Lymph nodes: none palpated, Lymphedema: not present. Neurologic: sensation is normal, no muscle weakness in upper or lower extremities, patella tendon reflexes intact. Dermatologic no apparent skin lesions, moist, warm, no rash. Spine: cervical spine appears normal and moves freely, thoracic spine appears normal and moves freely, lumbosacral spine appears normal and moves freely. Gait: nonantalgic.  Left Knee Inspection: trace effusion  Wounds: healed midline incision Alignment: normal. Palpation: tenderness of posterior lateral corner on palpation. ROM: Active (in degrees): 0-130 stable on full extension Ligamentous laxity (neg): negative ant. drawer test, negative post. drawer test, stable to varus stress test, stable to valgus stress test, few millimeters of medial later laxity, AP translation of 5 mm   Patellofemoral Alignment Test: Q angle-, normal. Muscle Test: good quad strength. Leg examination: calf is soft and non-tender.  [de-identified] : Left knee x-ray, AP, lateral, merchant view taken at the office today demonstrates a revision total knee replacement in satisfactory position and alignment. No evidence of loosening. The patella sits in a central position.  Right knee x-ray merchant view taken at the office today demonstrates joint space maintained and a well centered patella.

## 2024-04-21 NOTE — DISCUSSION/SUMMARY
[de-identified] : Patient is doing well following their s/p Left revision TKR. She still has symptoms of subtle flexion instability and popliteal tendonitis of posterior and lateral corner. We will continue a non-operative treatment at this time. She has been wearing a knee sleeve and working with motion and strength with . I reviewed x-rays with them and compared to prior films. I have reassured them that their implants are functioning well. All questions answered, understanding verbalized. She is encouraged to continue to stay active with a home exercise program and can continue activities as tolerated. If her symptoms become a persistent problem, then she will need a surgery with tibial component change but she wants to avoid surgery.    I will see them back in 6 months with x-rays.

## 2024-04-21 NOTE — ADDENDUM
[FreeTextEntry1] : This note was written by Kemi Dos Santos on 04/17/2024 acting as scribe for Dr. Stephan Salvador M.D.   I, Dr. Stephan Salvador, have read and attest that all the information, medical decision making and discharge instructions within are true and accurate.

## 2024-04-21 NOTE — HISTORY OF PRESENT ILLNESS
[de-identified] : RASHARD GALLEGOS  63 year old female presents for evaluation of left revision TKR done January 2023. She still has recurrent effusion which she states is activity related. She has been icing the knee and taking Motrin 800 mg as needed. She has been doing her own home exercises with a  at this time.

## 2024-04-30 ENCOUNTER — RX RENEWAL (OUTPATIENT)
Age: 64
End: 2024-04-30

## 2024-04-30 RX ORDER — DICLOFENAC SODIUM 75 MG/1
75 TABLET, DELAYED RELEASE ORAL
Qty: 60 | Refills: 0 | Status: ACTIVE | COMMUNITY
Start: 2023-11-20 | End: 1900-01-01

## 2024-05-08 ENCOUNTER — APPOINTMENT (OUTPATIENT)
Dept: PAIN MANAGEMENT | Facility: CLINIC | Age: 64
End: 2024-05-08
Payer: MEDICARE

## 2024-05-08 VITALS — BODY MASS INDEX: 27.42 KG/M2 | WEIGHT: 149 LBS | HEIGHT: 62 IN

## 2024-05-08 PROCEDURE — 99213 OFFICE O/P EST LOW 20 MIN: CPT

## 2024-05-08 RX ORDER — GABAPENTIN 100 MG/1
100 CAPSULE ORAL
Qty: 90 | Refills: 2 | Status: ACTIVE | COMMUNITY
Start: 2024-05-08 | End: 1900-01-01

## 2024-05-08 RX ORDER — TIRZEPATIDE 5 MG/.5ML
5 INJECTION, SOLUTION SUBCUTANEOUS
Refills: 0 | Status: ACTIVE | COMMUNITY

## 2024-05-08 NOTE — PHYSICAL EXAM
[Rotation to left] : rotation to left [Rotation to right] : rotation to right [de-identified] : left lateral rotation 75 degrees [TWNoteComboBox6] : right lateral rotation 75 degrees [] : no palpable masses

## 2024-05-08 NOTE — HISTORY OF PRESENT ILLNESS
[Neck] : neck [Lower back] : lower back [3] : 3 [0] : 0 [Dull/Aching] : dull/aching [Tingling] : tingling [Intermittent] : intermittent [Sleep] : sleep [Rest] : rest [Meds] : meds [Ice] : ice [Heat] : heat [Injection therapy] : injection therapy [Standing] : standing [Walking] : walking [Bending forward] : bending forward [Exercising] : exercising [Retired] : Work status: retired [10] : 10 [5] : 5 [Throbbing] : throbbing [Constant] : constant [Leisure] : leisure [FreeTextEntry1] : 05/08/2024: Follow up today.  Had   7/26/23- MRI cervical spine (7/5/23) 1. Reversal of the cervical lordosis and multilevel spondylolisthesis. 2. Encroachment upon the cord and bilateral exiting nerve roots at C4-C5, impingement upon right greater than  left exiting C7 nerve roots with central stenosis and broad-based posterior disc herniation at C6-C7, and  encroachment upon bilateral exiting C8 nerve roots at C7-T1. 3. Multilevel degenerative endplate changes and multilevel uncovertebral hypertrophy without acute fracture or  cord compression.  Pain in the neck with radiation to the shoulders.   6/28/23- fu for Left SI joint injection on 5/5.  C/o pain and numbness in left arm.  Would recommend a repeat MRI.    11/11/2022: follow up today after right cervical RFA on 10/24/22. Had 80% relief so far. Now pain is in left SI joint will repeat injection.    08/18/2022: follow up today after left cervical RFA on 7/22/22.  Had 50% so far.  Has not had as much improvement as in past.  Also complains of pain in thoracic area.  Will give TPI  7/15/22-  L LS MBB without relief;  c/o pain in lower back, mainly L sided with cramping in L ankle ; consider caudal michael prior L upper cerv RFA with >70% relief sustained several months with improved rom and adls, will repeat. Has been on Topamax in the past with good relief.   3/25/22: follow up today to MRI lumbar spine: (3/17/22) Impression: 1. Mild central stenosis with bilateral exiting L3 nerve root impingement at L3-L4, scoliosis, and postoperative changes without recurrent disc herniation at L4-L5. 2. Multilevel degenerative disc disease and posterior protrusion at T11-T12 with hemangioma at L1 and multilevel foraminal narrowing. 3. No acute fracture, discitis, or enhancing postoperative fluid collection on postcontrast imaging. 4. Clinical correlation regarding prior surgical history is recommended. has pain in the right foot when she puts weight on it. Thought initially it was a neuroma. however ruled out with imaging.  Had fusion L4/5 in September 2020. I would recommend EMG/NCV> She would like to repeat cervical RFA as well.  3/9/22: follow up toady. Pain in the left trap. yesterday she bent down and she felt a pop. now when she bends she feels like her back is going to give out. difficulty with change of position. Pain in the left lower back. Pain radiates down the posterior left leg.  8/13/21: follow up today after left SIJ injection on 7/23/21. She reports great relief initially and pain recurred about 3-4 days later. takes flexeril PRN (makes her sleepy) Pain under the left shoulder blade. Had CHERYL's in the past with relief. Consider left SIJ RFA.  7/23/21: follow up today after right cervical RFA on 7/8/21. patient starting to feel better. Since last visit had CT scan and met with Dr. Hunter Chow who recommended a Left SIJ injection.  2/26/21: follow up today after right cervical RFA on 11/13/21. Had 60% improvement but now the left side of neck is hurting again. MRI 10/20- S/P L4-L5 laminectomy . Hardware intact Will give TPI today and schedule repeat RFA on left. SCS info given  8.14.20: follow up today after left cervical RFA. Pt with some improvement. I would recommend she give it another 4 weeks. I would recommend right cervical RFA as it has helped in the past.  7.3.20 - follow up today after left L5/S1 and S1 and CHERYL. The neck improved about 50%.Has left lower back pain. sitting aggravated her left lower back. numbness in the legs. last left lumbar RFA was in September. [] : no [FreeTextEntry6] : numbness  [FreeTextEntry7] : to the left side, right leg [FreeTextEntry9] : Motrin  [de-identified] : Driving [TWNoteComboBox1] : 50%

## 2024-05-09 ENCOUNTER — TRANSCRIPTION ENCOUNTER (OUTPATIENT)
Age: 64
End: 2024-05-09

## 2024-05-09 RX ORDER — DICLOFENAC SODIUM 1% 10 MG/G
1 GEL TOPICAL 3 TIMES DAILY
Qty: 30 | Refills: 0 | Status: ACTIVE | COMMUNITY
Start: 2022-06-02 | End: 1900-01-01

## 2024-05-10 ENCOUNTER — TRANSCRIPTION ENCOUNTER (OUTPATIENT)
Age: 64
End: 2024-05-10

## 2024-05-13 NOTE — H&P PST ADULT - NS PRO FEM REPRO PAP RESULTS
normal [Shuffling] : shuffling [UE] : Sensory: Intact in bilateral upper extremities [Normal RUE] : Right Upper Extremity: No scars, rashes, lesions, ulcers, skin intact [Normal LUE] : Left Upper Extremity: No scars, rashes, lesions, ulcers, skin intact [Normal RLE] : Right Lower Extremity: No scars, rashes, lesions, ulcers, skin intact [Normal LLE] : Left Lower Extremity: No scars, rashes, lesions, ulcers, skin intact [Normal Touch] : sensation intact for touch [Normal] : Alert and in no acute distress [de-identified] : Thoracic and lumbar spine No edema, ecchymoses, erythema, deformity. On firm palpation through the cervical, thoracic and lumbar spine there is no significant pain. He can bend forward reaching below his knees without any significant pain or difficulty. Gait is at his baseline which is with some weakness from the CVA in the right leg and shuffling  [de-identified] : Difficulty communicating because of history of CVA, dementia and seizure disorder but can answer yes and no questions [de-identified] :   X-rays 4/15/24 of thoracic and lumbar spine for low back injury AP and lateral views showed Mild degenerative changes.  There appears to be mild compression of T12 age-indeterminate which may be about 20%

## 2024-05-15 ENCOUNTER — APPOINTMENT (OUTPATIENT)
Dept: PAIN MANAGEMENT | Facility: CLINIC | Age: 64
End: 2024-05-15
Payer: MEDICARE

## 2024-05-15 ENCOUNTER — APPOINTMENT (OUTPATIENT)
Dept: MRI IMAGING | Facility: CLINIC | Age: 64
End: 2024-05-15

## 2024-05-15 VITALS — WEIGHT: 147 LBS | BODY MASS INDEX: 27.05 KG/M2 | HEIGHT: 62 IN

## 2024-05-15 DIAGNOSIS — M54.16 RADICULOPATHY, LUMBAR REGION: ICD-10-CM

## 2024-05-15 DIAGNOSIS — M48.061 SPINAL STENOSIS, LUMBAR REGION WITHOUT NEUROGENIC CLAUDICATION: ICD-10-CM

## 2024-05-15 PROCEDURE — 99214 OFFICE O/P EST MOD 30 MIN: CPT

## 2024-05-15 RX ORDER — LIDOCAINE 5 G/100G
5 OINTMENT TOPICAL
Qty: 3 | Refills: 2 | Status: ACTIVE | COMMUNITY
Start: 2024-05-15 | End: 1900-01-01

## 2024-05-16 NOTE — HISTORY OF PRESENT ILLNESS
[Neck] : neck [Lower back] : lower back [10] : 10 [5] : 5 [Dull/Aching] : dull/aching [Throbbing] : throbbing [Tingling] : tingling [Constant] : constant [Leisure] : leisure [Sleep] : sleep [Rest] : rest [Meds] : meds [Ice] : ice [Heat] : heat [Injection therapy] : injection therapy [Standing] : standing [Walking] : walking [Bending forward] : bending forward [Exercising] : exercising [Retired] : Work status: retired [TWNoteComboBox1] : 50% [FreeTextEntry1] : 5/15/24- fu for MRI [facility]: - 5/10/24: Postsurgical lumbar spine with progressive chronic degenerative changes at L1-2 and L3-4 levels as described.  compared to MRI from 10/16/202. On personal review, L3/4 disc getting worse, associated with stenosis.   05/08/2024: Follow up today.  Has pain now in low back.  Does not radiate.  No numbness or tingling.   Will get new MRI of lumbar spine- Last was 2020 7/26/23- MRI cervical spine (7/5/23) 1. Reversal of the cervical lordosis and multilevel spondylolisthesis. 2. Encroachment upon the cord and bilateral exiting nerve roots at C4-C5, impingement upon right greater than  left exiting C7 nerve roots with central stenosis and broad-based posterior disc herniation at C6-C7, and  encroachment upon bilateral exiting C8 nerve roots at C7-T1. 3. Multilevel degenerative endplate changes and multilevel uncovertebral hypertrophy without acute fracture or  cord compression.  Pain in the neck with radiation to the shoulders.   6/28/23- fu for Left SI joint injection on 5/5.  C/o pain and numbness in left arm.  Would recommend a repeat MRI.    11/11/2022: follow up today after right cervical RFA on 10/24/22. Had 80% relief so far. Now pain is in left SI joint will repeat injection.    08/18/2022: follow up today after left cervical RFA on 7/22/22.  Had 50% so far.  Has not had as much improvement as in past.  Also complains of pain in thoracic area.  Will give TPI  7/15/22-  L LS MBB without relief;  c/o pain in lower back, mainly L sided with cramping in L ankle ; consider caudal michael prior L upper cerv RFA with >70% relief sustained several months with improved rom and adls, will repeat. Has been on Topamax in the past with good relief.   3/25/22: follow up today to MRI lumbar spine: (3/17/22) Impression: 1. Mild central stenosis with bilateral exiting L3 nerve root impingement at L3-L4, scoliosis, and postoperative changes without recurrent disc herniation at L4-L5. 2. Multilevel degenerative disc disease and posterior protrusion at T11-T12 with hemangioma at L1 and multilevel foraminal narrowing. 3. No acute fracture, discitis, or enhancing postoperative fluid collection on postcontrast imaging. 4. Clinical correlation regarding prior surgical history is recommended. has pain in the right foot when she puts weight on it. Thought initially it was a neuroma. however ruled out with imaging.  Had fusion L4/5 in September 2020. I would recommend EMG/NCV> She would like to repeat cervical RFA as well.  3/9/22: follow up toady. Pain in the left trap. yesterday she bent down and she felt a pop. now when she bends she feels like her back is going to give out. difficulty with change of position. Pain in the left lower back. Pain radiates down the posterior left leg.  8/13/21: follow up today after left SIJ injection on 7/23/21. She reports great relief initially and pain recurred about 3-4 days later. takes flexeril PRN (makes her sleepy) Pain under the left shoulder blade. Had CHERYL's in the past with relief. Consider left SIJ RFA.  7/23/21: follow up today after right cervical RFA on 7/8/21. patient starting to feel better. Since last visit had CT scan and met with Dr. Hunter Chow who recommended a Left SIJ injection.  2/26/21: follow up today after right cervical RFA on 11/13/21. Had 60% improvement but now the left side of neck is hurting again. MRI 10/20- S/P L4-L5 laminectomy . Hardware intact Will give TPI today and schedule repeat RFA on left. SCS info given  8.14.20: follow up today after left cervical RFA. Pt with some improvement. I would recommend she give it another 4 weeks. I would recommend right cervical RFA as it has helped in the past.  7.3.20 - follow up today after left L5/S1 and S1 and CHERYL. The neck improved about 50%.Has left lower back pain. sitting aggravated her left lower back. numbness in the legs. last left lumbar RFA was in September. [] : no [FreeTextEntry6] : numbness  [FreeTextEntry7] : to the left side, right leg [FreeTextEntry9] : Motrin  [de-identified] : Driving [de-identified] : MRI

## 2024-05-16 NOTE — ASSESSMENT
[FreeTextEntry1] : After discussing various treatment options with the patient including but not limited to oral medications, physical therapy, exercise, modalities as well as interventional spinal injections, we have decided with the following plan:  1) Intervention Injection Therapy: I personally reviewed the MRI/CT scan images and agree with the radiologist's report. The radiological findings were discussed with the patient. The risks, benefits, contents and alternatives to injection were explained in full to the patient. Risks outlined include but are not limited to infection,sepsis, bleeding, post-dural puncture headache, nerve damage, temporary increase in pain, syncopal episode, failure to resolve symptoms, allergic reaction, symptom recurrence, and elevation of blood sugar in diabetics. Cortisone may cause immunosuppression. Patient understands the risks. All questions were answered. After discussion of options, patient requested an injection. Information regarding the injection was given to the patient. Which medications to stop prior to the injection was explained to the patient as well.  Follow up in 1-2 weeks post injection for re-evaluation.  Continue Home exercises, stretching, activity modification, physical therapy, and conservative care. Patient is presenting with acute/sub-acute radicular pain with impairment in ADLs and functionality.  The pain has not responded sufficiently to  conservative care including nsaid therapy and/or physical therapy.  There is no bleeding tendency, unstable medical condition, or systemic infection. The purpose of the spinal injections is to facilitate active therapy by providing short term relief through reduction of pain and inflammation.   Injections, by themselves, are not likely to provide long-term relief. Rather, active rehabilitation with modified work achieves long-term relief by increasing active ROM, strength and stability. LESI L4/5   2) Lidocaine Ointment.

## 2024-05-16 NOTE — PHYSICAL EXAM
[Rotation to left] : rotation to left [Rotation to right] : rotation to right [de-identified] : left lateral rotation 75 degrees [TWNoteComboBox6] : right lateral rotation 75 degrees [] : no palpable masses

## 2024-05-22 ENCOUNTER — TRANSCRIPTION ENCOUNTER (OUTPATIENT)
Age: 64
End: 2024-05-22

## 2024-05-24 ENCOUNTER — APPOINTMENT (OUTPATIENT)
Age: 64
End: 2024-05-24
Payer: MEDICARE

## 2024-05-24 PROCEDURE — 62323 NJX INTERLAMINAR LMBR/SAC: CPT

## 2024-05-28 ENCOUNTER — RX RENEWAL (OUTPATIENT)
Age: 64
End: 2024-05-28

## 2024-06-12 ENCOUNTER — APPOINTMENT (OUTPATIENT)
Dept: PAIN MANAGEMENT | Facility: CLINIC | Age: 64
End: 2024-06-12

## 2024-06-19 ENCOUNTER — APPOINTMENT (OUTPATIENT)
Dept: PULMONOLOGY | Facility: CLINIC | Age: 64
End: 2024-06-19
Payer: MEDICARE

## 2024-06-19 VITALS
HEART RATE: 95 BPM | HEIGHT: 62 IN | RESPIRATION RATE: 18 BRPM | WEIGHT: 147 LBS | SYSTOLIC BLOOD PRESSURE: 112 MMHG | OXYGEN SATURATION: 96 % | DIASTOLIC BLOOD PRESSURE: 70 MMHG | BODY MASS INDEX: 27.05 KG/M2 | TEMPERATURE: 97.4 F

## 2024-06-19 DIAGNOSIS — G47.33 OBSTRUCTIVE SLEEP APNEA (ADULT) (PEDIATRIC): ICD-10-CM

## 2024-06-19 DIAGNOSIS — J45.909 UNSPECIFIED ASTHMA, UNCOMPLICATED: ICD-10-CM

## 2024-06-19 DIAGNOSIS — J30.9 ALLERGIC RHINITIS, UNSPECIFIED: ICD-10-CM

## 2024-06-19 DIAGNOSIS — K21.9 GASTRO-ESOPHAGEAL REFLUX DISEASE W/OUT ESOPHAGITIS: ICD-10-CM

## 2024-06-19 DIAGNOSIS — R06.02 SHORTNESS OF BREATH: ICD-10-CM

## 2024-06-19 PROCEDURE — ZZZZZ: CPT

## 2024-06-19 PROCEDURE — 90471 IMMUNIZATION ADMIN: CPT

## 2024-06-19 PROCEDURE — 94010 BREATHING CAPACITY TEST: CPT

## 2024-06-19 PROCEDURE — 94727 GAS DIL/WSHOT DETER LNG VOL: CPT

## 2024-06-19 PROCEDURE — 90715 TDAP VACCINE 7 YRS/> IM: CPT | Mod: GY

## 2024-06-19 PROCEDURE — 99214 OFFICE O/P EST MOD 30 MIN: CPT | Mod: 25

## 2024-06-19 PROCEDURE — 95012 NITRIC OXIDE EXP GAS DETER: CPT

## 2024-06-19 PROCEDURE — 94729 DIFFUSING CAPACITY: CPT

## 2024-06-19 NOTE — ADDENDUM
[FreeTextEntry1] : Documented by Bayron Ahuja acting as a scribe for Dr. Mathew Rao on 06/19/2024.   All medical record entries made by the Scribe were at my, Dr. Mathew Rao's, direction and personally dictated by me on 06/19/2024. I have reviewed the chart and agree that the record accurately reflects my personal performance of the history, physical exam, assessment and plan. I have also personally directed, reviewed, and agree with the discharge instructions.

## 2024-06-19 NOTE — HISTORY OF PRESENT ILLNESS
[FreeTextEntry1] : Ms. Dickerson is a 63 year old female with a history of allergic rhinitis, asthma, atypical chest pain, GERD, CRYSTAL, and SOB presenting to the office today via video call for a follow-up pulmonary evaluation. His chief complaint is  - she notes wanting to have a sleep study done - she notes the jermaine who made her oral appliance (Finger/Camilo) retired so she needs a new one  - she notes her eye is bothering her after having her cataracts removed  - she notes some constipation - she notes her sinuses are clear currently  - she notes she barely uses her inhaler, only when she really needs it  - she notes recently getting another epidural in her back and acupuncture for her knee  - she notes her biggest complaints are all orthopedic  -she denies any headaches, nausea, emesis, fever, chills, sweats, chest pain, chest pressure, coughing, wheezing, palpitations, constipation, diarrhea, vertigo, dysphagia, heartburn, reflux, itchy eyes, itchy ears, leg swelling, leg pain, arthralgias, myalgias, hoarseness, or sour taste in the mouth.

## 2024-06-19 NOTE — ASSESSMENT
[FreeTextEntry1] : Ms. Dickerson is a 63 year old female with vertigo, fibromyalgia, asthma, allergies, GERD, COVID-19 x4 (last 10/2023), OSAS and mild SOB (non-compliant); Her number one issue is orthopedic. pulmonary Stable  problem 1: asthma- controlled -continue to use Levalbuterol 0.63% by the nebulizer q6H -continue to use Accolate 20 mg BID -continue Trelegy 1 puff QD -continue ProAir 2 puffs Q6H, pre-exercise -Asthma is believed to be caused by inherited (genetic) and environmental factor, but its exact cause is unknown. Asthma may be triggered by allergens, lung infections, or irritants in the air. Asthma triggers are different for each person -Inhaler technique reviewed as well as oral hygiene techniques reviewed with patient. Avoidance of cold air, extremes of temperature, rescue inhaler should be used before exercise. Order of medication reviewed with patient. Recommended use of a cool mist humidifier in the bedroom.  problem 2: allergic rhinitis - controlled -continue Xhance 1 sniff BID -continue to use nasal saline/Xlear; Navage nasal spray -can continue OTC antihistamine PRN -continue to use Astelin (.15) 1 sniff/nostril BID -Environmental measures for allergies were encouraged including mattress and pillow cover, air purifier, and environmental controls.  problem 3: acute/chronic sinusitis -Nasal Polyps -recommended CT of sinuses w/o contrast -Consider Dupixent. -Dupixent is a prescription medicine used with other asthma medicines for the maintenance treatment of moderate-to-severe asthma in people aged 12 years and older whose asthma is not controlled with their current asthma medicines. Dupixent helps prevent severe asthma attacks (exacerbations) and can improve your breathing. Dupixent may also help reduce the amount of oral corticosteroids you need while preventing severe asthma attacks and improving your breathing. Dupixent is not used to treat sudden breathing problems. Risks and side effects of Dupixent were discussed and reviewed with patient.  problem 4: CRYSTAL (on Rx) -repeat HSS -recommended Epsom salt bath QHS -Dental Device ([place]) -recommended positional sleep -recommended to use "Chin Strap" -recommended to use extended release melatonin -Sleep apnea is associated with adverse clinical consequences which an affect most organ systems. Cardiovascular disease risk includes arrhythmias, atrial fibrillation, hypertension, coronary artery disease, and stroke. Metabolic disorders include diabetes type 2, non-alcoholic fatty liver disease. Mood disorder especially depression; and cognitive decline especially in the elderly. Associations with chronic reflux/Brothers's esophagus some but not all inclusive. -Reasons to assess this include arousal consistent with hypopnea; respiratory events most prominent in REM sleep or supine position; therefore sleep staging and body position are important for accurate diagnosis and estimation of AHI. -Treatment options discussed including CPAP/BiPAP machine, oral appliance, ProVent therapy, Oxy-Aid by Respitec, new technologies, or positional sleep.Recommended use of the CPAP machine for moderate (AHI >15), moderate to severe (AHI 15-30) and severe patients (AHI > 30). Recommended weight loss which can reduce AHI especially in weight loss of greater than 5% of BMI. Positional sleep is recommended in those with low AHI, low-moderate BMI, and younger age. For severe sleep apnea, the hypoglossal nerve stimulator was recommended as well.  problem 5: GERD (Francisco) -continue AcipHex 20 mg before breakfast -recommended Reflux Gourmet  -Rule of 2s: avoid eating too much, eating too late, eating too spicy, eating two hours before bed -Things to avoid including overeating, spicy foods, tight clothing, eating within three hours of bed, this list is not all inclusive -For treatment of reflux, possible options discussed including diet control, H2 blockers, PPIs, as well as coating motility agents discussed as treatment options. Timing of meals and proximity of last meal to sleep were discussed. If symptoms persist, a formal gastrointestinal evaluation is needed.  problem 6: atypical chest pain (resolved) -felt to be related to reflux and anxiety  problem 7: anxiety -recommended to read: "The Gift of Maybe," by Rebeca Aguilar -recommended supplemental Mg  problem 8: overweight -currently working with BODI -recommended to try WW. -recommended Berberine OTC supplement for visceral fat loss  -Weight loss, exercise, and diet control were discussed and are highly encouraged. Treatment options were given such as, aqua therapy, and contacting a nutritionist. Recommended to use the elliptical, stationary bike, less use of treadmill. Mindful eating was explained to the patient Obesity is associated with worsening asthma, shortness of breath, and potential for cardiac disease, diabetes, and other underlying medical conditions.  Problem 9: Health Maintenance/COVID19 Precautions: (COVID-19 x2 12/2021, 8/2022, 10/2023) -s/p COVID-19 vaccine x3 12/2021 - Clean your hands often. Wash your hands often with soap and water for at least 20 seconds, especially after blowing your nose, coughing, or sneezing, or having been in a public place. - If soap and water are not available, use a hand  that contains at least 60% alcohol. - To the extent possible, avoid touching high-touch surfaces in public places - elevator buttons, door handles, handrails, handshaking with people, etc. Use a tissue or your sleeve to cover your hand or finger if you must touch something. - Wash your hands after touching surfaces in public places. - Avoid touching your face, nose, eyes, etc. - Clean and disinfect your home to remove germs: practice routine cleaning of frequently touched surfaces (for example: tables, doorknobs, light switches, handles, desks, toilets, faucets, sinks & cell phones) - Avoid crowds, especially in poorly ventilated spaces. Your risk of exposure to respiratory viruses like COVID-19 may increase in crowded, closed-in settings with little air circulation if there are people in the crowd who are sick. All patients are recommended to practice social distancing and stay at least 6 feet away from others. - Avoid all non-essential travel including plane trips, and especially avoid embarking on cruise ships. -If COVID-19 is spreading in your community, take extra measures to put distance between yourself and other people to further reduce your risk of being exposed to this new virus. -Stay home as much as possible. - Consider ways of getting food brought to your house through family, social, or commercial networks -Be aware that the virus has been known to live in the air up to 3 hours post exposure, cardboard up to 24 hours post exposure, copper up to 4 hours post exposure, steel and plastic up to 2-3 days post exposure. Risk of transmission from these surfaces are affected by many variables.  problem 10: health maintenance -recommended Topricin cream -recommended SPM and quercetin -s/p yearly flu shot 2023 -s/p TDAP vaccine  -recommended strep pneumonia vaccines: Prevnar-20 vaccine, followed by Pneumo vaccine 23 one year following -recommended early intervention for URIs -recommended regular osteoporosis evaluations -recommended early dermatological evaluations -recommended after the age of 50 to the age of 70, colonoscopy every 5 years  F/P in 6 months SPI and NIOX. She is encouraged to call with any changes, concerns, or questions.

## 2024-06-19 NOTE — PROCEDURE
[FreeTextEntry1] : Full PFT reveals mild obstruction at mid to low volumes; FEV1 was 2.21 L which is 102% of predicted; normal lung volumes; normal diffusion at 18.9, which is 100% of predicted; normal flow volume loop. PFTs were performed to evaluate for SOB  FENO was 12; a normal value being less than 25 Fractional exhaled nitric oxide (FENO) is regarded as a simple, noninvasive method for assessing eosinophilic airway inflammation. Produced by a variety of cells within the lung, nitric oxide (NO) concentrations are generally low in healthy individuals. However, high concentrations of NO appear to be involved in nonspecific host defense mechanisms and chronic inflammatory diseases such as asthma. The American Thoracic Society (ATS) therefore has strongly recommended using FENO to aid in the assessment, management, and long-term monitoring of eosinophilic airway inflammation and asthma, and for identifying steroid responsive individuals whose chronic respiratory symptoms may be caused by airway inflammation. In their 2011 clinical practice guideline, the ATS emphasizes the importance of using FENO.

## 2024-06-26 ENCOUNTER — RX RENEWAL (OUTPATIENT)
Age: 64
End: 2024-06-26

## 2024-07-01 ENCOUNTER — TRANSCRIPTION ENCOUNTER (OUTPATIENT)
Age: 64
End: 2024-07-01

## 2024-07-03 ENCOUNTER — TRANSCRIPTION ENCOUNTER (OUTPATIENT)
Age: 64
End: 2024-07-03

## 2024-07-05 ENCOUNTER — NON-APPOINTMENT (OUTPATIENT)
Age: 64
End: 2024-07-05

## 2024-07-11 ENCOUNTER — RX RENEWAL (OUTPATIENT)
Age: 64
End: 2024-07-11

## 2024-07-26 ENCOUNTER — TRANSCRIPTION ENCOUNTER (OUTPATIENT)
Age: 64
End: 2024-07-26

## 2024-07-29 ENCOUNTER — RX RENEWAL (OUTPATIENT)
Age: 64
End: 2024-07-29

## 2024-07-31 ENCOUNTER — APPOINTMENT (OUTPATIENT)
Dept: PAIN MANAGEMENT | Facility: CLINIC | Age: 64
End: 2024-07-31
Payer: MEDICARE

## 2024-07-31 VITALS — WEIGHT: 149 LBS | BODY MASS INDEX: 27.42 KG/M2 | HEIGHT: 62 IN

## 2024-07-31 DIAGNOSIS — M54.16 RADICULOPATHY, LUMBAR REGION: ICD-10-CM

## 2024-07-31 PROCEDURE — 99214 OFFICE O/P EST MOD 30 MIN: CPT

## 2024-07-31 RX ORDER — GABAPENTIN 300 MG/1
300 CAPSULE ORAL
Qty: 90 | Refills: 2 | Status: ACTIVE | COMMUNITY
Start: 2024-07-31 | End: 1900-01-01

## 2024-07-31 NOTE — PHYSICAL EXAM
[Rotation to left] : rotation to left [Rotation to right] : rotation to right [de-identified] : left lateral rotation 75 degrees [TWNoteComboBox6] : right lateral rotation 75 degrees [] : no palpable masses

## 2024-07-31 NOTE — HISTORY OF PRESENT ILLNESS
[5] : 5 [Throbbing] : throbbing [FreeTextEntry6] : numbness  [Neck] : neck [Lower back] : lower back [10] : 10 [Dull/Aching] : dull/aching [Radiating] : radiating [Sharp] : sharp [Tingling] : tingling [Constant] : constant [Household chores] : household chores [Leisure] : leisure [Sleep] : sleep [Rest] : rest [Meds] : meds [Ice] : ice [Heat] : heat [Injection therapy] : injection therapy [Standing] : standing [Walking] : walking [Bending forward] : bending forward [Exercising] : exercising [Retired] : Work status: retired [FreeTextEntry1] : 07/31/2024: Follow up today after LESI L3-4 on 5/24 with 75% relief for two weeks. Pain at baseline.   5/15/24- fu for MRI [facility]: - 5/10/24: Postsurgical lumbar spine with progressive chronic degenerative changes at L1-2 and L3-4 levels as described.  compared to MRI from 10/16/202. On personal review, L3/4 disc getting worse, associated with stenosis.   05/08/2024: Follow up today.  Has pain now in low back.  Does not radiate.  No numbness or tingling.   Will get new MRI of lumbar spine- Last was 2020 7/26/23- MRI cervical spine (7/5/23) 1. Reversal of the cervical lordosis and multilevel spondylolisthesis. 2. Encroachment upon the cord and bilateral exiting nerve roots at C4-C5, impingement upon right greater than  left exiting C7 nerve roots with central stenosis and broad-based posterior disc herniation at C6-C7, and  encroachment upon bilateral exiting C8 nerve roots at C7-T1. 3. Multilevel degenerative endplate changes and multilevel uncovertebral hypertrophy without acute fracture or  cord compression.  Pain in the neck with radiation to the shoulders.   6/28/23- fu for Left SI joint injection on 5/5.  C/o pain and numbness in left arm.  Would recommend a repeat MRI.    11/11/2022: follow up today after right cervical RFA on 10/24/22. Had 80% relief so far. Now pain is in left SI joint will repeat injection.    08/18/2022: follow up today after left cervical RFA on 7/22/22.  Had 50% so far.  Has not had as much improvement as in past.  Also complains of pain in thoracic area.  Will give TPI  7/15/22-  L LS MBB without relief;  c/o pain in lower back, mainly L sided with cramping in L ankle ; consider caudal michael prior L upper cerv RFA with >70% relief sustained several months with improved rom and adls, will repeat. Has been on Topamax in the past with good relief.   3/25/22: follow up today to MRI lumbar spine: (3/17/22) Impression: 1. Mild central stenosis with bilateral exiting L3 nerve root impingement at L3-L4, scoliosis, and postoperative changes without recurrent disc herniation at L4-L5. 2. Multilevel degenerative disc disease and posterior protrusion at T11-T12 with hemangioma at L1 and multilevel foraminal narrowing. 3. No acute fracture, discitis, or enhancing postoperative fluid collection on postcontrast imaging. 4. Clinical correlation regarding prior surgical history is recommended. has pain in the right foot when she puts weight on it. Thought initially it was a neuroma. however ruled out with imaging.  Had fusion L4/5 in September 2020. I would recommend EMG/NCV> She would like to repeat cervical RFA as well.  3/9/22: follow up [place]. Pain in the left trap. yesterday she bent down and she felt a pop. now when she bends she feels like her back is going to give out. difficulty with change of position. Pain in the left lower back. Pain radiates down the posterior left leg.  8/13/21: follow up today after left SIJ injection on 7/23/21. She reports great relief initially and pain recurred about 3-4 days later. takes flexeril PRN (makes her sleepy) Pain under the left shoulder blade. Had CHERYL's in the past with relief. Consider left SIJ RFA.  7/23/21: follow up today after right cervical RFA on 7/8/21. patient starting to feel better. Since last visit had CT scan and met with Dr. Hunter Chow who recommended a Left SIJ injection.  2/26/21: follow up today after right cervical RFA on 11/13/21. Had 60% improvement but now the left side of neck is hurting again. MRI 10/20- S/P L4-L5 laminectomy . Hardware intact Will give TPI today and schedule repeat RFA on left. SCS info given  8.14.20: follow up today after left cervical RFA. Pt with some improvement. I would recommend she give it another 4 weeks. I would recommend right cervical RFA as it has helped in the past.  7.3.20 - follow up today after left L5/S1 and S1 and CHERYL. The neck improved about 50%.Has left lower back pain. sitting aggravated her left lower back. numbness in the legs. last left lumbar RFA was in September. [] : no [FreeTextEntry7] : to the left side, right leg [FreeTextEntry9] : Motrin  [de-identified] : Driving [de-identified] : MRI

## 2024-07-31 NOTE — HISTORY OF PRESENT ILLNESS
[5] : 5 [Throbbing] : throbbing [FreeTextEntry6] : numbness  [Neck] : neck [Lower back] : lower back [10] : 10 [Dull/Aching] : dull/aching [Radiating] : radiating [Sharp] : sharp [Tingling] : tingling [Constant] : constant [Household chores] : household chores [Leisure] : leisure [Sleep] : sleep [Rest] : rest [Meds] : meds [Ice] : ice [Heat] : heat [Injection therapy] : injection therapy [Standing] : standing [Walking] : walking [Bending forward] : bending forward [Exercising] : exercising [Retired] : Work status: retired [FreeTextEntry1] : 07/31/2024: Follow up today after LESI L3-4 on 5/24 with 75% relief for two weeks. Pain at baseline.   5/15/24- fu for MRI [facility]: - 5/10/24: Postsurgical lumbar spine with progressive chronic degenerative changes at L1-2 and L3-4 levels as described.  compared to MRI from 10/16/202. On personal review, L3/4 disc getting worse, associated with stenosis.   05/08/2024: Follow up today.  Has pain now in low back.  Does not radiate.  No numbness or tingling.   Will get new MRI of lumbar spine- Last was 2020 7/26/23- MRI cervical spine (7/5/23) 1. Reversal of the cervical lordosis and multilevel spondylolisthesis. 2. Encroachment upon the cord and bilateral exiting nerve roots at C4-C5, impingement upon right greater than  left exiting C7 nerve roots with central stenosis and broad-based posterior disc herniation at C6-C7, and  encroachment upon bilateral exiting C8 nerve roots at C7-T1. 3. Multilevel degenerative endplate changes and multilevel uncovertebral hypertrophy without acute fracture or  cord compression.  Pain in the neck with radiation to the shoulders.   6/28/23- fu for Left SI joint injection on 5/5.  C/o pain and numbness in left arm.  Would recommend a repeat MRI.    11/11/2022: follow up today after right cervical RFA on 10/24/22. Had 80% relief so far. Now pain is in left SI joint will repeat injection.    08/18/2022: follow up today after left cervical RFA on 7/22/22.  Had 50% so far.  Has not had as much improvement as in past.  Also complains of pain in thoracic area.  Will give TPI  7/15/22-  L LS MBB without relief;  c/o pain in lower back, mainly L sided with cramping in L ankle ; consider caudal michael prior L upper cerv RFA with >70% relief sustained several months with improved rom and adls, will repeat. Has been on Topamax in the past with good relief.   3/25/22: follow up today to MRI lumbar spine: (3/17/22) Impression: 1. Mild central stenosis with bilateral exiting L3 nerve root impingement at L3-L4, scoliosis, and postoperative changes without recurrent disc herniation at L4-L5. 2. Multilevel degenerative disc disease and posterior protrusion at T11-T12 with hemangioma at L1 and multilevel foraminal narrowing. 3. No acute fracture, discitis, or enhancing postoperative fluid collection on postcontrast imaging. 4. Clinical correlation regarding prior surgical history is recommended. has pain in the right foot when she puts weight on it. Thought initially it was a neuroma. however ruled out with imaging.  Had fusion L4/5 in September 2020. I would recommend EMG/NCV> She would like to repeat cervical RFA as well.  3/9/22: follow up [place]. Pain in the left trap. yesterday she bent down and she felt a pop. now when she bends she feels like her back is going to give out. difficulty with change of position. Pain in the left lower back. Pain radiates down the posterior left leg.  8/13/21: follow up today after left SIJ injection on 7/23/21. She reports great relief initially and pain recurred about 3-4 days later. takes flexeril PRN (makes her sleepy) Pain under the left shoulder blade. Had CHERYL's in the past with relief. Consider left SIJ RFA.  7/23/21: follow up today after right cervical RFA on 7/8/21. patient starting to feel better. Since last visit had CT scan and met with Dr. Hunter Chow who recommended a Left SIJ injection.  2/26/21: follow up today after right cervical RFA on 11/13/21. Had 60% improvement but now the left side of neck is hurting again. MRI 10/20- S/P L4-L5 laminectomy . Hardware intact Will give TPI today and schedule repeat RFA on left. SCS info given  8.14.20: follow up today after left cervical RFA. Pt with some improvement. I would recommend she give it another 4 weeks. I would recommend right cervical RFA as it has helped in the past.  7.3.20 - follow up today after left L5/S1 and S1 and CHERYL. The neck improved about 50%.Has left lower back pain. sitting aggravated her left lower back. numbness in the legs. last left lumbar RFA was in September. [] : no [FreeTextEntry7] : to the left side, right leg [FreeTextEntry9] : Motrin  [de-identified] : Driving [de-identified] : MRI

## 2024-07-31 NOTE — PHYSICAL EXAM
[Rotation to left] : rotation to left [Rotation to right] : rotation to right [de-identified] : left lateral rotation 75 degrees [TWNoteComboBox6] : right lateral rotation 75 degrees [] : no palpable masses

## 2024-07-31 NOTE — PHYSICAL EXAM
[Rotation to left] : rotation to left [Rotation to right] : rotation to right [de-identified] : left lateral rotation 75 degrees [TWNoteComboBox6] : right lateral rotation 75 degrees [] : no palpable masses

## 2024-07-31 NOTE — ASSESSMENT
[FreeTextEntry1] : After discussing various treatment options with the patient including but not limited to oral medications, physical therapy, exercise, modalities as well as interventional spinal injections, we have decided with the following plan:  1) Intervention Injection Therapy: I personally reviewed the MRI/CT scan images and agree with the radiologist's report. The radiological findings were discussed with the patient. The risks, benefits, contents and alternatives to injection were explained in full to the patient. Risks outlined include but are not limited to infection,sepsis, bleeding, post-dural puncture headache, nerve damage, temporary increase in pain, syncopal episode, failure to resolve symptoms, allergic reaction, symptom recurrence, and elevation of blood sugar in diabetics. Cortisone may cause immunosuppression. Patient understands the risks. All questions were answered. After discussion of options, patient requested an injection. Information regarding the injection was given to the patient. Which medications to stop prior to the injection was explained to the patient as well.  Follow up in 1-2 weeks post injection for re-evaluation.  Continue Home exercises, stretching, activity modification, physical therapy, and conservative care. Patient is presenting with acute/sub-acute radicular pain with impairment in ADLs and functionality.  The pain has not responded sufficiently to  conservative care including nsaid therapy and/or physical therapy.  There is no bleeding tendency, unstable medical condition, or systemic infection. The purpose of the spinal injections is to facilitate active therapy by providing short term relief through reduction of pain and inflammation.   Injections, by themselves, are not likely to provide long-term relief. Rather, active rehabilitation with modified work achieves long-term relief by increasing active ROM, strength and stability. LESI L4/5   2) Lidocaine Ointment.  I would recommend a trial of neuropathic medication as patient presents with signs of nerve irritation. (ie burning, paresthesias etc) Goals of therapy would be to improve pain and overall QOL. Side effects reviewed with patient. Patient will call or stop medication if given side effects occur. gabapentin

## 2024-08-06 ENCOUNTER — TRANSCRIPTION ENCOUNTER (OUTPATIENT)
Age: 64
End: 2024-08-06

## 2024-08-08 ENCOUNTER — TRANSCRIPTION ENCOUNTER (OUTPATIENT)
Age: 64
End: 2024-08-08

## 2024-08-08 ENCOUNTER — APPOINTMENT (OUTPATIENT)
Dept: PULMONOLOGY | Facility: CLINIC | Age: 64
End: 2024-08-08

## 2024-08-08 PROCEDURE — 99442: CPT | Mod: 93

## 2024-08-18 ENCOUNTER — NON-APPOINTMENT (OUTPATIENT)
Age: 64
End: 2024-08-18

## 2024-08-19 ENCOUNTER — APPOINTMENT (OUTPATIENT)
Dept: ORTHOPEDIC SURGERY | Facility: CLINIC | Age: 64
End: 2024-08-19
Payer: MEDICARE

## 2024-08-19 VITALS — HEIGHT: 62 IN | BODY MASS INDEX: 25.58 KG/M2 | WEIGHT: 139 LBS

## 2024-08-19 DIAGNOSIS — Z96.652 PRESENCE OF LEFT ARTIFICIAL KNEE JOINT: ICD-10-CM

## 2024-08-19 DIAGNOSIS — S80.02XA CONTUSION OF LEFT KNEE, INITIAL ENCOUNTER: ICD-10-CM

## 2024-08-19 DIAGNOSIS — T84.098A OTHER MECHANICAL COMPLICATION OF OTHER INTERNAL JOINT PROSTHESIS, INITIAL ENCOUNTER: ICD-10-CM

## 2024-08-19 DIAGNOSIS — Z96.659 OTHER MECHANICAL COMPLICATION OF OTHER INTERNAL JOINT PROSTHESIS, INITIAL ENCOUNTER: ICD-10-CM

## 2024-08-19 DIAGNOSIS — S80.01XA CONTUSION OF RIGHT KNEE, INITIAL ENCOUNTER: ICD-10-CM

## 2024-08-19 PROCEDURE — 73562 X-RAY EXAM OF KNEE 3: CPT | Mod: LT

## 2024-08-19 PROCEDURE — G2211 COMPLEX E/M VISIT ADD ON: CPT

## 2024-08-19 PROCEDURE — 99213 OFFICE O/P EST LOW 20 MIN: CPT

## 2024-08-19 NOTE — PHYSICAL EXAM
[de-identified] : General appearance: well-nourished and hydrated, pleasant, alert and oriented x 3, cooperative. HEENT: Normocephalic, EOM intact, Nasal septum midline, Oral cavity clear, External auditory canal clear. Cardiovascular: no apparent abnormalities, no lower leg edema, no varicosities, pedal pulses are palpable. Lymphatics Lymph nodes: none palpated, Lymphedema: not present. Neurologic: sensation is normal, no muscle weakness in upper or lower extremities, patella tendon reflexes intact. Dermatologic no apparent skin lesions, moist, warm, no rash. Spine: cervical spine appears normal and moves freely, thoracic spine appears normal and moves freely, lumbosacral spine appears normal and moves freely. Gait: nonantalgic.  Left Knee Inspection: moderate effusion  Wounds: healed midline incision Alignment: normal. Palpation: medial and peripatellar tenderness on palpation. ROM: Active (in degrees): 0-140 stable on full extension Ligamentous laxity (neg): negative ant. drawer test, negative post. drawer test, stable to varus stress test, stable to valgus stress test, few millimeters of medial later laxity, AP translation of 5 mm  Patellofemoral Alignment Test: Q angle-, normal. Muscle Test: good quad strength. Leg examination: calf is soft and non-tender.   Right Knee Inspection: no effusion. Superficial abrasion Wounds: healed midline incision Alignment: normal. Palpation: medial and peripatellar tenderness on palpation. ROM: Active (in degrees): 0-145 with crepitus Ligamentous laxity (neg): negative ant. drawer test, negative post. drawer test, stable to varus stress test, stable to valgus stress test, few millimeters of medial later laxity, AP translation of 5 mm  Patellofemoral Alignment Test: Q angle-, normal. Muscle Test: good quad strength. Leg examination: calf is soft and non-tender.  [de-identified] : Left knee x-ray, AP, lateral, merchant view taken at the office today demonstrates a revision total knee replacement in satisfactory position and alignment. No evidence of loosening. The patella sits in a central position.   Right knee x-ray merchant view taken at the office today demonstrates joint space maintained and a well centered patella.

## 2024-08-19 NOTE — HISTORY OF PRESENT ILLNESS
[de-identified] : RASHARD GALLEGOS  63 year old female presents for evaluation of left knee pain. She is 1 year s/p left revision with flexion instability and popliteal tendonitis. She notes she is no longer working with  due to her lower back pain. She notes she is bowling and walking for exercise at this time. She notes she has been wearing a left knee sleeve.   She notes she has tried to do yoga as well.  [facility] [place] [facility] and is scared she did more damage. She is currently taking Motrin and icing her left knee at this time. She continues to have left knee pain and difficulty extending her left knee. She notes tightness as well. She notes buckling, swelling and spasm as well.  She notes she is undergoing treatment for her back. She has L4-5 fusion and L3-4 compression. She notes she had an epidural injection in May that helped temporarily. She will get Quadell injection next week. She is on Gabapentin.

## 2024-08-19 NOTE — ADDENDUM
[FreeTextEntry1] : This note was written by Kemi Dos Santos on 08/19/2024 acting as scribe for Dr. Stephan Salvador M.D.   I, Dr. Stephan Salvador, have read and attest that all the information, medical decision making and discharge instructions within are true and accurate.

## 2024-08-19 NOTE — DISCUSSION/SUMMARY
[de-identified] : Patient's Bilateral knee symptoms are secondary to a fall as noted above. Regarding her right revision TKR, she does have flexion instability which is a source of her reoccurring effusions. I did recommend a tibial component exchange, but she does not want surgery as she will be undergoing caudal block for her back as noted above and repositioning of her right eye lens. In the interim, I suggested she continue home exercise program to maintain her motion and strength and compression sleeve.    I will see them back in 3-4 months with x-rays.

## 2024-08-30 ENCOUNTER — APPOINTMENT (OUTPATIENT)
Age: 64
End: 2024-08-30

## 2024-09-06 ENCOUNTER — APPOINTMENT (OUTPATIENT)
Age: 64
End: 2024-09-06
Payer: MEDICARE

## 2024-09-06 PROCEDURE — 62323 NJX INTERLAMINAR LMBR/SAC: CPT

## 2024-09-17 ENCOUNTER — OUTPATIENT (OUTPATIENT)
Dept: OUTPATIENT SERVICES | Facility: HOSPITAL | Age: 64
LOS: 1 days | End: 2024-09-17
Payer: MEDICARE

## 2024-09-17 ENCOUNTER — APPOINTMENT (OUTPATIENT)
Dept: MAMMOGRAPHY | Facility: IMAGING CENTER | Age: 64
End: 2024-09-17
Payer: MEDICARE

## 2024-09-17 DIAGNOSIS — Z98.1 ARTHRODESIS STATUS: Chronic | ICD-10-CM

## 2024-09-17 DIAGNOSIS — Z98.890 OTHER SPECIFIED POSTPROCEDURAL STATES: Chronic | ICD-10-CM

## 2024-09-17 DIAGNOSIS — Z00.8 ENCOUNTER FOR OTHER GENERAL EXAMINATION: ICD-10-CM

## 2024-09-17 DIAGNOSIS — Z96.659 PRESENCE OF UNSPECIFIED ARTIFICIAL KNEE JOINT: Chronic | ICD-10-CM

## 2024-09-17 PROCEDURE — 77063 BREAST TOMOSYNTHESIS BI: CPT | Mod: 26

## 2024-09-17 PROCEDURE — 77067 SCR MAMMO BI INCL CAD: CPT

## 2024-09-17 PROCEDURE — 77063 BREAST TOMOSYNTHESIS BI: CPT

## 2024-09-17 PROCEDURE — 77067 SCR MAMMO BI INCL CAD: CPT | Mod: 26

## 2024-09-25 ENCOUNTER — APPOINTMENT (OUTPATIENT)
Dept: PAIN MANAGEMENT | Facility: CLINIC | Age: 64
End: 2024-09-25
Payer: MEDICARE

## 2024-09-25 VITALS — WEIGHT: 141 LBS | BODY MASS INDEX: 25.95 KG/M2 | HEIGHT: 62 IN

## 2024-09-25 PROCEDURE — 99213 OFFICE O/P EST LOW 20 MIN: CPT

## 2024-09-25 NOTE — HISTORY OF PRESENT ILLNESS
[Neck] : neck [Lower back] : lower back [10] : 10 [Dull/Aching] : dull/aching [Radiating] : radiating [Sharp] : sharp [Tingling] : tingling [Constant] : constant [Household chores] : household chores [Leisure] : leisure [Sleep] : sleep [Rest] : rest [Meds] : meds [Ice] : ice [Heat] : heat [Injection therapy] : injection therapy [Standing] : standing [Walking] : walking [Bending forward] : bending forward [Exercising] : exercising [Retired] : Work status: retired [FreeTextEntry1] : 9/25/24: follow up today for L4-5 LESI on 9/6/24 without any relief. Pain is across the lower back, pain worse in the morning and at night. Pain worse after being in one position for long periods.  Pain to the right anterior thigh.   07/31/2024: Follow up today after LESI L3-4 on 5/24 with 75% relief for two weeks. Pain at baseline.   5/15/24- fu for MRI [facility]: - 5/10/24: Postsurgical lumbar spine with progressive chronic degenerative changes at L1-2 and L3-4 levels as described.  compared to MRI from 10/16/2022. On personal review, L3/4 disc getting worse, associated with stenosis.   05/08/2024: Follow up today.  Has pain now in low back.  Does not radiate.  No numbness or tingling.   Will get new MRI of lumbar spine- Last was 2020 7/26/23- MRI cervical spine (7/5/23) 1. Reversal of the cervical lordosis and multilevel spondylolisthesis. 2. Encroachment upon the cord and bilateral exiting nerve roots at C4-C5, impingement upon right greater than  left exiting C7 nerve roots with central stenosis and broad-based posterior disc herniation at C6-C7, and  encroachment upon bilateral exiting C8 nerve roots at C7-T1. 3. Multilevel degenerative endplate changes and multilevel uncovertebral hypertrophy without acute fracture or  cord compression.  Pain in the neck with radiation to the shoulders.   6/28/23- fu for Left SI joint injection on 5/5.  C/o pain and numbness in left arm.  Would recommend a repeat MRI.    11/11/2022: follow up today after right cervical RFA on 10/24/22. Had 80% relief so far. Now pain is in left SI joint will repeat injection.    08/18/2022: follow up today after left cervical RFA on 7/22/22.  Had 50% so far.  Has not had as much improvement as in past.  Also complains of pain in thoracic area.  Will give TPI  7/15/22-  L LS MBB without relief;  c/o pain in lower back, mainly L sided with cramping in L ankle ; consider caudal michael prior L upper cerv RFA with >70% relief sustained several months with improved rom and adls, will repeat. Has been on Topamax in the past with good relief.   3/25/22: follow up today to MRI lumbar spine: (3/17/22) Impression: 1. Mild central stenosis with bilateral exiting L3 nerve root impingement at L3-L4, scoliosis, and postoperative changes without recurrent disc herniation at L4-L5. 2. Multilevel degenerative disc disease and posterior protrusion at T11-T12 with hemangioma at L1 and multilevel foraminal narrowing. 3. No acute fracture, discitis, or enhancing postoperative fluid collection on postcontrast imaging. 4. Clinical correlation regarding prior surgical history is recommended. has pain in the right foot when she puts weight on it. Thought initially it was a neuroma. however ruled out with imaging.  Had fusion L4/5 in September 2020. I would recommend EMG/NCV> She would like to repeat cervical RFA as well.  3/9/22: follow up toady. Pain in the left trap. yesterday she bent down and she felt a pop. now when she bends she feels like her back is going to give out. difficulty with change of position. Pain in the left lower back. Pain radiates down the posterior left leg.  8/13/21: follow up today after left SIJ injection on 7/23/21. She reports great relief initially and pain recurred about 3-4 days later. takes flexeril PRN (makes her sleepy) Pain under the left shoulder blade. Had CHERYL's in the past with relief. Consider left SIJ RFA.  7/23/21: follow up today after right cervical RFA on 7/8/21. patient starting to feel better. Since last visit had CT scan and met with Dr. Hunter Chow who recommended a Left SIJ injection.  2/26/21: follow up today after right cervical RFA on 11/13/21. Had 60% improvement but now the left side of neck is hurting again. MRI 10/20- S/P L4-L5 laminectomy . Hardware intact Will give TPI today and schedule repeat RFA on left. SCS info given  8.14.20: follow up today after left cervical RFA. Pt with some improvement. I would recommend she give it another 4 weeks. I would recommend right cervical RFA as it has helped in the past.  7.3.20 - follow up today after left L5/S1 and S1 and CHERYL. The neck improved about 50%.Has left lower back pain. sitting aggravated her left lower back. numbness in the legs. last left lumbar RFA was in September. [] : no [FreeTextEntry7] : to the left side, right leg [FreeTextEntry9] : Motrin  [de-identified] : Driving,  [de-identified] : MRI

## 2024-09-25 NOTE — PHYSICAL EXAM
[Rotation to left] : rotation to left [Rotation to right] : rotation to right [de-identified] : left lateral rotation 75 degrees [TWNoteComboBox6] : right lateral rotation 75 degrees [] : motor exam is 5/5 throughout both lower extremities with normal tone

## 2024-09-25 NOTE — HISTORY OF PRESENT ILLNESS
[Neck] : neck [Lower back] : lower back [10] : 10 [Dull/Aching] : dull/aching [Radiating] : radiating [Sharp] : sharp [Tingling] : tingling [Constant] : constant [Household chores] : household chores [Leisure] : leisure [Sleep] : sleep [Rest] : rest [Meds] : meds [Ice] : ice [Heat] : heat [Injection therapy] : injection therapy [Standing] : standing [Walking] : walking [Bending forward] : bending forward [Exercising] : exercising [Retired] : Work status: retired [FreeTextEntry1] : 9/25/24: follow up today for L4-5 LESI on 9/6/24 without any relief. Pain is across the lower back, pain worse in the morning and at night. Pain worse after being in one position for long periods.  Pain to the right anterior thigh.   07/31/2024: Follow up today after LESI L3-4 on 5/24 with 75% relief for two weeks. Pain at baseline.   5/15/24- fu for MRI [facility]: - 5/10/24: Postsurgical lumbar spine with progressive chronic degenerative changes at L1-2 and L3-4 levels as described.  compared to MRI from 10/16/2022. On personal review, L3/4 disc getting worse, associated with stenosis.   05/08/2024: Follow up today.  Has pain now in low back.  Does not radiate.  No numbness or tingling.   Will get new MRI of lumbar spine- Last was 2020 7/26/23- MRI cervical spine (7/5/23) 1. Reversal of the cervical lordosis and multilevel spondylolisthesis. 2. Encroachment upon the cord and bilateral exiting nerve roots at C4-C5, impingement upon right greater than  left exiting C7 nerve roots with central stenosis and broad-based posterior disc herniation at C6-C7, and  encroachment upon bilateral exiting C8 nerve roots at C7-T1. 3. Multilevel degenerative endplate changes and multilevel uncovertebral hypertrophy without acute fracture or  cord compression.  Pain in the neck with radiation to the shoulders.   6/28/23- fu for Left SI joint injection on 5/5.  C/o pain and numbness in left arm.  Would recommend a repeat MRI.    11/11/2022: follow up today after right cervical RFA on 10/24/22. Had 80% relief so far. Now pain is in left SI joint will repeat injection.    08/18/2022: follow up today after left cervical RFA on 7/22/22.  Had 50% so far.  Has not had as much improvement as in past.  Also complains of pain in thoracic area.  Will give TPI  7/15/22-  L LS MBB without relief;  c/o pain in lower back, mainly L sided with cramping in L ankle ; consider caudal michael prior L upper cerv RFA with >70% relief sustained several months with improved rom and adls, will repeat. Has been on Topamax in the past with good relief.   3/25/22: follow up today to MRI lumbar spine: (3/17/22) Impression: 1. Mild central stenosis with bilateral exiting L3 nerve root impingement at L3-L4, scoliosis, and postoperative changes without recurrent disc herniation at L4-L5. 2. Multilevel degenerative disc disease and posterior protrusion at T11-T12 with hemangioma at L1 and multilevel foraminal narrowing. 3. No acute fracture, discitis, or enhancing postoperative fluid collection on postcontrast imaging. 4. Clinical correlation regarding prior surgical history is recommended. has pain in the right foot when she puts weight on it. Thought initially it was a neuroma. however ruled out with imaging.  Had fusion L4/5 in September 2020. I would recommend EMG/NCV> She would like to repeat cervical RFA as well.  3/9/22: follow up toady. Pain in the left trap. yesterday she bent down and she felt a pop. now when she bends she feels like her back is going to give out. difficulty with change of position. Pain in the left lower back. Pain radiates down the posterior left leg.  8/13/21: follow up today after left SIJ injection on 7/23/21. She reports great relief initially and pain recurred about 3-4 days later. takes flexeril PRN (makes her sleepy) Pain under the left shoulder blade. Had CHERYL's in the past with relief. Consider left SIJ RFA.  7/23/21: follow up today after right cervical RFA on 7/8/21. patient starting to feel better. Since last visit had CT scan and met with Dr. Hunter Chow who recommended a Left SIJ injection.  2/26/21: follow up today after right cervical RFA on 11/13/21. Had 60% improvement but now the left side of neck is hurting again. MRI 10/20- S/P L4-L5 laminectomy . Hardware intact Will give TPI today and schedule repeat RFA on left. SCS info given  8.14.20: follow up today after left cervical RFA. Pt with some improvement. I would recommend she give it another 4 weeks. I would recommend right cervical RFA as it has helped in the past.  7.3.20 - follow up today after left L5/S1 and S1 and CHERYL. The neck improved about 50%.Has left lower back pain. sitting aggravated her left lower back. numbness in the legs. last left lumbar RFA was in September. [] : no [FreeTextEntry7] : to the left side, right leg [FreeTextEntry9] : Motrin  [de-identified] : Driving,  [de-identified] : MRI

## 2024-09-25 NOTE — ASSESSMENT
[FreeTextEntry1] : After discussing various treatment options with the patient including but not limited to oral medications, physical therapy, exercise, modalities as well as interventional spinal injections, we have decided with the following plan:  1) continue gabapentin 200mg (can not tolerate higher dosages)  2) surgical evaluation- Dr. Alvarez Can consider SCS

## 2024-09-25 NOTE — PHYSICAL EXAM
[Rotation to left] : rotation to left [Rotation to right] : rotation to right [de-identified] : left lateral rotation 75 degrees [TWNoteComboBox6] : right lateral rotation 75 degrees [] : motor exam is 5/5 throughout both lower extremities with normal tone

## 2024-10-01 ENCOUNTER — APPOINTMENT (OUTPATIENT)
Dept: ORTHOPEDIC SURGERY | Facility: CLINIC | Age: 64
End: 2024-10-01
Payer: MEDICARE

## 2024-10-01 ENCOUNTER — NON-APPOINTMENT (OUTPATIENT)
Age: 64
End: 2024-10-01

## 2024-10-01 VITALS — BODY MASS INDEX: 25.95 KG/M2 | HEIGHT: 62 IN | WEIGHT: 141 LBS

## 2024-10-01 DIAGNOSIS — Z98.1: ICD-10-CM

## 2024-10-01 DIAGNOSIS — M54.16 RADICULOPATHY, LUMBAR REGION: ICD-10-CM

## 2024-10-01 DIAGNOSIS — M51.369: ICD-10-CM

## 2024-10-01 DIAGNOSIS — M40.209 UNSPECIFIED KYPHOSIS, SITE UNSPECIFIED: ICD-10-CM

## 2024-10-01 DIAGNOSIS — M43.16 SPONDYLOLISTHESIS, LUMBAR REGION: ICD-10-CM

## 2024-10-01 PROCEDURE — 99215 OFFICE O/P EST HI 40 MIN: CPT

## 2024-10-01 PROCEDURE — 72100 X-RAY EXAM L-S SPINE 2/3 VWS: CPT

## 2024-10-02 PROBLEM — M51.369 ADJACENT SEGMENT DISEASE OF LUMBAR SPINE WITH HISTORY OF FUSION PROCEDURE: Status: ACTIVE | Noted: 2024-10-02

## 2024-10-02 PROBLEM — M43.16 SPONDYLOLISTHESIS OF LUMBAR REGION: Status: ACTIVE | Noted: 2024-10-02

## 2024-10-02 PROBLEM — M40.209 KYPHOSIS, ACQUIRED: Status: ACTIVE | Noted: 2024-10-02

## 2024-10-02 NOTE — ASSESSMENT
[FreeTextEntry1] : weakness, reflex changes,  radicular pain in l3 dermatome;  correlates with MRI findings: high grade foraminal stenosis due to loss of disc height, foraminal disc herniation component, segmental kyphosis;   has not responded to reasonable nonsurgical care;  discussed the anticipated benefits of surgery and compared it to what are rare complications;  given the impairment in her activity there is actually more risk to not doing the surgery than associated with it;  there may be left sided hip flexor weakness post op;  which is evident often , maybe 25%;  that is associated with the proposed XLIF transpsoas approach;

## 2024-10-02 NOTE — PHYSICAL EXAM
[Normal Mood and Affect] : normal mood and affect [Oriented] : oriented [Able to Communicate] : able to communicate [Peripheral vascular exam is grossly normal] : peripheral vascular exam is grossly normal [Extension] : extension [Bending to left] : bending to left [Bending to right] : bending to right [4___] : right quadriceps 4[unfilled]/5 [5___] : right dorsiflexors 5[unfilled]/5 [Left lower extremity above knee] : left lower extremity above knee [Right lower extremity above knee] : right lower extremity above knee [Diminished] : achilles reflex diminished [] : no trochanteric bursa tenderness [de-identified] : bilateral L3 dermatome of pain [de-identified] : radicular pain with lumbar extension and lateral bend

## 2024-10-02 NOTE — ASSESSMENT
[FreeTextEntry1] : weakness, reflex changes,  radicular pain in l3 dermatome;  correlates with MRI findings: high grade foraminal stenosis due to loss of disc height, foraminal disc herniation component, segmental kyphosis;   has not responded to reasonable nonsurgical care;  discussed the anticipated benefits of surgery and compared it to what are rare complications;  given the impairment in her activity there is actually more risk to not doing the surgery than associated with it;  there may be left sided hip flexor weakness post op;  which is evident often , maybe 25%;  that is associated with the proposed XLIF transpsoas approach;   7

## 2024-10-02 NOTE — HISTORY OF PRESENT ILLNESS
[Lower back] : lower back [Dull/Aching] : dull/aching [Radiating] : radiating [Constant] : constant [Leisure] : leisure [Sleep] : sleep [Nothing helps with pain getting better] : Nothing helps with pain getting better [de-identified] : Back pain and thigh leg pain, knee pain ;  while active, upright, standing and trying to move around;  some relief while lying down and sitting at certain positions and times. has not responded to PT, multiple injections, prescription and OTC meds;  underwent prior lumbar surgery that was helpful;  the current pain is worse than that was;  has worsened over time despite interventions that in the past provided transient but partial relief;  [] : no [FreeTextEntry5] : 63-year-old patient is here to be evaluated and treated for the L SPINE. Pain started in 2020 after a fusion. pain radiates down the side of the leg. Patient was previously treated by Dr. Monroe. [FreeTextEntry7] : down the side of the leg [de-identified] : MRI @

## 2024-10-02 NOTE — HISTORY OF PRESENT ILLNESS
[Lower back] : lower back [Dull/Aching] : dull/aching [Radiating] : radiating [Constant] : constant [Leisure] : leisure [Sleep] : sleep [Nothing helps with pain getting better] : Nothing helps with pain getting better [de-identified] : Back pain and thigh leg pain, knee pain ;  while active, upright, standing and trying to move around;  some relief while lying down and sitting at certain positions and times. has not responded to PT, multiple injections, prescription and OTC meds;  underwent prior lumbar surgery that was helpful;  the current pain is worse than that was;  has worsened over time despite interventions that in the past provided transient but partial relief;  [] : no [FreeTextEntry5] : 63-year-old patient is here to be evaluated and treated for the L SPINE. Pain started in 2020 after a fusion. pain radiates down the side of the leg. Patient was previously treated by Dr. Monroe. [FreeTextEntry7] : down the side of the leg [de-identified] : MRI @

## 2024-10-02 NOTE — PHYSICAL EXAM
[Normal Mood and Affect] : normal mood and affect [Oriented] : oriented [Able to Communicate] : able to communicate [Peripheral vascular exam is grossly normal] : peripheral vascular exam is grossly normal [Extension] : extension [Bending to left] : bending to left [Bending to right] : bending to right [4___] : right quadriceps 4[unfilled]/5 [5___] : right dorsiflexors 5[unfilled]/5 [Left lower extremity above knee] : left lower extremity above knee [Right lower extremity above knee] : right lower extremity above knee [Diminished] : achilles reflex diminished [] : no trochanteric bursa tenderness [de-identified] : bilateral L3 dermatome of pain [de-identified] : radicular pain with lumbar extension and lateral bend

## 2024-10-04 ENCOUNTER — APPOINTMENT (OUTPATIENT)
Dept: ORTHOPEDIC SURGERY | Facility: CLINIC | Age: 64
End: 2024-10-04

## 2024-10-04 ENCOUNTER — NON-APPOINTMENT (OUTPATIENT)
Age: 64
End: 2024-10-04

## 2024-10-04 ENCOUNTER — TRANSCRIPTION ENCOUNTER (OUTPATIENT)
Age: 64
End: 2024-10-04

## 2024-10-07 ENCOUNTER — RX RENEWAL (OUTPATIENT)
Age: 64
End: 2024-10-07

## 2024-10-14 ENCOUNTER — NON-APPOINTMENT (OUTPATIENT)
Age: 64
End: 2024-10-14

## 2024-10-15 ENCOUNTER — NON-APPOINTMENT (OUTPATIENT)
Age: 64
End: 2024-10-15

## 2024-10-21 ENCOUNTER — TRANSCRIPTION ENCOUNTER (OUTPATIENT)
Age: 64
End: 2024-10-21

## 2024-10-25 ENCOUNTER — OUTPATIENT (OUTPATIENT)
Dept: OUTPATIENT SERVICES | Facility: HOSPITAL | Age: 64
LOS: 1 days | Discharge: HOME | End: 2024-10-25
Payer: MEDICARE

## 2024-10-25 VITALS
TEMPERATURE: 98 F | RESPIRATION RATE: 18 BRPM | WEIGHT: 150.8 LBS | HEART RATE: 98 BPM | OXYGEN SATURATION: 95 % | DIASTOLIC BLOOD PRESSURE: 83 MMHG | SYSTOLIC BLOOD PRESSURE: 107 MMHG | HEIGHT: 62 IN

## 2024-10-25 DIAGNOSIS — G47.33 OBSTRUCTIVE SLEEP APNEA (ADULT) (PEDIATRIC): ICD-10-CM

## 2024-10-25 DIAGNOSIS — I10 ESSENTIAL (PRIMARY) HYPERTENSION: ICD-10-CM

## 2024-10-25 DIAGNOSIS — Z98.890 OTHER SPECIFIED POSTPROCEDURAL STATES: Chronic | ICD-10-CM

## 2024-10-25 DIAGNOSIS — Z96.659 PRESENCE OF UNSPECIFIED ARTIFICIAL KNEE JOINT: Chronic | ICD-10-CM

## 2024-10-25 DIAGNOSIS — K21.9 GASTRO-ESOPHAGEAL REFLUX DISEASE WITHOUT ESOPHAGITIS: ICD-10-CM

## 2024-10-25 DIAGNOSIS — M40.209 UNSPECIFIED KYPHOSIS, SITE UNSPECIFIED: ICD-10-CM

## 2024-10-25 DIAGNOSIS — F41.9 ANXIETY DISORDER, UNSPECIFIED: ICD-10-CM

## 2024-10-25 DIAGNOSIS — Z01.818 ENCOUNTER FOR OTHER PREPROCEDURAL EXAMINATION: ICD-10-CM

## 2024-10-25 DIAGNOSIS — E78.5 HYPERLIPIDEMIA, UNSPECIFIED: ICD-10-CM

## 2024-10-25 DIAGNOSIS — J45.909 UNSPECIFIED ASTHMA, UNCOMPLICATED: ICD-10-CM

## 2024-10-25 DIAGNOSIS — Z98.1 ARTHRODESIS STATUS: Chronic | ICD-10-CM

## 2024-10-25 LAB
A1C WITH ESTIMATED AVERAGE GLUCOSE RESULT: 5.6 % — SIGNIFICANT CHANGE UP (ref 4–5.6)
ALBUMIN SERPL ELPH-MCNC: 3.8 G/DL — SIGNIFICANT CHANGE UP (ref 3.3–5)
ALP SERPL-CCNC: 87 U/L — SIGNIFICANT CHANGE UP (ref 40–120)
ALT FLD-CCNC: 31 U/L — SIGNIFICANT CHANGE UP (ref 12–78)
ANION GAP SERPL CALC-SCNC: 10 MMOL/L — SIGNIFICANT CHANGE UP (ref 5–17)
APTT BLD: 30 SEC — SIGNIFICANT CHANGE UP (ref 24.5–35.6)
AST SERPL-CCNC: 29 U/L — SIGNIFICANT CHANGE UP (ref 15–37)
BASOPHILS # BLD AUTO: 0.04 K/UL — SIGNIFICANT CHANGE UP (ref 0–0.2)
BASOPHILS NFR BLD AUTO: 0.6 % — SIGNIFICANT CHANGE UP (ref 0–2)
BILIRUB SERPL-MCNC: 0.9 MG/DL — SIGNIFICANT CHANGE UP (ref 0.2–1.2)
BLD GP AB SCN SERPL QL: SIGNIFICANT CHANGE UP
BUN SERPL-MCNC: 23 MG/DL — SIGNIFICANT CHANGE UP (ref 7–23)
CALCIUM SERPL-MCNC: 9.6 MG/DL — SIGNIFICANT CHANGE UP (ref 8.5–10.1)
CHLORIDE SERPL-SCNC: 107 MMOL/L — SIGNIFICANT CHANGE UP (ref 96–108)
CO2 SERPL-SCNC: 23 MMOL/L — SIGNIFICANT CHANGE UP (ref 22–31)
CREAT SERPL-MCNC: 1 MG/DL — SIGNIFICANT CHANGE UP (ref 0.5–1.3)
EGFR: 62 ML/MIN/1.73M2 — SIGNIFICANT CHANGE UP
EOSINOPHIL # BLD AUTO: 0.09 K/UL — SIGNIFICANT CHANGE UP (ref 0–0.5)
EOSINOPHIL NFR BLD AUTO: 1.4 % — SIGNIFICANT CHANGE UP (ref 0–6)
ESTIMATED AVERAGE GLUCOSE: 114 MG/DL — SIGNIFICANT CHANGE UP (ref 68–114)
GLUCOSE SERPL-MCNC: 94 MG/DL — SIGNIFICANT CHANGE UP (ref 70–99)
HCT VFR BLD CALC: 45.3 % — HIGH (ref 34.5–45)
HGB BLD-MCNC: 14.7 G/DL — SIGNIFICANT CHANGE UP (ref 11.5–15.5)
IMM GRANULOCYTES NFR BLD AUTO: 0.5 % — SIGNIFICANT CHANGE UP (ref 0–0.9)
INR BLD: 0.95 RATIO — SIGNIFICANT CHANGE UP (ref 0.85–1.16)
LYMPHOCYTES # BLD AUTO: 1.28 K/UL — SIGNIFICANT CHANGE UP (ref 1–3.3)
LYMPHOCYTES # BLD AUTO: 19.8 % — SIGNIFICANT CHANGE UP (ref 13–44)
MCHC RBC-ENTMCNC: 29.1 PG — SIGNIFICANT CHANGE UP (ref 27–34)
MCHC RBC-ENTMCNC: 32.5 G/DL — SIGNIFICANT CHANGE UP (ref 32–36)
MCV RBC AUTO: 89.7 FL — SIGNIFICANT CHANGE UP (ref 80–100)
MONOCYTES # BLD AUTO: 0.57 K/UL — SIGNIFICANT CHANGE UP (ref 0–0.9)
MONOCYTES NFR BLD AUTO: 8.8 % — SIGNIFICANT CHANGE UP (ref 2–14)
MRSA PCR RESULT.: SIGNIFICANT CHANGE UP
NEUTROPHILS # BLD AUTO: 4.47 K/UL — SIGNIFICANT CHANGE UP (ref 1.8–7.4)
NEUTROPHILS NFR BLD AUTO: 68.9 % — SIGNIFICANT CHANGE UP (ref 43–77)
NRBC # BLD: 0 /100 WBCS — SIGNIFICANT CHANGE UP (ref 0–0)
PLATELET # BLD AUTO: 347 K/UL — SIGNIFICANT CHANGE UP (ref 150–400)
POTASSIUM SERPL-MCNC: 4.6 MMOL/L — SIGNIFICANT CHANGE UP (ref 3.5–5.3)
POTASSIUM SERPL-SCNC: 4.6 MMOL/L — SIGNIFICANT CHANGE UP (ref 3.5–5.3)
PROT SERPL-MCNC: 7.8 GM/DL — SIGNIFICANT CHANGE UP (ref 6–8.3)
PROTHROM AB SERPL-ACNC: 11 SEC — SIGNIFICANT CHANGE UP (ref 9.9–13.4)
RBC # BLD: 5.05 M/UL — SIGNIFICANT CHANGE UP (ref 3.8–5.2)
RBC # FLD: 13.9 % — SIGNIFICANT CHANGE UP (ref 10.3–14.5)
S AUREUS DNA NOSE QL NAA+PROBE: SIGNIFICANT CHANGE UP
SODIUM SERPL-SCNC: 140 MMOL/L — SIGNIFICANT CHANGE UP (ref 135–145)
WBC # BLD: 6.48 K/UL — SIGNIFICANT CHANGE UP (ref 3.8–10.5)
WBC # FLD AUTO: 6.48 K/UL — SIGNIFICANT CHANGE UP (ref 3.8–10.5)

## 2024-10-25 PROCEDURE — 93010 ELECTROCARDIOGRAM REPORT: CPT

## 2024-10-25 NOTE — H&P PST ADULT - PROBLEM SELECTOR PLAN 8
Preop instructions provided including NPO status. Hibiclens wash for infection control. Patient aware to stop NSAID, OTC herbals  for 7-10 days, needs to be accompanied  by adult upon discharge.  Patient verbalized understanding  Medical and pulmonology evaluation needed.  anesthesiologist to review pst labs, ekg, medical clearances and optimization for surgery

## 2024-10-25 NOTE — H&P PST ADULT - NSICDXPASTMEDICALHX_GEN_ALL_CORE_FT
PAST MEDICAL HISTORY:  2019 novel coronavirus disease (COVID-19) 12/2021; August 2022    Asthma exercise induced and stress induced asthma - pt reports last exacerbation on 02/2021    GERD (gastroesophageal reflux disease)     H/O scoliosis     H/O spinal stenosis     Hemorrhoid     Herniated cervical disc all levels    HLD (hyperlipidemia)     HTN (hypertension)     IBS (irritable bowel syndrome)     IBS (irritable colon syndrome)     Obstructive Sleep Apnea on oral mouth device- follows up with pulmonologist    Osteoarthritis     Rotator Cuff Disorder left

## 2024-10-25 NOTE — H&P PST ADULT - MUSCULOSKELETAL
no calf tenderness/decreased ROM due to pain/strength 5/5 bilateral upper extremities/strength 5/5 bilateral lower extremities details…

## 2024-10-25 NOTE — H&P PST ADULT - HISTORY OF PRESENT ILLNESS
63 y/o pmhx allergic rhinitis, asthma, htn, hld, gerd, mary on oral device, anxiety/ depression,here for presurgical examination for planned Extreme lateral interbody fusion L3-L4, posterior instrumentation L3-L5 on 11/13/2024 with Dr. Alvarez. Denies recent travels in the past 30 days. No fever, SOB, cough, flu like symptoms or body rash- covid screen.

## 2024-10-25 NOTE — H&P PST ADULT - NSICDXPASTSURGICALHX_GEN_ALL_CORE_FT
PAST SURGICAL HISTORY:  Ectopic Pregnancy 2001     S/P cervical disc replacement     S/P hammer toe correction 2015,2016,2017    S/P knee replacement left 12/13/21    S/P laminectomy with spinal fusion L4-L5    S/P Lumpectomy of Breast left  benign 1985     S/P rotator cuff repair left 2013

## 2024-10-25 NOTE — H&P PST ADULT - PROBLEM SELECTOR PLAN 1
scheduled for planned Extreme lateral interbody fusion L3-L4, posterior instrumentation L3-L5 on 11/13/2024 with Dr. Alvarez.

## 2024-10-25 NOTE — H&P PST ADULT - ASSESSMENT
63 y/o pmhx allergic rhinitis, asthma, htn, hld, gerd, mary on oral device, anxiety/ depression,here for presurgical examination for planned Extreme lateral interbody fusion L3-L4, posterior instrumentation L3-L5 on 2024 with Dr. Alvarez. Denies recent travels in the past 30 days. No fever, SOB, cough, flu like symptoms or body rash- covid screen.    CAPRINI SCORE    AGE RELATED RISK FACTORS                                                             [ ] Age 41-60 years                                            (1 Point)  x[ ] Age: 61-74 years                                           (2 Points)                 [ ] Age= 75 years                                                (3 Points)             DISEASE RELATED RISK FACTORS                                                       [ ] Edema in the lower extremities                 (1 Point)                     [ ] Varicose veins                                               (1 Point)                                 [ x] BMI > 25 Kg/m2                                            (1 Point)                                  [ ] Serious infection (ie PNA)                            (1 Point)                     [ ] Lung disease ( COPD, Emphysema)            (1 Point)                                                                          [ ] Acute myocardial infarction                         (1 Point)                  [ ] Congestive heart failure (in the previous month)  (1 Point)         [ ] Inflammatory bowel disease                            (1 Point)                  [ ] Central venous access, PICC or Port               (2 points)       (within the last month)                                                                [ ] Stroke (in the previous month)                        (5 Points)    [ ] Previous or present malignancy                       (2 points)                                                                                                                                                         HEMATOLOGY RELATED FACTORS                                                         [ ] Prior episodes of VTE                                     (3 Points)                     [ ] Positive family history for VTE                      (3 Points)                  [ ] Prothrombin 50344 A                                     (3 Points)                     [ ] Factor V Leiden                                                (3 Points)                        [ ] Lupus anticoagulants                                      (3 Points)                                                           [ ] Anticardiolipin antibodies                              (3 Points)                                                       [ ] High homocysteine in the blood                   (3 Points)                                             [ ] Other congenital or acquired thrombophilia      (3 Points)                                                [ ] Heparin induced thrombocytopenia                  (3 Points)                                        MOBILITY RELATED FACTORS  [ ] Bed rest                                                         (1 Point)  [ ] Plaster cast                                                    (2 points)  [ ] Bed bound for more than 72 hours           (2 Points)    GENDER SPECIFIC FACTORS  [ ] Pregnancy or had a baby within the last month   (1 Point)  [ ] Post-partum < 6 weeks                                   (1 Point)  [ ] Hormonal therapy  or oral contraception   (1 Point)  [ ] History of pregnancy complications              (1 point)  [ ] Unexplained or recurrent              (1 Point)    OTHER RISK FACTORS                                           (1 Point)  [ ] BMI >40, smoking, diabetes requiring insulin, chemotherapy  blood transfusions and length of surgery over 2 hours    SURGERY RELATED RISK FACTORS  [ ]  Section within the last month     (1 Point)  [ ] Minor surgery                                                  (1 Point)  [ ] Arthroscopic surgery                                       (2 Points)  [ x] Planned major surgery lasting more            (2 Points)      than 45 minutes     [ ] Elective hip or knee joint replacement       (5 points)       surgery                                                TRAUMA RELATED RISK FACTORS  [ ] Fracture of the hip, pelvis, or leg                       (5 Points)  [ ] Spinal cord injury resulting in paralysis             (5 points)       (in the previous month)    [ ] Paralysis  (less than 1 month)                             (5 Points)  [ ] Multiple Trauma within 1 month                        (5 Points)    Total Score [   5     ]    Caprini Score 0-2: Low Risk, NO VTE prophylaxis required for most patients, encourage ambulation  Caprini Score 3-6: Moderate Risk , pharmacologic VTE prophylaxis is indicated for most patients (in the absence of contraindications)  Caprini Score Greater than or =7: High risk, pharmocologic VTE prophylaxis indicated for most patients (in the absence of contraindications)

## 2024-10-26 LAB — VIT D25+D1,25 OH+D1,25 PNL SERPL-MCNC: 56.3 PG/ML — SIGNIFICANT CHANGE UP (ref 19.9–79.3)

## 2024-10-29 ENCOUNTER — NON-APPOINTMENT (OUTPATIENT)
Age: 64
End: 2024-10-29

## 2024-10-30 ENCOUNTER — APPOINTMENT (OUTPATIENT)
Dept: PULMONOLOGY | Facility: CLINIC | Age: 64
End: 2024-10-30
Payer: MEDICARE

## 2024-10-30 VITALS
HEIGHT: 62 IN | OXYGEN SATURATION: 98 % | TEMPERATURE: 96.6 F | RESPIRATION RATE: 18 BRPM | WEIGHT: 151 LBS | SYSTOLIC BLOOD PRESSURE: 116 MMHG | HEART RATE: 89 BPM | BODY MASS INDEX: 27.79 KG/M2 | DIASTOLIC BLOOD PRESSURE: 74 MMHG

## 2024-10-30 DIAGNOSIS — G47.33 OBSTRUCTIVE SLEEP APNEA (ADULT) (PEDIATRIC): ICD-10-CM

## 2024-10-30 DIAGNOSIS — J45.909 UNSPECIFIED ASTHMA, UNCOMPLICATED: ICD-10-CM

## 2024-10-30 PROCEDURE — 99214 OFFICE O/P EST MOD 30 MIN: CPT

## 2024-10-30 PROCEDURE — ZZZZZ: CPT

## 2024-11-07 ENCOUNTER — RX RENEWAL (OUTPATIENT)
Age: 64
End: 2024-11-07

## 2024-11-13 ENCOUNTER — TRANSCRIPTION ENCOUNTER (OUTPATIENT)
Age: 64
End: 2024-11-13

## 2024-11-13 ENCOUNTER — INPATIENT (INPATIENT)
Facility: HOSPITAL | Age: 64
LOS: 1 days | Discharge: HOME | End: 2024-11-15
Attending: ORTHOPAEDIC SURGERY | Admitting: ORTHOPAEDIC SURGERY

## 2024-11-13 ENCOUNTER — APPOINTMENT (OUTPATIENT)
Dept: ORTHOPEDIC SURGERY | Facility: HOSPITAL | Age: 64
End: 2024-11-13

## 2024-11-13 VITALS
RESPIRATION RATE: 14 BRPM | WEIGHT: 149.03 LBS | HEART RATE: 78 BPM | SYSTOLIC BLOOD PRESSURE: 119 MMHG | DIASTOLIC BLOOD PRESSURE: 76 MMHG | HEIGHT: 62 IN | TEMPERATURE: 98 F | OXYGEN SATURATION: 96 %

## 2024-11-13 DIAGNOSIS — Z98.1 ARTHRODESIS STATUS: Chronic | ICD-10-CM

## 2024-11-13 DIAGNOSIS — Z98.890 OTHER SPECIFIED POSTPROCEDURAL STATES: Chronic | ICD-10-CM

## 2024-11-13 DIAGNOSIS — Z96.659 PRESENCE OF UNSPECIFIED ARTIFICIAL KNEE JOINT: Chronic | ICD-10-CM

## 2024-11-13 LAB
BLD GP AB SCN SERPL QL: SIGNIFICANT CHANGE UP
GLUCOSE BLDC GLUCOMTR-MCNC: 103 MG/DL — HIGH (ref 70–99)

## 2024-11-13 PROCEDURE — 22558 ARTHRD ANT NTRBD MIN DSC LUM: CPT

## 2024-11-13 PROCEDURE — 22558 ARTHRD ANT NTRBD MIN DSC LUM: CPT | Mod: AS

## 2024-11-13 PROCEDURE — 22853 INSJ BIOMECHANICAL DEVICE: CPT | Mod: AS

## 2024-11-13 PROCEDURE — 61783 SCAN PROC SPINAL: CPT

## 2024-11-13 PROCEDURE — 61783 SCAN PROC SPINAL: CPT | Mod: AS

## 2024-11-13 PROCEDURE — 22842 INSERT SPINE FIXATION DEVICE: CPT | Mod: AS

## 2024-11-13 PROCEDURE — 22853 INSJ BIOMECHANICAL DEVICE: CPT

## 2024-11-13 PROCEDURE — 20930 SP BONE ALGRFT MORSEL ADD-ON: CPT

## 2024-11-13 PROCEDURE — 22842 INSERT SPINE FIXATION DEVICE: CPT

## 2024-11-13 DEVICE — SCREW MAS POLYAXIAL 2C RELINE 6.5X45MM: Type: IMPLANTABLE DEVICE | Status: FUNCTIONAL

## 2024-11-13 DEVICE — SURGIFLO MATRIX WITH THROMBIN KIT: Type: IMPLANTABLE DEVICE | Status: FUNCTIONAL

## 2024-11-13 DEVICE — IMPLANTABLE DEVICE: Type: IMPLANTABLE DEVICE | Status: FUNCTIONAL

## 2024-11-13 DEVICE — SCREW LOCKG OPEN TULIP RELINE 5.5MM: Type: IMPLANTABLE DEVICE | Status: FUNCTIONAL

## 2024-11-13 DEVICE — K-WIRE NUVASIVE BEVEL/BLUNT NITINOL: Type: IMPLANTABLE DEVICE | Status: FUNCTIONAL

## 2024-11-13 DEVICE — PIN PULSE HIP AND REGISTRATION FIDUCIAL 10CM: Type: IMPLANTABLE DEVICE | Status: FUNCTIONAL

## 2024-11-13 RX ORDER — HYDROMORPHONE HCL/0.9% NACL/PF 6 MG/30 ML
0.5 PATIENT CONTROLLED ANALGESIA SYRINGE INTRAVENOUS
Refills: 0 | Status: DISCONTINUED | OUTPATIENT
Start: 2024-11-13 | End: 2024-11-14

## 2024-11-13 RX ORDER — APREPITANT 40 MG/1
40 CAPSULE ORAL ONCE
Refills: 0 | Status: COMPLETED | OUTPATIENT
Start: 2024-11-13 | End: 2024-11-13

## 2024-11-13 RX ORDER — ACETAMINOPHEN 500 MG
1000 TABLET ORAL ONCE
Refills: 0 | Status: DISCONTINUED | OUTPATIENT
Start: 2024-11-13 | End: 2024-11-15

## 2024-11-13 RX ORDER — ONDANSETRON HYDROCHLORIDE 2 MG/ML
4 INJECTION, SOLUTION INTRAMUSCULAR; INTRAVENOUS EVERY 6 HOURS
Refills: 0 | Status: DISCONTINUED | OUTPATIENT
Start: 2024-11-13 | End: 2024-11-15

## 2024-11-13 RX ORDER — TRAZODONE HYDROCHLORIDE 100 MG/1
50 TABLET ORAL DAILY
Refills: 0 | Status: DISCONTINUED | OUTPATIENT
Start: 2024-11-13 | End: 2024-11-15

## 2024-11-13 RX ORDER — MONTELUKAST SODIUM 10 MG/1
10 TABLET, FILM COATED ORAL DAILY
Refills: 0 | Status: DISCONTINUED | OUTPATIENT
Start: 2024-11-13 | End: 2024-11-15

## 2024-11-13 RX ORDER — ZAFIRLUKAST 20 MG/1
20 TABLET, COATED ORAL
Refills: 0 | Status: DISCONTINUED | OUTPATIENT
Start: 2024-11-13 | End: 2024-11-13

## 2024-11-13 RX ORDER — LOSARTAN POTASSIUM 25 MG/1
50 TABLET ORAL DAILY
Refills: 0 | Status: DISCONTINUED | OUTPATIENT
Start: 2024-11-13 | End: 2024-11-15

## 2024-11-13 RX ORDER — HYDROMORPHONE HCL/0.9% NACL/PF 6 MG/30 ML
4 PATIENT CONTROLLED ANALGESIA SYRINGE INTRAVENOUS EVERY 4 HOURS
Refills: 0 | Status: DISCONTINUED | OUTPATIENT
Start: 2024-11-13 | End: 2024-11-14

## 2024-11-13 RX ORDER — CLONAZEPAM 1 MG
0.5 TABLET ORAL
Refills: 0 | Status: DISCONTINUED | OUTPATIENT
Start: 2024-11-13 | End: 2024-11-15

## 2024-11-13 RX ORDER — POLYETHYLENE GLYCOL 3350 17 G/17G
17 POWDER, FOR SOLUTION ORAL
Refills: 0 | Status: DISCONTINUED | OUTPATIENT
Start: 2024-11-13 | End: 2024-11-15

## 2024-11-13 RX ORDER — HYDROMORPHONE HCL/0.9% NACL/PF 6 MG/30 ML
2 PATIENT CONTROLLED ANALGESIA SYRINGE INTRAVENOUS EVERY 4 HOURS
Refills: 0 | Status: DISCONTINUED | OUTPATIENT
Start: 2024-11-13 | End: 2024-11-14

## 2024-11-13 RX ORDER — SENNA 187 MG
2 TABLET ORAL AT BEDTIME
Refills: 0 | Status: DISCONTINUED | OUTPATIENT
Start: 2024-11-13 | End: 2024-11-15

## 2024-11-13 RX ORDER — SODIUM CHLORIDE 9 MG/ML
3 INJECTION, SOLUTION INTRAMUSCULAR; INTRAVENOUS; SUBCUTANEOUS EVERY 8 HOURS
Refills: 0 | Status: DISCONTINUED | OUTPATIENT
Start: 2024-11-13 | End: 2024-11-13

## 2024-11-13 RX ORDER — MELATONIN 5 MG
3 TABLET ORAL AT BEDTIME
Refills: 0 | Status: DISCONTINUED | OUTPATIENT
Start: 2024-11-13 | End: 2024-11-15

## 2024-11-13 RX ORDER — ZAFIRLUKAST 20 MG/1
1 TABLET, COATED ORAL
Refills: 0 | DISCHARGE

## 2024-11-13 RX ORDER — ACETAMINOPHEN 500 MG
975 TABLET ORAL EVERY 8 HOURS
Refills: 0 | Status: DISCONTINUED | OUTPATIENT
Start: 2024-11-13 | End: 2024-11-15

## 2024-11-13 RX ORDER — CEFAZOLIN SODIUM 1 G
2000 VIAL (EA) INJECTION EVERY 8 HOURS
Refills: 0 | Status: COMPLETED | OUTPATIENT
Start: 2024-11-13 | End: 2024-11-13

## 2024-11-13 RX ORDER — DIPHENHYDRAMINE HCL 12.5MG/5ML
12.5 ELIXIR ORAL EVERY 6 HOURS
Refills: 0 | Status: DISCONTINUED | OUTPATIENT
Start: 2024-11-13 | End: 2024-11-15

## 2024-11-13 RX ORDER — FLUTICASONE PROPIONATE AND SALMETEROL XINAFOATE 230; 21 UG/1; UG/1
1 AEROSOL, METERED RESPIRATORY (INHALATION)
Refills: 0 | Status: DISCONTINUED | OUTPATIENT
Start: 2024-11-13 | End: 2024-11-15

## 2024-11-13 RX ORDER — TRAZODONE HYDROCHLORIDE 100 MG/1
0 TABLET ORAL
Refills: 0 | DISCHARGE

## 2024-11-13 RX ORDER — HYDROMORPHONE HCL/0.9% NACL/PF 6 MG/30 ML
0.5 PATIENT CONTROLLED ANALGESIA SYRINGE INTRAVENOUS
Refills: 0 | Status: DISCONTINUED | OUTPATIENT
Start: 2024-11-13 | End: 2024-11-13

## 2024-11-13 RX ORDER — PANTOPRAZOLE SODIUM 40 MG/1
40 TABLET, DELAYED RELEASE ORAL
Refills: 0 | Status: DISCONTINUED | OUTPATIENT
Start: 2024-11-13 | End: 2024-11-15

## 2024-11-13 RX ORDER — TIOTROPIUM BROMIDE INHALATION SPRAY 1.56 UG/1
2 SPRAY, METERED RESPIRATORY (INHALATION) DAILY
Refills: 0 | Status: DISCONTINUED | OUTPATIENT
Start: 2024-11-13 | End: 2024-11-15

## 2024-11-13 RX ORDER — BUPROPION HCL 150 MG
150 TABLET,SUSTAINED-RELEASE 12 HR ORAL DAILY
Refills: 0 | Status: DISCONTINUED | OUTPATIENT
Start: 2024-11-13 | End: 2024-11-15

## 2024-11-13 RX ORDER — ALBUTEROL 90 MCG
2 AEROSOL (GRAM) INHALATION EVERY 6 HOURS
Refills: 0 | Status: DISCONTINUED | OUTPATIENT
Start: 2024-11-13 | End: 2024-11-15

## 2024-11-13 RX ORDER — CYCLOBENZAPRINE HYDROCHLORIDE 30 MG/1
10 CAPSULE, EXTENDED RELEASE ORAL EVERY 8 HOURS
Refills: 0 | Status: DISCONTINUED | OUTPATIENT
Start: 2024-11-13 | End: 2024-11-15

## 2024-11-13 RX ORDER — RABEPRAZOLE SODIUM 20 MG/1
1 TABLET, DELAYED RELEASE ORAL
Refills: 0 | DISCHARGE

## 2024-11-13 RX ORDER — ACETAMINOPHEN 500 MG
1000 TABLET ORAL ONCE
Refills: 0 | Status: COMPLETED | OUTPATIENT
Start: 2024-11-13 | End: 2024-11-13

## 2024-11-13 RX ADMIN — Medication 1000 MILLIGRAM(S): at 11:45

## 2024-11-13 RX ADMIN — Medication 100 MILLIGRAM(S): at 16:27

## 2024-11-13 RX ADMIN — Medication 975 MILLIGRAM(S): at 23:40

## 2024-11-13 RX ADMIN — Medication 4 MILLIGRAM(S): at 15:11

## 2024-11-13 RX ADMIN — Medication 0.5 MILLIGRAM(S): at 11:21

## 2024-11-13 RX ADMIN — ONDANSETRON HYDROCHLORIDE 4 MILLIGRAM(S): 2 INJECTION, SOLUTION INTRAMUSCULAR; INTRAVENOUS at 14:11

## 2024-11-13 RX ADMIN — Medication 0.5 MILLIGRAM(S): at 13:04

## 2024-11-13 RX ADMIN — Medication 400 MILLIGRAM(S): at 11:30

## 2024-11-13 RX ADMIN — TIOTROPIUM BROMIDE INHALATION SPRAY 2 PUFF(S): 1.56 SPRAY, METERED RESPIRATORY (INHALATION) at 18:35

## 2024-11-13 RX ADMIN — ONDANSETRON HYDROCHLORIDE 4 MILLIGRAM(S): 2 INJECTION, SOLUTION INTRAMUSCULAR; INTRAVENOUS at 20:50

## 2024-11-13 RX ADMIN — Medication 500 MILLILITER(S): at 16:26

## 2024-11-13 RX ADMIN — Medication 4 MILLIGRAM(S): at 20:28

## 2024-11-13 RX ADMIN — Medication 75 MILLILITER(S): at 14:11

## 2024-11-13 RX ADMIN — APREPITANT 40 MILLIGRAM(S): 40 CAPSULE ORAL at 06:58

## 2024-11-13 RX ADMIN — Medication 0.5 MILLIGRAM(S): at 12:47

## 2024-11-13 RX ADMIN — Medication 4 MILLIGRAM(S): at 21:28

## 2024-11-13 RX ADMIN — Medication 75 MILLILITER(S): at 11:24

## 2024-11-13 RX ADMIN — Medication 20 MILLIGRAM(S): at 22:48

## 2024-11-13 RX ADMIN — Medication 0.5 MILLIGRAM(S): at 11:36

## 2024-11-13 RX ADMIN — Medication 100 MILLIGRAM(S): at 23:56

## 2024-11-13 RX ADMIN — Medication 4 MILLIGRAM(S): at 14:11

## 2024-11-13 RX ADMIN — Medication 0.5 MILLIGRAM(S): at 11:38

## 2024-11-13 RX ADMIN — Medication 975 MILLIGRAM(S): at 22:47

## 2024-11-13 RX ADMIN — FLUTICASONE PROPIONATE AND SALMETEROL XINAFOATE 1 DOSE(S): 230; 21 AEROSOL, METERED RESPIRATORY (INHALATION) at 18:35

## 2024-11-13 RX ADMIN — Medication 0.5 MILLIGRAM(S): at 11:53

## 2024-11-13 RX ADMIN — Medication 0.5 MILLIGRAM(S): at 17:58

## 2024-11-13 RX ADMIN — Medication 0.5 MILLIGRAM(S): at 23:40

## 2024-11-13 RX ADMIN — Medication 0.5 MILLIGRAM(S): at 16:58

## 2024-11-13 RX ADMIN — Medication 0.5 MILLIGRAM(S): at 22:47

## 2024-11-13 RX ADMIN — Medication 2 TABLET(S): at 22:47

## 2024-11-13 NOTE — PHYSICAL THERAPY INITIAL EVALUATION ADULT - PHYSICAL ASSIST/NONPHYSICAL ASSIST: SCOOT/BRIDGE, REHAB EVAL
Would like update ,  Wondering if dr Issa talked to neurlologist,  Pt not any better.     Would like call asap.     supervision/verbal cues

## 2024-11-13 NOTE — DISCHARGE NOTE PROVIDER - CARE PROVIDER_API CALL
Anjel Alvarez  Orthopaedic Surgery  [address] [facility], [address]  [place] [id number]  Phone: [phone]  Fax: [phone]  Follow Up Time:

## 2024-11-13 NOTE — DISCHARGE NOTE PROVIDER - HOSPITAL COURSE
64yFemale with history of lumbar radiculopathy presenting for XLIF  L3-4 and PSF L3-5 by Dr. Anjel Alvarez on 11/13/24. Risk and benefits of surgery were explained to the patient. The patient understood and agreed to proceed with surgery. Patient underwent the procedure with no intraoperative complications. Pt was brought in stable condition to the PACU. Once stable in PACU, pt was brought to the floor. Pt hospital course was XX. Pt is stable for discharge to XX on POD# 64yFemale with history of lumbar radiculopathy presenting for XLIF  L3-4 and PSF L3-5 by Dr. Anjel Alvarez on 11/13/24. Risk and benefits of surgery were explained to the patient. The patient understood and agreed to proceed with surgery. Patient underwent the procedure with no intraoperative complications. Pt was brought in stable condition to the PACU. Once stable in PACU, pt was brought to the floor. Pt hospital course was unremarkable. Pt is stable for discharge to home with home care on POD# 2

## 2024-11-13 NOTE — PHYSICAL THERAPY INITIAL EVALUATION ADULT - ADDITIONAL COMMENTS
Patient lives alone in a pvt house c 3 entry steps c R rail up, 1 flight of stairs c L rail up inside. Independent c all ADL's and ambulation without device. Pt has own rolling walker and cane.

## 2024-11-13 NOTE — DISCHARGE NOTE PROVIDER - NSDCMRMEDTOKEN_GEN_ALL_CORE_FT
Accolate 20 mg oral tablet: 1 tab(s) orally once a day  albuterol 90 mcg/inh inhalation powder: 2 puff(s) inhaled every 6 hours, As Needed  atorvastatin 20 mg oral tablet: 1 tab(s) orally once a day  buPROPion 150 mg/24 hours (XL) oral tablet, extended release: 1 tab(s) orally every 24 hours  clonazePAM 0.5 mg oral tablet: 1 tab(s) orally , As Needed  ibuprofen 800 mg oral tablet: 1 tab(s) orally prn  olmesartan-hydrochlorothiazide 20 mg-12.5 mg oral tablet: 1 tab(s) orally once a day  RABEprazole 20 mg oral delayed release tablet: 1 tab(s) orally once a day  traZODone 50 mg oral tablet: orally prn  Trelegy Ellipta: inhaled once a day   Accolate 20 mg oral tablet: 1 tab(s) orally once a day  acetaminophen 325 mg oral tablet: 3 tab(s) orally every 8 hours  albuterol 90 mcg/inh inhalation powder: 2 puff(s) inhaled every 6 hours, As Needed  atorvastatin 20 mg oral tablet: 1 tab(s) orally once a day  buPROPion 150 mg/24 hours (XL) oral tablet, extended release: 1 tab(s) orally every 24 hours  clonazePAM 0.5 mg oral tablet: 1 tab(s) orally 2 times a day As needed anxiety  cyclobenzaprine 10 mg oral tablet: 1 tab(s) orally every 8 hours MDD: 3  Dilaudid 4 mg oral tablet: 1 tab(s) orally every 4 hours MDD: 6  Narcan 4 mg/0.1 mL nasal spray: 4 milligram(s) intranasally once , repeat as necessary.   As needed. For suspected opiate overdose   Follow instructions on packet MDD: 0.2 ml  olmesartan-hydrochlorothiazide 20 mg-12.5 mg oral tablet: 1 tab(s) orally once a day  ondansetron 4 mg oral tablet: 1 tab(s) orally every 6 hours as needed for  nasuea MDD: 4  RABEprazole 20 mg oral delayed release tablet: 1 tab(s) orally once a day  traZODone 50 mg oral tablet: orally prn  Trelegy Ellipta: inhaled once a day

## 2024-11-13 NOTE — PHYSICAL THERAPY INITIAL EVALUATION ADULT - RANGE OF MOTION EXAMINATION, REHAB EVAL
except trunk limited due to pain and precautions/bilateral upper extremity ROM was WNL (within normal limits)/bilateral lower extremity was ROM was WNL (within normal limits)/deficits as listed below

## 2024-11-13 NOTE — PHYSICAL THERAPY INITIAL EVALUATION ADULT - PERTINENT HX OF CURRENT PROBLEM, REHAB EVAL
Patient is a 63 y/o female admitted to [facility] due to S/P Extreme Lateral Interbody Fusion L3-4 POD#0.

## 2024-11-13 NOTE — DISCHARGE NOTE PROVIDER - NSDCFUADDAPPT_GEN_ALL_CORE_FT

## 2024-11-13 NOTE — ASU PREOP CHECKLIST - BP NONINVASIVE SYSTOLIC (MM HG)
REVIEW OF SYSTEMS     Constitutional  Eyes  Genitourinary  Neurological   No Activity change No Eye discharge No Difficulty urinating No Dizziness   No Appetite change No Eye itching No Painful intercourse No Facial asymmetry   No Chills No Eye pain No Painful urination No Headaches   No Sweating No Eye redness No Urine incontinence No Light headaches   No Fatigue No Lightsensitivity No Flank pain No Numbness   No Fever No Visual disturbance No Frequency No Seizures   No Unexpected weight change   No Genital sore No Speech difficulty      Respiratory No Blood in urine No Passing out    HEENT yes Apnea No Menstral problem yes Tremors   No Facial swelling No Chest tightness No Pelvic pain No Weakness   No Neck Pain No Choking No Penile discharge     No Neck stiffness No Cough No Penile pain  Hematologic   No Ear discharge No Shortness of breath No Penile swelling No Swollen lymph nodes   No Hearing loss No Stridor No Testicular pain No Bruises/bleeds easily   No Ear pain yes Wheezing No Urgency     yes Ringing in ears   No Urine decreased  Psychiatric   No Nosebleeds  Cardiovascular No Vaginal bleeding No Agitation   No Congestion No Chest pain No Vaginal discharge No Behavioral problems   No Runny nose No Leg swelling No Vaginal Pain No Confusion   No Postnasal drip No Palpitations   No Decreased concentration   yes Sneezing    Musculoskeletal No Unhappiness   No Sinus problems  Gastrointestinal No Joint pain No Hallucinations   No Dental problems No Abdominal distention yes Back pain No Hyperactive   No Drooling No Abdominal pain No Gait problems yes Nervous/anxious   No Mouth sores No Anal bleeding No Joint swelling No Self-harm   No Sore throat No Blood in stool No Muscle pain No Sleep disturbance   No Trouble swallowing yes Constipation   No Suicidal ideas   No Voice change yes Diarrhea  Skin       No Nausea No Color change       No Rectal pain No Pallor       No Vomitting No Rash         No Wound       Are you  on a blood thinner? NO  Do you have any infections? NO  Are you pregnant? No   Are you diabetic? NO   119

## 2024-11-13 NOTE — DISCHARGE NOTE PROVIDER - NSDCFUADDINST_GEN_ALL_CORE_FT
No bending/lifting/twisting/pulling/pushing/carrying or driving. No blood thinners (not limited to but including) aspirin, motrin, alleve, naproxen, etc.  May shower 5 days post op.  No direct water pressure over incision.  Change dressing daily as needed with a dry clean bandage.

## 2024-11-13 NOTE — DISCHARGE NOTE PROVIDER - NSDCFUSCHEDAPPT_GEN_ALL_CORE_FT
Anjel Alvarez  [place] [facility]  [address]  Scheduled Appointment: 11/26/2024    Mathew Rao  [facility]ician [facility]  [address]  Scheduled Appointment: 12/04/2024

## 2024-11-13 NOTE — PHYSICAL THERAPY INITIAL EVALUATION ADULT - GENERAL OBSERVATIONS, REHAB EVAL
Chart (EMR) reviewed. Received supine c HOB elevated, NAD. +O2 via nasal cannula, +heplock, +dressing to lowback intact, +B/L SCD's donned. Friend present. Alert. Ox4. Able to follow multistep commands/directions. Educated c spinal surgery precautions and provided c spinal surgery packet.

## 2024-11-13 NOTE — PATIENT PROFILE ADULT - MEDICATIONS/VISITS
[Current] : Current [TextBox_13] : Referred by Dr. Bria Urena.\par \par Ms. HOWARD is a 58 year old female with a history of HTN and COPD. Reviewed and confirmed that the patient meets screening eligibility criteria:\par \par 58 years old \par \par Smoking Status: Current Smoker\par \par Number of pack(s) per day: 1\par Number of years smoked: 42\par Number of pack years smokin\par \par No symptoms of lung cancer, including new cough, change in cough, hemoptysis, and unintentional weight loss.\par \par No personal history of lung cancer. No lung cancer in a first degree relative. No history of occupational exposures.  [N_Years] : 42 [PacksperYear] : 42 no

## 2024-11-13 NOTE — PHYSICAL THERAPY INITIAL EVALUATION ADULT - LEVEL OF INDEPENDENCE: SCOOT/BRIDGE, REHAB EVAL
Patrick Templeton  1970  [id number]    HISTORY OF PRESENT ILLNESS:  Mr. Templetno is a 53 y.o. male returns for a follow up visit for multiple medical problems.  His  presenting problems are   1. BWC-Disc displacement L5-S1    2. BWC-Sprain of sacroiliac region, subsequent encounter    3. BWC-Sprain of sacroiliac region, sequela    .    As per information/history obtained from the PADT(patient assessment and documentation tool) -  He complains of pain in the lower back with radiation to the upper leg Left He rates the pain 7/10 and describes it as sharp, numbness, pins and needles.  Pain is made worse by: movement, bending, lifting.  Current treatment regimen has helped relieve about 50% of the pain.  He denies side effects from the current pain regimen.   Patient reports that since last follow up visit the physical functioning is better, family/social relationships are better, mood is better sleep patterns are better.  Mr. Templeton states that since starting the treatment with the current regimen the  overall functioning  in the above aspects is  better,Patient denies neurological bowel or bladder. Patient denies misusing/abusing his narcotic pain medications or using any illegal drugs.  There are No indicators for possible drug abuse, addiction or diversion problems. Patient reports he has been doing fair, managing okay with the medications. He says he's using OxyContin along with Oxycodone for btp. He mentions he's working full time/over time hours. He complains the pain is worse in the legs than back. He says the medications helping with the pain and is coping with it. He mentions he is using Lunesta. Patient states his sleep is fair. Has fairly normal sleep latency. Averages about 4-6 hours of sleep a night. Denies any signs of sleep apnea. Feels somewhat rested in the morning.  Patient's  subjective report of his mood is fair. he describes occasional symptoms of depression, occasional  irritability and some mood swings.  supervision

## 2024-11-14 LAB
ANION GAP SERPL CALC-SCNC: 7 MMOL/L — SIGNIFICANT CHANGE UP (ref 5–17)
BASOPHILS # BLD AUTO: 0.01 K/UL — SIGNIFICANT CHANGE UP (ref 0–0.2)
BASOPHILS NFR BLD AUTO: 0.1 % — SIGNIFICANT CHANGE UP (ref 0–2)
BUN SERPL-MCNC: 13 MG/DL — SIGNIFICANT CHANGE UP (ref 7–23)
CALCIUM SERPL-MCNC: 8.8 MG/DL — SIGNIFICANT CHANGE UP (ref 8.5–10.1)
CHLORIDE SERPL-SCNC: 105 MMOL/L — SIGNIFICANT CHANGE UP (ref 96–108)
CO2 SERPL-SCNC: 27 MMOL/L — SIGNIFICANT CHANGE UP (ref 22–31)
CREAT SERPL-MCNC: 0.78 MG/DL — SIGNIFICANT CHANGE UP (ref 0.5–1.3)
EGFR: 84 ML/MIN/1.73M2 — SIGNIFICANT CHANGE UP
EOSINOPHIL # BLD AUTO: 0 K/UL — SIGNIFICANT CHANGE UP (ref 0–0.5)
EOSINOPHIL NFR BLD AUTO: 0 % — SIGNIFICANT CHANGE UP (ref 0–6)
GLUCOSE SERPL-MCNC: 142 MG/DL — HIGH (ref 70–99)
HCT VFR BLD CALC: 33.5 % — LOW (ref 34.5–45)
HGB BLD-MCNC: 10.8 G/DL — LOW (ref 11.5–15.5)
IMM GRANULOCYTES NFR BLD AUTO: 0.5 % — SIGNIFICANT CHANGE UP (ref 0–0.9)
LYMPHOCYTES # BLD AUTO: 0.71 K/UL — LOW (ref 1–3.3)
LYMPHOCYTES # BLD AUTO: 5.5 % — LOW (ref 13–44)
MCHC RBC-ENTMCNC: 29.7 PG — SIGNIFICANT CHANGE UP (ref 27–34)
MCHC RBC-ENTMCNC: 32.2 G/DL — SIGNIFICANT CHANGE UP (ref 32–36)
MCV RBC AUTO: 92 FL — SIGNIFICANT CHANGE UP (ref 80–100)
MONOCYTES # BLD AUTO: 0.74 K/UL — SIGNIFICANT CHANGE UP (ref 0–0.9)
MONOCYTES NFR BLD AUTO: 5.7 % — SIGNIFICANT CHANGE UP (ref 2–14)
NEUTROPHILS # BLD AUTO: 11.44 K/UL — HIGH (ref 1.8–7.4)
NEUTROPHILS NFR BLD AUTO: 88.2 % — HIGH (ref 43–77)
NRBC # BLD: 0 /100 WBCS — SIGNIFICANT CHANGE UP (ref 0–0)
PLATELET # BLD AUTO: 306 K/UL — SIGNIFICANT CHANGE UP (ref 150–400)
POTASSIUM SERPL-MCNC: 3.8 MMOL/L — SIGNIFICANT CHANGE UP (ref 3.5–5.3)
POTASSIUM SERPL-SCNC: 3.8 MMOL/L — SIGNIFICANT CHANGE UP (ref 3.5–5.3)
RBC # BLD: 3.64 M/UL — LOW (ref 3.8–5.2)
RBC # FLD: 13.9 % — SIGNIFICANT CHANGE UP (ref 10.3–14.5)
SODIUM SERPL-SCNC: 139 MMOL/L — SIGNIFICANT CHANGE UP (ref 135–145)
WBC # BLD: 12.97 K/UL — HIGH (ref 3.8–10.5)
WBC # FLD AUTO: 12.97 K/UL — HIGH (ref 3.8–10.5)

## 2024-11-14 RX ORDER — HYDROMORPHONE HCL/0.9% NACL/PF 6 MG/30 ML
2 PATIENT CONTROLLED ANALGESIA SYRINGE INTRAVENOUS
Refills: 0 | Status: DISCONTINUED | OUTPATIENT
Start: 2024-11-14 | End: 2024-11-15

## 2024-11-14 RX ORDER — HYDROMORPHONE HCL/0.9% NACL/PF 6 MG/30 ML
4 PATIENT CONTROLLED ANALGESIA SYRINGE INTRAVENOUS
Refills: 0 | Status: DISCONTINUED | OUTPATIENT
Start: 2024-11-14 | End: 2024-11-15

## 2024-11-14 RX ADMIN — Medication 4 MILLIGRAM(S): at 08:31

## 2024-11-14 RX ADMIN — Medication 2 TABLET(S): at 21:58

## 2024-11-14 RX ADMIN — Medication 975 MILLIGRAM(S): at 21:58

## 2024-11-14 RX ADMIN — Medication 975 MILLIGRAM(S): at 14:04

## 2024-11-14 RX ADMIN — Medication 4 MILLIGRAM(S): at 21:55

## 2024-11-14 RX ADMIN — Medication 4 MILLIGRAM(S): at 18:27

## 2024-11-14 RX ADMIN — Medication 4 MILLIGRAM(S): at 03:30

## 2024-11-14 RX ADMIN — Medication 4 MILLIGRAM(S): at 02:30

## 2024-11-14 RX ADMIN — Medication 4 MILLIGRAM(S): at 23:59

## 2024-11-14 RX ADMIN — Medication 2 PUFF(S): at 02:50

## 2024-11-14 RX ADMIN — Medication 0.5 MILLIGRAM(S): at 14:48

## 2024-11-14 RX ADMIN — LOSARTAN POTASSIUM 50 MILLIGRAM(S): 25 TABLET ORAL at 06:24

## 2024-11-14 RX ADMIN — Medication 4 MILLIGRAM(S): at 20:55

## 2024-11-14 RX ADMIN — POLYETHYLENE GLYCOL 3350 17 GRAM(S): 17 POWDER, FOR SOLUTION ORAL at 06:21

## 2024-11-14 RX ADMIN — MONTELUKAST SODIUM 10 MILLIGRAM(S): 10 TABLET, FILM COATED ORAL at 11:57

## 2024-11-14 RX ADMIN — TIOTROPIUM BROMIDE INHALATION SPRAY 2 PUFF(S): 1.56 SPRAY, METERED RESPIRATORY (INHALATION) at 11:57

## 2024-11-14 RX ADMIN — FLUTICASONE PROPIONATE AND SALMETEROL XINAFOATE 1 DOSE(S): 230; 21 AEROSOL, METERED RESPIRATORY (INHALATION) at 06:22

## 2024-11-14 RX ADMIN — Medication 975 MILLIGRAM(S): at 07:21

## 2024-11-14 RX ADMIN — Medication 975 MILLIGRAM(S): at 15:04

## 2024-11-14 RX ADMIN — Medication 2 PUFF(S): at 20:55

## 2024-11-14 RX ADMIN — CYCLOBENZAPRINE HYDROCHLORIDE 10 MILLIGRAM(S): 30 CAPSULE, EXTENDED RELEASE ORAL at 02:32

## 2024-11-14 RX ADMIN — Medication 0.5 MILLIGRAM(S): at 21:58

## 2024-11-14 RX ADMIN — Medication 975 MILLIGRAM(S): at 22:58

## 2024-11-14 RX ADMIN — Medication 975 MILLIGRAM(S): at 06:21

## 2024-11-14 RX ADMIN — Medication 150 MILLIGRAM(S): at 11:59

## 2024-11-14 RX ADMIN — Medication 0.5 MILLIGRAM(S): at 15:47

## 2024-11-14 RX ADMIN — PANTOPRAZOLE SODIUM 40 MILLIGRAM(S): 40 TABLET, DELAYED RELEASE ORAL at 07:25

## 2024-11-14 RX ADMIN — Medication 75 MILLILITER(S): at 08:58

## 2024-11-14 RX ADMIN — Medication 4 MILLIGRAM(S): at 17:29

## 2024-11-14 RX ADMIN — FLUTICASONE PROPIONATE AND SALMETEROL XINAFOATE 1 DOSE(S): 230; 21 AEROSOL, METERED RESPIRATORY (INHALATION) at 18:32

## 2024-11-14 RX ADMIN — Medication 4 MILLIGRAM(S): at 09:30

## 2024-11-14 RX ADMIN — POLYETHYLENE GLYCOL 3350 17 GRAM(S): 17 POWDER, FOR SOLUTION ORAL at 17:29

## 2024-11-14 RX ADMIN — Medication 20 MILLIGRAM(S): at 21:57

## 2024-11-15 ENCOUNTER — TRANSCRIPTION ENCOUNTER (OUTPATIENT)
Age: 64
End: 2024-11-15

## 2024-11-15 VITALS
SYSTOLIC BLOOD PRESSURE: 125 MMHG | DIASTOLIC BLOOD PRESSURE: 79 MMHG | RESPIRATION RATE: 18 BRPM | HEART RATE: 92 BPM | OXYGEN SATURATION: 93 %

## 2024-11-15 LAB
ANION GAP SERPL CALC-SCNC: 5 MMOL/L — SIGNIFICANT CHANGE UP (ref 5–17)
BASOPHILS # BLD AUTO: 0.03 K/UL — SIGNIFICANT CHANGE UP (ref 0–0.2)
BASOPHILS NFR BLD AUTO: 0.3 % — SIGNIFICANT CHANGE UP (ref 0–2)
BUN SERPL-MCNC: 16 MG/DL — SIGNIFICANT CHANGE UP (ref 7–23)
CALCIUM SERPL-MCNC: 8.8 MG/DL — SIGNIFICANT CHANGE UP (ref 8.5–10.1)
CHLORIDE SERPL-SCNC: 107 MMOL/L — SIGNIFICANT CHANGE UP (ref 96–108)
CO2 SERPL-SCNC: 28 MMOL/L — SIGNIFICANT CHANGE UP (ref 22–31)
CREAT SERPL-MCNC: 0.85 MG/DL — SIGNIFICANT CHANGE UP (ref 0.5–1.3)
EGFR: 76 ML/MIN/1.73M2 — SIGNIFICANT CHANGE UP
EOSINOPHIL # BLD AUTO: 0.06 K/UL — SIGNIFICANT CHANGE UP (ref 0–0.5)
EOSINOPHIL NFR BLD AUTO: 0.7 % — SIGNIFICANT CHANGE UP (ref 0–6)
GLUCOSE SERPL-MCNC: 101 MG/DL — HIGH (ref 70–99)
HCT VFR BLD CALC: 32.4 % — LOW (ref 34.5–45)
HGB BLD-MCNC: 10.4 G/DL — LOW (ref 11.5–15.5)
IMM GRANULOCYTES NFR BLD AUTO: 0.4 % — SIGNIFICANT CHANGE UP (ref 0–0.9)
LYMPHOCYTES # BLD AUTO: 1.63 K/UL — SIGNIFICANT CHANGE UP (ref 1–3.3)
LYMPHOCYTES # BLD AUTO: 17.7 % — SIGNIFICANT CHANGE UP (ref 13–44)
MCHC RBC-ENTMCNC: 30 PG — SIGNIFICANT CHANGE UP (ref 27–34)
MCHC RBC-ENTMCNC: 32.1 G/DL — SIGNIFICANT CHANGE UP (ref 32–36)
MCV RBC AUTO: 93.4 FL — SIGNIFICANT CHANGE UP (ref 80–100)
MONOCYTES # BLD AUTO: 0.76 K/UL — SIGNIFICANT CHANGE UP (ref 0–0.9)
MONOCYTES NFR BLD AUTO: 8.3 % — SIGNIFICANT CHANGE UP (ref 2–14)
NEUTROPHILS # BLD AUTO: 6.69 K/UL — SIGNIFICANT CHANGE UP (ref 1.8–7.4)
NEUTROPHILS NFR BLD AUTO: 72.6 % — SIGNIFICANT CHANGE UP (ref 43–77)
NRBC # BLD: 0 /100 WBCS — SIGNIFICANT CHANGE UP (ref 0–0)
PLATELET # BLD AUTO: 269 K/UL — SIGNIFICANT CHANGE UP (ref 150–400)
POTASSIUM SERPL-MCNC: 4.7 MMOL/L — SIGNIFICANT CHANGE UP (ref 3.5–5.3)
POTASSIUM SERPL-SCNC: 4.7 MMOL/L — SIGNIFICANT CHANGE UP (ref 3.5–5.3)
RBC # BLD: 3.47 M/UL — LOW (ref 3.8–5.2)
RBC # FLD: 14.5 % — SIGNIFICANT CHANGE UP (ref 10.3–14.5)
SODIUM SERPL-SCNC: 140 MMOL/L — SIGNIFICANT CHANGE UP (ref 135–145)
WBC # BLD: 9.21 K/UL — SIGNIFICANT CHANGE UP (ref 3.8–10.5)
WBC # FLD AUTO: 9.21 K/UL — SIGNIFICANT CHANGE UP (ref 3.8–10.5)

## 2024-11-15 RX ORDER — CLONAZEPAM 1 MG
1 TABLET ORAL
Qty: 0 | Refills: 0 | DISCHARGE
Start: 2024-11-15

## 2024-11-15 RX ORDER — NALOXONE HYDROCHLORIDE 1 MG/ML
4 INJECTION, SOLUTION INTRAMUSCULAR; INTRAVENOUS; SUBCUTANEOUS
Qty: 1 | Refills: 0
Start: 2024-11-15 | End: 2024-11-15

## 2024-11-15 RX ORDER — HYDROMORPHONE HCL/0.9% NACL/PF 6 MG/30 ML
1 PATIENT CONTROLLED ANALGESIA SYRINGE INTRAVENOUS
Qty: 42 | Refills: 0
Start: 2024-11-15 | End: 2024-11-21

## 2024-11-15 RX ORDER — CYCLOBENZAPRINE HYDROCHLORIDE 30 MG/1
1 CAPSULE, EXTENDED RELEASE ORAL
Qty: 21 | Refills: 0
Start: 2024-11-15 | End: 2024-11-21

## 2024-11-15 RX ORDER — ACETAMINOPHEN 500 MG
3 TABLET ORAL
Qty: 0 | Refills: 0 | DISCHARGE
Start: 2024-11-15

## 2024-11-15 RX ORDER — IBUPROFEN 200 MG
1 TABLET ORAL
Refills: 0 | DISCHARGE

## 2024-11-15 RX ORDER — MAGNESIUM, ALUMINUM HYDROXIDE 200-200 MG
30 TABLET,CHEWABLE ORAL EVERY 4 HOURS
Refills: 0 | Status: DISCONTINUED | OUTPATIENT
Start: 2024-11-15 | End: 2024-11-15

## 2024-11-15 RX ORDER — ONDANSETRON HYDROCHLORIDE 2 MG/ML
1 INJECTION, SOLUTION INTRAMUSCULAR; INTRAVENOUS
Qty: 20 | Refills: 0
Start: 2024-11-15 | End: 2024-11-19

## 2024-11-15 RX ADMIN — Medication 4 MILLIGRAM(S): at 04:02

## 2024-11-15 RX ADMIN — Medication 4 MILLIGRAM(S): at 07:03

## 2024-11-15 RX ADMIN — Medication 2 PUFF(S): at 10:13

## 2024-11-15 RX ADMIN — POLYETHYLENE GLYCOL 3350 17 GRAM(S): 17 POWDER, FOR SOLUTION ORAL at 06:13

## 2024-11-15 RX ADMIN — Medication 4 MILLIGRAM(S): at 03:02

## 2024-11-15 RX ADMIN — Medication 975 MILLIGRAM(S): at 06:12

## 2024-11-15 RX ADMIN — Medication 975 MILLIGRAM(S): at 07:03

## 2024-11-15 RX ADMIN — Medication 4 MILLIGRAM(S): at 00:59

## 2024-11-15 RX ADMIN — TIOTROPIUM BROMIDE INHALATION SPRAY 2 PUFF(S): 1.56 SPRAY, METERED RESPIRATORY (INHALATION) at 12:30

## 2024-11-15 RX ADMIN — Medication 4 MILLIGRAM(S): at 06:13

## 2024-11-15 RX ADMIN — Medication 4 MILLIGRAM(S): at 13:28

## 2024-11-15 RX ADMIN — Medication 975 MILLIGRAM(S): at 14:37

## 2024-11-15 RX ADMIN — Medication 30 MILLILITER(S): at 12:26

## 2024-11-15 RX ADMIN — ONDANSETRON HYDROCHLORIDE 4 MILLIGRAM(S): 2 INJECTION, SOLUTION INTRAMUSCULAR; INTRAVENOUS at 10:13

## 2024-11-15 RX ADMIN — Medication 4 MILLIGRAM(S): at 12:29

## 2024-11-15 RX ADMIN — MONTELUKAST SODIUM 10 MILLIGRAM(S): 10 TABLET, FILM COATED ORAL at 12:24

## 2024-11-15 RX ADMIN — FLUTICASONE PROPIONATE AND SALMETEROL XINAFOATE 1 DOSE(S): 230; 21 AEROSOL, METERED RESPIRATORY (INHALATION) at 06:13

## 2024-11-15 RX ADMIN — PANTOPRAZOLE SODIUM 40 MILLIGRAM(S): 40 TABLET, DELAYED RELEASE ORAL at 06:13

## 2024-11-15 NOTE — DISCHARGE NOTE NURSING/CASE MANAGEMENT/SOCIAL WORK - NSDCFUADDAPPT_GEN_ALL_CORE_FT

## 2024-11-15 NOTE — PROGRESS NOTE ADULT - SUBJECTIVE AND OBJECTIVE BOX
64yFemale s/p XLIF/PSF L3-4 POD#1. Pt seen and examined in NAD. Pain controlled. Pt denies any new complaints. Pt denies CP/SOB/N/V/D/numbness/tingling/bowel or bladder dysfunction. Preop RLE pain resolved but now with new onset left thigh pain.     PE:   Neuro: AAOX3  Abd: Soft, NT/ND.   Spine: Dressing c/d/i    B/L UE: Skin intact. +ROM shoulder/elbow/wrist/fingers. +ok/thumbsup/fingercross signs.  strength: 5/5.  RP2+ NVI.   RLE: Skin intact. Decreased sensation left lateral thigh, +ROM hip/knee/ankle/toes. Ankle Dorsi/plantarflexion: 5/5. Calf: soft, compressible and nontender. DP/PT 2+   LLE: Skin intact. +ROM hip/knee/ankle/toes. Ankle Dorsi/plantarflexion: 5/5. Calf: soft, compressible and nontender. DP/PT 2+ NVI.                             10.8   12.97 )-----------( 306      ( 14 Nov 2024 06:23 )             33.5       11-14    139  |  105  |  13  ----------------------------<  142[H]  3.8   |  27  |  0.78    Ca    8.8      14 Nov 2024 06:23          A/P: 64yFemale s/p XLIF/PSF L3-4 POD#1.  LLE symptoms expected for procedure, pt counseled. Dr. Alvarez aware  Pain control discussed. Dilaudid dosing interval shortened from Q4h to Q3h.   PT: WBAT - spinal precautions   DVT ppx: SCDs   Wound care, Isometric exercises, incentive spirometry   Pt requires a rolling walker for MRADLs at home   Discharge: planning for home tomorrow  All the above discussed and understood by pt   
64yFemale s/p XLIF L3-4  POD #0  Pt seen and examined in NAD.   Pain controlled.  Pt denies any new complaints.   Pt denies CP/SOB/N/V/D/numbness/tingling/bowel or bladder dysfunction.     Vital Signs Last 24 Hrs  T(C): 36.4 (13 Nov 2024 13:29), Max: 36.8 (13 Nov 2024 06:37)  T(F): 97.6 (13 Nov 2024 13:29), Max: 98.2 (13 Nov 2024 06:37)  HR: 79 (13 Nov 2024 13:29) (68 - 85)  BP: 104/66 (13 Nov 2024 13:29) (101/69 - 119/76)  BP(mean): --  RR: 12 (13 Nov 2024 13:29) (12 - 19)  SpO2: 93% (13 Nov 2024 13:29) (93% - 100%)    Parameters below as of 13 Nov 2024 13:29  Patient On (Oxygen Delivery Method): nasal cannula  O2 Flow (L/min): 3      PE:   General: A&Ox3, NAD  Spine: Dressing c/d/i   B/L UE: Skin intact. +ROM shoulder/elbow/wrist/fingers. +ok/thumbsup/fingercross signs.  strength: 5/5.  RP2+ NVI.   B/L LE: Skin intact. +ROM hip/knee/ankle/toes. Ankle Dorsi/plantarflexion: 5/5. Calf: soft, compressible and nontender. DP/PT 2+ NVI.                     A/P: 64yFemale s/p XLIF L3-4  POD #0  Wound care  Pain controlled  PT: WBAT: Spinal precautions  Isometric exercises  DVT ppx: SCDs  Incentive spirometry counseled   Discharge: planning   All the above discussed and understood by pt   
64yFemale s/p XLIF/PSF L3-4 POD#2.. Pt seen and examined in NAD. Pain controlled.  Pt denies CP/SOB/N/V/D/numbness/tingling/bowel or bladder dysfunction. +flatus. Still with Left Thigh pain/sensory changes.     PE:   Neuro: AAOX3  Abd: Soft, NT/ND. Left flank: incision sutures C/D/I.   Spine: Prineo dressings C/D/I. No collections or fluctuance.   B/L UE: Skin intact. +ROM shoulder/elbow/wrist/fingers. +ok/thumbsup/fingercross signs.  strength: 5/5.  RP2+ NVI.   RLE:  Skin intact.  +ROM hip/knee/ankle/toes. Ankle Dorsi/plantarflexion: 5/5. Calf: soft, compressible and nontender. DP/PT 2+ NVI  LLE: Skin intact. Decreased sensation left anterior/lateral thigh. +ROM hip/knee/ankle/toes. Ankle Dorsi/plantarflexion: 5/5. Calf: soft, compressible and nontender. DP/PT 2+ NVI.                             10.4   9.21  )-----------( 269      ( 15 Nov 2024 06:00 )             32.4       11-15    140  |  107  |  16  ----------------------------<  101[H]  4.7   |  28  |  0.85    Ca    8.8      15 Nov 2024 06:00          A/P: 64yFemale s/p XLIF/PSF L3-4 POD#2.  Dressing changed, new dry clean dressings applied   Pain controlled with dilaudid  PT: WBAT - spinal precautions   DVT ppx: SCDs   Wound care, Isometric exercises, incentive spirometry   Pt requires a rolling walker for MRADLs at home   Discharge: planning for home today with home care  All the above discussed and understood by pt  D/W DR Alvarez

## 2024-11-15 NOTE — DISCHARGE NOTE NURSING/CASE MANAGEMENT/SOCIAL WORK - PATIENT PORTAL LINK FT
You can access the FollowMyHealth Patient Portal offered by [facility] by registering at the following website: [email or web address]. By joining [employer]’s FollowMyHealth portal, you will also be able to view your health information using other applications (apps) compatible with our system.

## 2024-11-15 NOTE — DISCHARGE NOTE NURSING/CASE MANAGEMENT/SOCIAL WORK - FINANCIAL ASSISTANCE
[facility] provides services at a reduced cost to those who are determined to be eligible through [facility]’s financial assistance program. Information regarding [facility]’s financial assistance program can be found by going to [email or web address] or by calling [phone].

## 2024-11-18 ENCOUNTER — TRANSCRIPTION ENCOUNTER (OUTPATIENT)
Age: 64
End: 2024-11-18

## 2024-11-20 ENCOUNTER — NON-APPOINTMENT (OUTPATIENT)
Age: 64
End: 2024-11-20

## 2024-11-26 ENCOUNTER — APPOINTMENT (OUTPATIENT)
Dept: ORTHOPEDIC SURGERY | Facility: CLINIC | Age: 64
End: 2024-11-26
Payer: MEDICARE

## 2024-11-26 VITALS — BODY MASS INDEX: 27.79 KG/M2 | HEIGHT: 62 IN | WEIGHT: 151 LBS

## 2024-11-26 DIAGNOSIS — Z98.1 ARTHRODESIS STATUS: ICD-10-CM

## 2024-11-26 PROBLEM — E78.5 HYPERLIPIDEMIA, UNSPECIFIED: Chronic | Status: ACTIVE | Noted: 2024-10-25

## 2024-11-26 PROBLEM — K21.9 GASTRO-ESOPHAGEAL REFLUX DISEASE WITHOUT ESOPHAGITIS: Chronic | Status: ACTIVE | Noted: 2024-10-25

## 2024-11-26 PROCEDURE — 99024 POSTOP FOLLOW-UP VISIT: CPT

## 2024-11-26 PROCEDURE — 72100 X-RAY EXAM L-S SPINE 2/3 VWS: CPT

## 2024-11-26 RX ORDER — HYDROMORPHONE HYDROCHLORIDE 4 MG/1
4 TABLET ORAL
Qty: 40 | Refills: 0 | Status: ACTIVE | COMMUNITY
Start: 2024-11-26 | End: 1900-01-01

## 2024-11-27 ENCOUNTER — TRANSCRIPTION ENCOUNTER (OUTPATIENT)
Age: 64
End: 2024-11-27

## 2024-11-27 ENCOUNTER — NON-APPOINTMENT (OUTPATIENT)
Age: 64
End: 2024-11-27

## 2024-12-02 ENCOUNTER — TRANSCRIPTION ENCOUNTER (OUTPATIENT)
Age: 64
End: 2024-12-02

## 2024-12-04 ENCOUNTER — TRANSCRIPTION ENCOUNTER (OUTPATIENT)
Age: 64
End: 2024-12-04

## 2024-12-04 ENCOUNTER — APPOINTMENT (OUTPATIENT)
Dept: PULMONOLOGY | Facility: CLINIC | Age: 64
End: 2024-12-04

## 2024-12-05 ENCOUNTER — TRANSCRIPTION ENCOUNTER (OUTPATIENT)
Age: 64
End: 2024-12-05

## 2024-12-05 RX ORDER — GABAPENTIN 100 MG/1
100 CAPSULE ORAL
Qty: 30 | Refills: 0 | Status: ACTIVE | COMMUNITY
Start: 2024-12-05 | End: 1900-01-01

## 2024-12-06 ENCOUNTER — TRANSCRIPTION ENCOUNTER (OUTPATIENT)
Age: 64
End: 2024-12-06

## 2024-12-09 ENCOUNTER — RX RENEWAL (OUTPATIENT)
Age: 64
End: 2024-12-09

## 2024-12-09 ENCOUNTER — TRANSCRIPTION ENCOUNTER (OUTPATIENT)
Age: 64
End: 2024-12-09

## 2024-12-19 ENCOUNTER — APPOINTMENT (OUTPATIENT)
Dept: ORTHOPEDIC SURGERY | Facility: CLINIC | Age: 64
End: 2024-12-19
Payer: MEDICARE

## 2024-12-19 DIAGNOSIS — Z98.1: ICD-10-CM

## 2024-12-19 DIAGNOSIS — M51.369: ICD-10-CM

## 2024-12-19 DIAGNOSIS — Z98.1 ARTHRODESIS STATUS: ICD-10-CM

## 2024-12-19 PROCEDURE — 99024 POSTOP FOLLOW-UP VISIT: CPT

## 2024-12-19 PROCEDURE — 72100 X-RAY EXAM L-S SPINE 2/3 VWS: CPT

## 2024-12-19 RX ORDER — AMOXICILLIN 500 MG/1
500 TABLET, FILM COATED ORAL
Qty: 4 | Refills: 3 | Status: ACTIVE | COMMUNITY
Start: 2024-12-19 | End: 1900-01-01

## 2024-12-26 ENCOUNTER — NON-APPOINTMENT (OUTPATIENT)
Age: 64
End: 2024-12-26

## 2024-12-26 NOTE — H&P PST ADULT - CARDIOVASCULAR
detailed exam Patient is a 59-year-old male who presents to the emergency room for hyponatremia.  Past medical history of paroxysmal SVT hypotension hypothyroidism COPD chronic low back pain Sjogren's disease vertigo GERD anxiety migraines neuropathy osteoporosis glossopharyngeal neuralgia history of femoral DVT seizure disorder skin cancer osteochondritis IBS Raynaud's history of hyponatremia, lupus.  Patient was undergoing preop testing for bone biopsy for possible metastatic lesions to the pelvis.  Reports that since detoxing from her medications at [facility] she has had restless tremors most notably at night and she reports she has not been sleeping for the last several nights.  She does endorse 1 day she took an extra dose of her sodium pills but has not since.  Denies any headache visual changes nausea vomiting chest pain shortness of breath or abdominal pain. Patient presenting to the emergency room with electrolyte abnormality.  Will repeat screening labs supplement as needed consult with nephrology and monitor.  Patient will likely require admission for further workup and evaluation Patient is a 59-year-old male who presents to the emergency room for hyponatremia.  Past medical history of paroxysmal SVT hypotension hypothyroidism COPD chronic low back pain Sjogren's disease vertigo GERD anxiety migraines neuropathy osteoporosis glossopharyngeal neuralgia history of femoral DVT seizure disorder skin cancer osteochondritis IBS Raynaud's history of hyponatremia, lupus.  Patient was undergoing preop testing for bone biopsy for possible metastatic lesions to the pelvis.  Reports that since detoxing from her medications at [facility] she has had restless tremors most notably at night and she reports she has not been sleeping for the last several nights.  She does endorse 1 day she took an extra dose of her sodium pills but has not since.  Denies any headache visual changes nausea vomiting chest pain shortness of breath or abdominal pain. Patient presenting to the emergency room with electrolyte abnormality.  Will repeat screening labs supplement as needed consult with nephrology and monitor.  Patient will likely require admission for further workup and evaluation. Independent review of EKG reveals a NSR with sinus arrythmia at 74 bpm details… Patient is a 59-year-old male who presents to the emergency room for hyponatremia.  Past medical history of paroxysmal SVT hypotension hypothyroidism COPD chronic low back pain Sjogren's disease vertigo GERD anxiety migraines neuropathy osteoporosis glossopharyngeal neuralgia history of femoral DVT seizure disorder skin cancer osteochondritis IBS Raynaud's history of hyponatremia, lupus.  Patient was undergoing preop testing for bone biopsy for possible metastatic lesions to the pelvis.  Reports that since detoxing from her medications at [facility] she has had restless tremors most notably at night and she reports she has not been sleeping for the last several nights.  She does endorse 1 day she took an extra dose of her sodium pills but has not since.  Denies any headache visual changes nausea vomiting chest pain shortness of breath or abdominal pain. Patient presenting to the emergency room with electrolyte abnormality.  Will repeat screening labs supplement as needed consult with nephrology and monitor.  Patient will likely require admission for further workup and evaluation. Independent review of EKG reveals a NSR with sinus arrythmia at 74 bpm. Results of labs reviewed patient within normal white count no evidence of anemia CMP noted there has been improvement in patient's sodium but she is still hyponatremic.  Patient is very acutely agitated refusing imaging has not urinated yet initially requesting to leave.  Had in-depth discussion regarding leaving AGAINST MEDICAL ADVICE as I do not feel comfortable having patient discharged at this time.  Ultimately after speaking with family patient will stay.

## 2024-12-31 ENCOUNTER — TRANSCRIPTION ENCOUNTER (OUTPATIENT)
Age: 64
End: 2024-12-31

## 2025-01-03 ENCOUNTER — APPOINTMENT (OUTPATIENT)
Dept: ORTHOPEDIC SURGERY | Facility: CLINIC | Age: 65
End: 2025-01-03
Payer: MEDICARE

## 2025-01-03 VITALS — BODY MASS INDEX: 27.6 KG/M2 | WEIGHT: 150 LBS | HEIGHT: 62 IN

## 2025-01-03 DIAGNOSIS — M17.12 UNILATERAL PRIMARY OSTEOARTHRITIS, LEFT KNEE: ICD-10-CM

## 2025-01-03 DIAGNOSIS — Z96.652 PRESENCE OF LEFT ARTIFICIAL KNEE JOINT: ICD-10-CM

## 2025-01-03 DIAGNOSIS — T84.098A OTHER MECHANICAL COMPLICATION OF OTHER INTERNAL JOINT PROSTHESIS, INITIAL ENCOUNTER: ICD-10-CM

## 2025-01-03 DIAGNOSIS — Z96.659 OTHER MECHANICAL COMPLICATION OF OTHER INTERNAL JOINT PROSTHESIS, INITIAL ENCOUNTER: ICD-10-CM

## 2025-01-03 PROCEDURE — 99213 OFFICE O/P EST LOW 20 MIN: CPT

## 2025-01-03 PROCEDURE — G2211 COMPLEX E/M VISIT ADD ON: CPT

## 2025-01-03 PROCEDURE — 73562 X-RAY EXAM OF KNEE 3: CPT | Mod: LT

## 2025-01-14 ENCOUNTER — APPOINTMENT (OUTPATIENT)
Dept: ORTHOPEDIC SURGERY | Facility: CLINIC | Age: 65
End: 2025-01-14

## 2025-01-14 VITALS — HEIGHT: 62 IN | BODY MASS INDEX: 27.6 KG/M2 | WEIGHT: 150 LBS

## 2025-01-14 DIAGNOSIS — Z98.1 ARTHRODESIS STATUS: ICD-10-CM

## 2025-01-14 PROCEDURE — 99024 POSTOP FOLLOW-UP VISIT: CPT

## 2025-01-27 ENCOUNTER — NON-APPOINTMENT (OUTPATIENT)
Age: 65
End: 2025-01-27

## 2025-02-11 ENCOUNTER — APPOINTMENT (OUTPATIENT)
Dept: ORTHOPEDIC SURGERY | Facility: CLINIC | Age: 65
End: 2025-02-11
Payer: MEDICARE

## 2025-02-11 VITALS — HEIGHT: 62 IN | WEIGHT: 150 LBS | BODY MASS INDEX: 27.6 KG/M2

## 2025-02-11 DIAGNOSIS — Z98.1 ARTHRODESIS STATUS: ICD-10-CM

## 2025-02-11 PROCEDURE — 72100 X-RAY EXAM L-S SPINE 2/3 VWS: CPT

## 2025-02-11 PROCEDURE — 99024 POSTOP FOLLOW-UP VISIT: CPT

## 2025-02-12 ENCOUNTER — TRANSCRIPTION ENCOUNTER (OUTPATIENT)
Age: 65
End: 2025-02-12

## 2025-03-10 ENCOUNTER — TRANSCRIPTION ENCOUNTER (OUTPATIENT)
Age: 65
End: 2025-03-10

## 2025-03-10 RX ORDER — ONDANSETRON 4 MG/1
4 TABLET ORAL
Qty: 30 | Refills: 0 | Status: ACTIVE | COMMUNITY
Start: 2025-03-10 | End: 1900-01-01

## 2025-03-10 RX ORDER — HYDROCODONE BITARTRATE AND ACETAMINOPHEN 10; 325 MG/1; MG/1
10-325 TABLET ORAL
Qty: 30 | Refills: 0 | Status: ACTIVE | COMMUNITY
Start: 2025-03-10 | End: 1900-01-01

## 2025-03-12 ENCOUNTER — TRANSCRIPTION ENCOUNTER (OUTPATIENT)
Age: 65
End: 2025-03-12

## 2025-03-18 ENCOUNTER — RX RENEWAL (OUTPATIENT)
Age: 65
End: 2025-03-18

## 2025-04-02 ENCOUNTER — NON-APPOINTMENT (OUTPATIENT)
Age: 65
End: 2025-04-02

## 2025-04-07 ENCOUNTER — TRANSCRIPTION ENCOUNTER (OUTPATIENT)
Age: 65
End: 2025-04-07

## 2025-04-09 ENCOUNTER — TRANSCRIPTION ENCOUNTER (OUTPATIENT)
Age: 65
End: 2025-04-09

## 2025-04-10 ENCOUNTER — TRANSCRIPTION ENCOUNTER (OUTPATIENT)
Age: 65
End: 2025-04-10

## 2025-04-12 ENCOUNTER — NON-APPOINTMENT (OUTPATIENT)
Age: 65
End: 2025-04-12

## 2025-04-18 ENCOUNTER — RX RENEWAL (OUTPATIENT)
Age: 65
End: 2025-04-18

## 2025-05-12 NOTE — HISTORY OF PRESENT ILLNESS
1/21/25, 245 lbs, started ozempic 0.25 mg weekly.     3/17/25, 243 lbs, patient had to skip two weeks of the medication because of a procedure.   No nausea vomiting or constipation.   Increase ozempic 0.5 mg weekly. Keep two month in office visit.     Today, 5/12/25, 236 lbs.   Patient continues to work out daily.   Will increase ozepmic to 1 mg weekly.     Orders:  •  Semaglutide, 1 MG/DOSE, (Ozempic, 1 MG/DOSE,) 4 MG/3ML Solution Pen-injector; Inject 1 mg into the skin every 7 days. Indications: Type 2 Diabetes     [8] : 8 [0] : 0 [Localized] : localized [Sharp] : sharp [Retired] : Work status: retired [de-identified] : 11/17/22: Pt is a 62 year old female who presents for right foot pain. Pt states in 2017, she had a5th ht repair by right foot by Dr. Stacy. Pt states on 11/15/22, she stepped off the last step and when she stepped on the floor, herfourth toe bent. Pt denies swelling to toe. Pt denies numbness/tingling. Pt describes pain as sharp. Pt states pain is most prevalent with putting too much pressure on toe. Pt notes pain is present while walking, and limps due to pain. Pt notes pain is unbearable while walking down stairs. Pt states within the last few years she noticed the shape of her toe change, and chronic pain. Pt did not see anyone for injury. Pt did not take medications to alleviate pain. Pt ices. difficulty bearing weight. \par \par 12/9/2022: f/u R foot  wb in p-op shoe po tape  \par 3/30/23  f/u R foot  pain persist to 4>3 toes.  Tried pads, taping\par 4/14/23  f/u R foot  Had good response from last injection but now wearing off\par \par 05/04/2023: F/U R foot. Here to discuss MRI results Using 3/4 length orthotics [] : Post Surgical Visit: no [FreeTextEntry1] : R foot [FreeTextEntry3] : 11/15/22 [FreeTextEntry5] : 63 y/o LHD F eval R foot s/p toe sprain on above DOI. pt states there has been a slight improvement in condition sicne last visit.  [de-identified] : [facility] 3/30/23 [de-identified] : [facility]

## 2025-05-13 ENCOUNTER — APPOINTMENT (OUTPATIENT)
Dept: ORTHOPEDIC SURGERY | Facility: CLINIC | Age: 65
End: 2025-05-13
Payer: MEDICARE

## 2025-05-13 VITALS — WEIGHT: 150 LBS | BODY MASS INDEX: 27.6 KG/M2 | HEIGHT: 62 IN

## 2025-05-13 DIAGNOSIS — M51.369: ICD-10-CM

## 2025-05-13 DIAGNOSIS — M54.12 RADICULOPATHY, CERVICAL REGION: ICD-10-CM

## 2025-05-13 DIAGNOSIS — Z98.1: ICD-10-CM

## 2025-05-13 DIAGNOSIS — Z98.890 OTHER SPECIFIED POSTPROCEDURAL STATES: ICD-10-CM

## 2025-05-13 DIAGNOSIS — Z98.1 ARTHRODESIS STATUS: ICD-10-CM

## 2025-05-13 PROCEDURE — 99214 OFFICE O/P EST MOD 30 MIN: CPT

## 2025-05-13 PROCEDURE — 72100 X-RAY EXAM L-S SPINE 2/3 VWS: CPT

## 2025-05-13 PROCEDURE — 72040 X-RAY EXAM NECK SPINE 2-3 VW: CPT

## 2025-05-21 ENCOUNTER — APPOINTMENT (OUTPATIENT)
Dept: PULMONOLOGY | Facility: CLINIC | Age: 65
End: 2025-05-21

## 2025-05-22 ENCOUNTER — RX RENEWAL (OUTPATIENT)
Age: 65
End: 2025-05-22

## 2025-05-28 NOTE — H&P PST ADULT - NSICDXFAMILYHX_GEN_ALL_CORE_FT
No FAMILY HISTORY:  Father  Still living? Unknown  FH: CAD (coronary artery disease), Age at diagnosis: Age Unknown    Mother  Still living? No  FH: CAD (coronary artery disease), Age at diagnosis: Age Unknown

## 2025-06-02 ENCOUNTER — NON-APPOINTMENT (OUTPATIENT)
Age: 65
End: 2025-06-02

## 2025-06-03 ENCOUNTER — APPOINTMENT (OUTPATIENT)
Dept: PULMONOLOGY | Facility: CLINIC | Age: 65
End: 2025-06-03
Payer: MEDICARE

## 2025-06-03 VITALS
HEART RATE: 94 BPM | DIASTOLIC BLOOD PRESSURE: 66 MMHG | WEIGHT: 145 LBS | HEIGHT: 62 IN | OXYGEN SATURATION: 96 % | TEMPERATURE: 97.8 F | RESPIRATION RATE: 16 BRPM | BODY MASS INDEX: 26.68 KG/M2 | SYSTOLIC BLOOD PRESSURE: 114 MMHG

## 2025-06-03 DIAGNOSIS — R09.82 POSTNASAL DRIP: ICD-10-CM

## 2025-06-03 DIAGNOSIS — K21.9 GASTRO-ESOPHAGEAL REFLUX DISEASE W/OUT ESOPHAGITIS: ICD-10-CM

## 2025-06-03 DIAGNOSIS — J45.909 UNSPECIFIED ASTHMA, UNCOMPLICATED: ICD-10-CM

## 2025-06-03 DIAGNOSIS — J30.9 ALLERGIC RHINITIS, UNSPECIFIED: ICD-10-CM

## 2025-06-03 DIAGNOSIS — G47.33 OBSTRUCTIVE SLEEP APNEA (ADULT) (PEDIATRIC): ICD-10-CM

## 2025-06-03 PROCEDURE — 99214 OFFICE O/P EST MOD 30 MIN: CPT | Mod: 25

## 2025-06-25 ENCOUNTER — RX RENEWAL (OUTPATIENT)
Age: 65
End: 2025-06-25

## 2025-06-26 ENCOUNTER — TRANSCRIPTION ENCOUNTER (OUTPATIENT)
Age: 65
End: 2025-06-26

## 2025-06-26 ENCOUNTER — APPOINTMENT (OUTPATIENT)
Dept: ULTRASOUND IMAGING | Facility: CLINIC | Age: 65
End: 2025-06-26
Payer: MEDICARE

## 2025-06-26 ENCOUNTER — OUTPATIENT (OUTPATIENT)
Dept: OUTPATIENT SERVICES | Facility: HOSPITAL | Age: 65
LOS: 1 days | End: 2025-06-26
Payer: MEDICARE

## 2025-06-26 DIAGNOSIS — K82.4 CHOLESTEROLOSIS OF GALLBLADDER: ICD-10-CM

## 2025-06-26 DIAGNOSIS — Z00.00 ENCOUNTER FOR GENERAL ADULT MEDICAL EXAMINATION WITHOUT ABNORMAL FINDINGS: ICD-10-CM

## 2025-06-26 DIAGNOSIS — Z96.659 PRESENCE OF UNSPECIFIED ARTIFICIAL KNEE JOINT: Chronic | ICD-10-CM

## 2025-06-26 DIAGNOSIS — K90.0 CELIAC DISEASE: ICD-10-CM

## 2025-06-26 DIAGNOSIS — R10.11 RIGHT UPPER QUADRANT PAIN: ICD-10-CM

## 2025-06-26 DIAGNOSIS — Z98.890 OTHER SPECIFIED POSTPROCEDURAL STATES: Chronic | ICD-10-CM

## 2025-06-26 DIAGNOSIS — K20.0 EOSINOPHILIC ESOPHAGITIS: ICD-10-CM

## 2025-06-26 DIAGNOSIS — K59.04 CHRONIC IDIOPATHIC CONSTIPATION: ICD-10-CM

## 2025-06-26 DIAGNOSIS — Z98.1 ARTHRODESIS STATUS: Chronic | ICD-10-CM

## 2025-06-26 PROCEDURE — 76700 US EXAM ABDOM COMPLETE: CPT | Mod: 26

## 2025-06-26 PROCEDURE — 76700 US EXAM ABDOM COMPLETE: CPT

## 2025-07-15 ENCOUNTER — TRANSCRIPTION ENCOUNTER (OUTPATIENT)
Age: 65
End: 2025-07-15

## 2025-07-24 ENCOUNTER — RX RENEWAL (OUTPATIENT)
Age: 65
End: 2025-07-24

## 2025-07-28 ENCOUNTER — RX RENEWAL (OUTPATIENT)
Age: 65
End: 2025-07-28

## 2025-08-06 ENCOUNTER — TRANSCRIPTION ENCOUNTER (OUTPATIENT)
Age: 65
End: 2025-08-06

## 2025-09-08 ENCOUNTER — APPOINTMENT (OUTPATIENT)
Dept: MAMMOGRAPHY | Facility: IMAGING CENTER | Age: 65
End: 2025-09-08
Payer: MEDICARE

## 2025-09-08 PROCEDURE — 77067 SCR MAMMO BI INCL CAD: CPT | Mod: 26

## 2025-09-08 PROCEDURE — 77063 BREAST TOMOSYNTHESIS BI: CPT | Mod: 26

## 2025-09-15 ENCOUNTER — APPOINTMENT (OUTPATIENT)
Dept: RADIOLOGY | Facility: IMAGING CENTER | Age: 65
End: 2025-09-15
Payer: MEDICARE

## 2025-09-15 PROCEDURE — 77080 DXA BONE DENSITY AXIAL: CPT | Mod: 26

## (undated) DEVICE — SUT PROLENE 3-0 36" SH

## (undated) DEVICE — PROBE STIM CLIP-ACTIVATOR SNGL

## (undated) DEVICE — DRAPE SHOWER CURTAIN ISOLATION

## (undated) DEVICE — DRSG DERMABOND 0.7ML

## (undated) DEVICE — DRSG MASTISOL

## (undated) DEVICE — SUT STRATAFIX SPIRAL MONOCRYL PLUS 4-0 70CM PS-2 UNDYED

## (undated) DEVICE — BLADE SURGICAL #20 CARBON

## (undated) DEVICE — FRA-TOURNIQUET 402010120012: Type: DURABLE MEDICAL EQUIPMENT

## (undated) DEVICE — ELCTR BOVIE TIP BLADE INSULATED 4" EDGE

## (undated) DEVICE — GOWN IMPERV BREATHABLE XL

## (undated) DEVICE — ELCTR GROUNDING PAD ADULT COVIDIEN

## (undated) DEVICE — KIT NDL PULSE EMG ELECTRODE

## (undated) DEVICE — GLV 7.5 PROTEXIS (WHITE)

## (undated) DEVICE — SUT ETHILON 2-0 18" FS

## (undated) DEVICE — DRSG CURITY GAUZE SPONGE 4 X 4" 12-PLY

## (undated) DEVICE — SAW BLADE LINVATEC OSCILLATING 9.5X25X0.4MM

## (undated) DEVICE — TAPE SILK 3"

## (undated) DEVICE — DRSG TEGADERM 4X4.75"

## (undated) DEVICE — DRAPE MAYO STAND 30"

## (undated) DEVICE — DRSG AQUACEL 3.5 X 10"

## (undated) DEVICE — NDL HYPO SAFE 22G X 1.5" (BLACK)

## (undated) DEVICE — PACK SPINE

## (undated) DEVICE — DRSG STERISTRIPS 0.5 X 4"

## (undated) DEVICE — PACK BASIC

## (undated) DEVICE — GLV 9 PROTEXIS ORTHO (BROWN)

## (undated) DEVICE — DRAPE LIGHT HANDLE COVER (GREEN)

## (undated) DEVICE — NDL SPINAL 18G X 3.5" (PINK)

## (undated) DEVICE — SOL ANTI FOG

## (undated) DEVICE — SOL IRR POUR H2O 1000ML

## (undated) DEVICE — GLV 8 PROTEXIS (WHITE)

## (undated) DEVICE — DRAPE TOWEL BLUE 17" X 24"

## (undated) DEVICE — POSITIONER STRAP ARMBOARD VELCRO TS-30

## (undated) DEVICE — DRAPE STERI-DRAPE INCISE 32X33"

## (undated) DEVICE — GLV 8 PROTEXIS (BLUE)

## (undated) DEVICE — FRA-ESU BOVIE FORCE TRIAD T0E42286E: Type: DURABLE MEDICAL EQUIPMENT

## (undated) DEVICE — DRSG COBAN 2" LF STERILE

## (undated) DEVICE — DISSECTOR STICK ENDOSCOPIC BULLET 5X400MM

## (undated) DEVICE — WARMING BLANKET LOWER ADULT

## (undated) DEVICE — LAP PAD W RING 18 X 18"

## (undated) DEVICE — DRSG AQUACEL 3.5 X 12"

## (undated) DEVICE — BIPOLAR FORCEP HENSLER BAYONET 12" X 1MM (YELLOW)

## (undated) DEVICE — DRSG PREVENA PLUS SYSTEM

## (undated) DEVICE — DRSG WEBRIL 6"

## (undated) DEVICE — GLV 6 PROTEXIS (WHITE)

## (undated) DEVICE — ELCTR BIPOLAR CORD 12FT

## (undated) DEVICE — MIDAS REX LEGEND MATCH HEAD FLUTED LG BORE 3.0MM X 14CM

## (undated) DEVICE — SPONGE PEANUT AUTO COUNT

## (undated) DEVICE — ZIMMER BLADE PATELLA REAMER W PILOT HOLE SZ 32

## (undated) DEVICE — SAW BLADE LINVATEC HALL 13X90X1.27MM

## (undated) DEVICE — DRAPE C ARM 41X140"

## (undated) DEVICE — KIT ARRAY PULSE PATIENT REFERENCE

## (undated) DEVICE — DRAPE 3/4 SHEET W REINFORCEMENT 56X77"

## (undated) DEVICE — POSITIONER JACKSON TABLE HEADREST 7", CHEST, HIP, THIGH PADS, ARM CRADLE

## (undated) DEVICE — DRSG PAD EYE OVAL

## (undated) DEVICE — MEDICATION LABELS W MARKER

## (undated) DEVICE — BIPOLAR FORCEP HENSLER BAYONET 8" X 1MM (YELLOW)

## (undated) DEVICE — DRAIN JACKSON PRATT 3 SPRING RESERVOIR W 10FR PVC DRAIN

## (undated) DEVICE — FRA-ESU BOVIE FORCE TRIAD T7J19745DX: Type: DURABLE MEDICAL EQUIPMENT

## (undated) DEVICE — SUT VICRYL 0 18" CT-1 UNDYED (POP-OFF)

## (undated) DEVICE — KIT MAXCESS SURGICAL ACCESS

## (undated) DEVICE — POSITIONER FOAM LAMINECTOMY ARM CRADLE (PINK)

## (undated) DEVICE — POSITIONER CUSHION INSERT PRONE VIEW LG

## (undated) DEVICE — SYR LUER LOK 10CC

## (undated) DEVICE — SUCTION YANKAUER NO CONTROL VENT

## (undated) DEVICE — Device

## (undated) DEVICE — VISITEC 4X4

## (undated) DEVICE — GLV 6.5 PROTEXIS (WHITE)

## (undated) DEVICE — SUT MONOCRYL 3-0 27" PS-2 UNDYED

## (undated) DEVICE — PREP CHLORAPREP HI-LITE ORANGE 26ML

## (undated) DEVICE — GOWN XXL EXTRA LONG

## (undated) DEVICE — TOURNIQUET ESMARK 6"

## (undated) DEVICE — DRSG STERISTRIPS 0.25 X 3"

## (undated) DEVICE — TOURNIQUET CUFF 18" DUAL PORT SINGLE BLADDER LUER LOCK (BLACK)

## (undated) DEVICE — DRSG AQUACEL 3.5 X 14"

## (undated) DEVICE — SUT MONOCRYL 5-0 18" P-3 UNDYED

## (undated) DEVICE — KIT DILATOR KWIRE NVM5 XLIF

## (undated) DEVICE — BLADE OSTEOTOME FLEX 10MMX1.5"

## (undated) DEVICE — DRAPE STERI-DRAPE INCISE 19X17"

## (undated) DEVICE — PACK HAND TRAY

## (undated) DEVICE — DRSG TELFA 3 X 8

## (undated) DEVICE — HOOD FLYTE STRYKER HELMET SHIELD

## (undated) DEVICE — PACK TOTAL JOINT

## (undated) DEVICE — DRAPE INSTRUMENT POUCH 6.75" X 11"

## (undated) DEVICE — SUT NYLON 3-0 18" PS-2

## (undated) DEVICE — FOLEY TRAY 14FR 5CC LF UMETER CLOSED

## (undated) DEVICE — FRAZIER SUCTION TIP 10FR

## (undated) DEVICE — VENODYNE/SCD SLEEVE CALF MEDIUM

## (undated) DEVICE — GLV 7 PROTEXIS (WHITE)

## (undated) DEVICE — SUT VICRYL PLUS 0 18" CT-1 UNDYED (POP-OFF)

## (undated) DEVICE — WARMING BLANKET UPPER ADULT

## (undated) DEVICE — ZIMMER PULSAVAC PLUS FAN KIT

## (undated) DEVICE — SUT VICRYL 2-0 27" CT-2 UNDYED

## (undated) DEVICE — FRAZIER SUCTION TIP 8FR

## (undated) DEVICE — DRAPE EXTREMITY 87" X 128.5"

## (undated) DEVICE — PREP DURAPREP 6CC

## (undated) DEVICE — SYR LUER LOK 50CC

## (undated) DEVICE — SUT VICRYL PLUS 2-0 18" CP-2 UNDYED (POP-OFF)

## (undated) DEVICE — SUT VICRYL PLUS 1 18" CT-1 UNDYED (POP-OFF)

## (undated) DEVICE — BLADE SURGICAL #15 CARBON

## (undated) DEVICE — NDL HYPO SAFE 18G X 1.5" (PINK)

## (undated) DEVICE — KNIFE LIGAMENT RETROGRADE

## (undated) DEVICE — GLV 9 PROTEXIS (WHITE)

## (undated) DEVICE — TUBING SUCTION NONCONDUCTIVE 6MM X 12FT

## (undated) DEVICE — SOL IRR POUR NS 0.9% 500ML

## (undated) DEVICE — SOL INJ NS 0.9% 500ML 1-PORT

## (undated) DEVICE — FRA-ESU BOVIE FORCE TRIAD T6D04558DX: Type: DURABLE MEDICAL EQUIPMENT

## (undated) DEVICE — GLV 7 PROTEXIS (BLUE)

## (undated) DEVICE — DRSG XEROFORM 5 X 9"

## (undated) DEVICE — DRAPE MAYO STAND 23"

## (undated) DEVICE — TUBING BIPOLAR IRRIGATOR AND CORD SET

## (undated) DEVICE — SOL IRR POUR NS 0.9% 1000ML

## (undated) DEVICE — STAPLER SKIN MULTI DIRECTION W35

## (undated) DEVICE — PACK CERVICAL FUSION

## (undated) DEVICE — BLADE OSTEOTOME FLEX 6MMX1.5"

## (undated) DEVICE — ZIMMER MIXING BOWL WITH SPATULA

## (undated) DEVICE — NUVASIVE NVJJB / M5 I-PAS III (BEVELED TIP)

## (undated) DEVICE — PROBE STIM MONOPOLAR

## (undated) DEVICE — ELCTR BOVIE TIP BLADE INSULATED 2.75" EDGE WITH SAFETY

## (undated) DEVICE — DRAPE SURGICAL #1010

## (undated) DEVICE — POSITIONER FOAM HEAD DONUT 9" (PINK)

## (undated) DEVICE — SAW BLADE STRYKER SAGITTAL DUAL CUT 75X18X1.27MM

## (undated) DEVICE — DRSG EYE PAD OVAL STERILE

## (undated) DEVICE — GLV 6.5 PROTEXIS (BLUE)

## (undated) DEVICE — CRYO/CUFF GRAVITY COOLER KNEE LARGE

## (undated) DEVICE — MARKING PEN W RULER

## (undated) DEVICE — POSITIONER PATIENT SAFETY STRAP 3X60"

## (undated) DEVICE — PREP CHLORAPREP HI-LITE ORANGE 10.5ML

## (undated) DEVICE — ZIMMER BLADE PATELLA REAMER 35MM

## (undated) DEVICE — DRSG DERMABOND PRINEO 22CM

## (undated) DEVICE — SOL IRR BAG NS 0.9% 3000ML

## (undated) DEVICE — ELCTR STRYKER NEPTUNE SMOKE EVACUATION PENCIL (GREEN)